# Patient Record
Sex: FEMALE | Race: OTHER | Employment: FULL TIME | URBAN - METROPOLITAN AREA
[De-identification: names, ages, dates, MRNs, and addresses within clinical notes are randomized per-mention and may not be internally consistent; named-entity substitution may affect disease eponyms.]

---

## 2017-01-04 ENCOUNTER — OFFICE VISIT (OUTPATIENT)
Dept: PHYSICAL THERAPY | Age: 52
End: 2017-01-04
Attending: INTERNAL MEDICINE

## 2017-01-04 PROCEDURE — 97110 THERAPEUTIC EXERCISES: CPT

## 2017-01-04 PROCEDURE — 97140 MANUAL THERAPY 1/> REGIONS: CPT

## 2017-01-04 NOTE — PROGRESS NOTES
Goals     • Therapy Goals       Long Term Goals (Time Frame 12 visits)   1. Pt to report reduction of low back pain with prolonged sitting >30 minutes to no more than 1-2/10 pain for improved pain management with daily skill   2.  Pt to demonstrate impro symptoms of the R anterior thigh with any lumbar testing including neural SLR and Slump tests (only local hamstring tightness).  R>L SIJ special tests demonstrate involvement including ASLR, Compression, Distraction and posterior thigh thrust.  Palpation o bands 2x10     Manual x units x 1  STM, trigger point release  For LS paraspinals R>L x2 units  1. STM  2 trigger point   3. CF  4. Anaid's  (1-5 L>R) X 1 unit   1. STM  2. Trigger points  3. Thieles Lside:1-3,5  R :5 x2 units  1. STM  2.  Trigger points  On Palpation Left Right Comments   Superficial Transverse perineal minimal restriction, trigger point, pain and tenderness moderate restriction, severe restriction, trigger point, pain and tenderness    Bulbocavernosus moderate restriction, trigger point, ole but relates that she may be lifting more than is allowed. She reports still having leaking when she coughs hard , sneezes and laugh with friends. She has have tried to do STM on PFM herself with digital mobilization but was not sure she was doing it right. 2.5/10 scale in VAS. She states that she has been doing HEP and she was having pain. She reports that she has been doing them as instructed and when she saw MD she was also instructed not to overdo HEP.  SHe relates that her pelvic pain is better and that s exercises at this time    Therapist: Moni Yan, [facility] 1:57 PM     12/27/16    Pt states that she has not been leaking at all even with laughing, no episodes of leaking this week. Pt has purchased the thera wand at and this time, no questions.  Pt s     External Rotation  4/5  4-/5      Internal Rotation  4/5  4-/5      Knee:          Flexion  5/5  5/5      Extension  5/5  4+/5      Ankle:          Dorsiflexion  5/5  5/5      Abdominals:  3/5                     Therapist: Koki Gillespie PT, CRIS 1

## 2017-01-05 ENCOUNTER — OFFICE VISIT (OUTPATIENT)
Dept: PHYSICAL THERAPY | Age: 52
End: 2017-01-05
Attending: PHYSICAL MEDICINE & REHABILITATION
Payer: OTHER MISCELLANEOUS

## 2017-01-05 PROCEDURE — 97110 THERAPEUTIC EXERCISES: CPT

## 2017-01-05 PROCEDURE — 97140 MANUAL THERAPY 1/> REGIONS: CPT

## 2017-01-05 NOTE — PROGRESS NOTES
Goals     • Therapy Goals       Long Term Goals (Time Frame 12 visits)   1. Pt to report reduction of low back pain with prolonged sitting >30 minutes to no more than 1-2/10 pain for improved pain management with daily skill   2.  Pt to demonstrate impro symptoms of the R anterior thigh with any lumbar testing including neural SLR and Slump tests (only local hamstring tightness).  R>L SIJ special tests demonstrate involvement including ASLR, Compression, Distraction and posterior thigh thrust.  Palpation o mins  14. Standing hip ex x15 RTB: flex/ext/abd     Manual x units x 1  STM, trigger point release  For LS paraspinals R>L x1 unit  1. STM  2 trigger point   LS paraspinals R>L X 1 unit   1. STM  2. Trigger points  3. Thieles Lside:1-3,5  R :5 x2 units  1.  Bria Grayding perineal    Internal Palpation Left Right Comments   Superficial Transverse perineal minimal restriction, trigger point, pain and tenderness moderate restriction, severe restriction, trigger point, pain and tenderness    Bulbocavernosus moderate restrictio when she lifts at work but relates that she may be lifting more than is allowed. She reports still having leaking when she coughs hard , sneezes and laugh with friends.  She has have tried to do STM on PFM herself with digital mobilization but was not sure with low back pain , 2.5/10 scale in VAS. She states that she has been doing HEP and she was having pain. She reports that she has been doing them as instructed and when she saw MD she was also instructed not to overdo HEP.  SHe relates that her pelvic pain week . No other exercises at this time    Therapist: Guera Edmonds, PT,[facility] 1:57 PM     12/27/16    Pt states that she has not been leaking at all even with laughing, no episodes of leaking this week.  Pt has purchased the thera wand at and this time, no Adduction  4+/5  4/5      External Rotation  4/5  4-/5      Internal Rotation  4/5  4-/5      Knee:          Flexion  5/5  5/5      Extension  5/5  4+/5      Ankle:          Dorsiflexion  5/5  5/5      Abdominals:  3/5                     Therapist: Willie Cabrera

## 2017-01-05 NOTE — PROGRESS NOTES
Patient Name: Marci Machuca, : 1965, MRN: [id number]   Date:  2017  Referring Physician:  Dr Sagar Fine    Diagnosis: Lumbar sprain S33. 5XXA,    Progress Summary    Pt has attended 10 out of 12 visits approved  in Physical Therapy. no more than 1x/month for improved pelvic floor strength   4. Pt to improve strength in all muscles tested below 5 by 1/2 MMT grade for improved squatting to  objects off floor.    5. Pt will exhibit 4/5 strength and 7 secs hold x 7 reps in PFM for c certification required: Yes  I certify the need for these services furnished under this plan of treatment and while under my care.     X___________________________________________________ Date____________________    Certification From: 3/2/2680  To:4/5/2017

## 2017-01-10 ENCOUNTER — OFFICE VISIT (OUTPATIENT)
Dept: PHYSICAL THERAPY | Age: 52
End: 2017-01-10
Attending: INTERNAL MEDICINE
Payer: OTHER MISCELLANEOUS

## 2017-01-10 PROCEDURE — 97140 MANUAL THERAPY 1/> REGIONS: CPT

## 2017-01-10 PROCEDURE — 97110 THERAPEUTIC EXERCISES: CPT

## 2017-01-10 NOTE — PROGRESS NOTES
Goals     • Therapy Goals       Long Term Goals (Time Frame 12 visits)   1. Pt to report reduction of low back pain with prolonged sitting >30 minutes to no more than 1-2/10 pain for improved pain management with daily skill   2.  Pt to demonstrate impro symptoms of the R anterior thigh with any lumbar testing including neural SLR and Slump tests (only local hamstring tightness).  R>L SIJ special tests demonstrate involvement including ASLR, Compression, Distraction and posterior thigh thrust.  Palpation o Shutttle R/L 2 bands 2x10  13. NU step level 6 x 7 mins  14. Standing hip ex x15 RTB: flex/ext/abd  15. minisquats  On RB 2x10  16. DKTC  17. seated trunk flexion     Manual x units x 1  STM, trigger point release  For LS paraspinals R>L x1 unit  1. STM  2 WNL  Perineal body: WNL  Pelvic clock: Pain on R levator ani, B bulbocavenosus , B superficial transverse perineal    Internal Palpation Left Right Comments   Superficial Transverse perineal minimal restriction, trigger point, pain and tenderness moderate the same regarding her pain. Pain level is at 3/10 on low back but states that she still has a lot of pain when she lifts at work but relates that she may be lifting more than is allowed.  She reports still having leaking when she coughs hard , sneezes and details. Therapist: Ashlee Syed 3:53 PM    12/19/16    Pt states that she is still having problems with low back pain , 2.5/10 scale in VAS. She states that she has been doing HEP and she was having pain.  She reports that she has been doing to assist with interpretation and pt did better with engaging. Pt was instructed to just do TrA exercises this whole week .  No other exercises at this time    Therapist: Stef Pittman, [facility] 1:57 PM     12/27/16    Pt states that she has not been leak Hip:  Left  Right  Comments    Flexion  4+/5  4/5      Extension  4/5  4-/5      Abduction  4/5  4-/5      Adduction  4+/5  4/5      External Rotation  4/5  4-/5      Internal Rotation  4/5  4-/5      Knee:          Flexion  5/5  5/5      Extension  5/

## 2017-01-12 ENCOUNTER — OFFICE VISIT (OUTPATIENT)
Dept: PHYSICAL THERAPY | Age: 52
End: 2017-01-12
Attending: PHYSICAL MEDICINE & REHABILITATION
Payer: OTHER MISCELLANEOUS

## 2017-01-12 PROCEDURE — 97110 THERAPEUTIC EXERCISES: CPT

## 2017-01-12 PROCEDURE — 97140 MANUAL THERAPY 1/> REGIONS: CPT

## 2017-01-12 NOTE — PROGRESS NOTES
Goals     • Therapy Goals       Long Term Goals (Time Frame 12 visits)   1. Pt to report reduction of low back pain with prolonged sitting >30 minutes to no more than 1-2/10 pain for improved pain management with daily skill   2.  Pt to demonstrate impro symptoms of the R anterior thigh with any lumbar testing including neural SLR and Slump tests (only local hamstring tightness).  R>L SIJ special tests demonstrate involvement including ASLR, Compression, Distraction and posterior thigh thrust.  Palpation o x2  11. Shuttle BLE 5bands 2x10  12. Shutttle R/L 2 bands 2x10  13. NU step level 6 x 7 mins  14. Standing hip ex x15 RTB: flex/ext/abd  15. minisquats  On RB 2x10  16. DKTC  17. seated trunk flexion    18.bosu lunges 2x10  19.  NWB lumbar rot   Manual x un pubis: WNL  Labia majora: WNL  Labia minora:  WNL  Urethral meatus: WNL  Introitus: WNL  Perineal body: WNL  Pelvic clock: Pain on R levator ani, B bulbocavenosus , B superficial transverse perineal    Internal Palpation Left Right Comments   Superficial Tr 3:46 PM    12/12/2016  Pt came to therapy this day and states that she feela about the same regarding her pain.  Pain level is at 3/10 on low back but states that she still has a lot of pain when she lifts at work but relates that she may be lifting more th continue with POC and work on further [address] [place] [address]. See progress notes for details. Therapist: Abbey Lynch 3:53 PM    12/19/16    Pt states that she is still having problems with low back pain , 2.5/10 scale in VAS.  She states that she has b to just exhale without engaging TrA. PT had somebody who speaks Divehi to assist with interpretation and pt did better with engaging. Pt was instructed to just do TrA exercises this whole week .  No other exercises at this time    Therapist: Paris Reza, paraspinal mm R>L  Pt needed verbal cues for PFM level 2 so kept the same level     Hip:  Left  Right  Comments    Flexion  4+/5  4/5      Extension  4/5  4-/5      Abduction  4/5  4-/5      Adduction  4+/5  4/5      External Rotation  4/5  4-/5      Inter exercises and initiated dynamic lunges this day. Reviewed HEP.     Therapist: Diomedes Luis PT, CRIS

## 2017-01-16 ENCOUNTER — OFFICE VISIT (OUTPATIENT)
Dept: PHYSICAL THERAPY | Age: 52
End: 2017-01-16
Attending: PHYSICAL MEDICINE & REHABILITATION
Payer: OTHER MISCELLANEOUS

## 2017-01-16 PROCEDURE — 97110 THERAPEUTIC EXERCISES: CPT

## 2017-01-16 NOTE — PROGRESS NOTES
Goals     • Therapy Goals       Long Term Goals (Time Frame 12 visits)   1. Pt to report reduction of low back pain with prolonged sitting >30 minutes to no more than 1-2/10 pain for improved pain management with daily skill   2.  Pt to demonstrate impro symptoms of the R anterior thigh with any lumbar testing including neural SLR and Slump tests (only local hamstring tightness).  R>L SIJ special tests demonstrate involvement including ASLR, Compression, Distraction and posterior thigh thrust.  Palpation o abd  10. Floor to waist lift6# x2  11. Shuttle BLE 5bands 2x10  12. Shutttle R/L 2 bands 2x10  13. NU step level 6 x 7 mins  14. Standing hip ex x15 RTB: flex/ext/abd  15. minisquats  On RB 2x10  16.  DKTC  17. seated trunk flexion    18.bosu lunges 2x10  1 day:  PELVIC EXAMINATION  Verbal consent given internal examination: YES    External Observation  Mons pubis: WNL  Labia majora: WNL  Labia minora:  WNL  Urethral meatus: WNL  Introitus: WNL  Perineal body: WNL  Pelvic clock: Pain on R levator ani, B bulboc displacement felt with palpation, no visible displacement    Therapist: Kya William, PT, CRIS  12/8/2016 3:46 PM    12/12/2016  Pt came to therapy this day and states that she feela about the same regarding her pain.  Pain level is at 3/10 on low back decreased trigger point feel, further decreased wit treatment. Advanced  HEP ,kept PFM ex the same and . WIll continue with POC and work on further strethening. See progress notes for details.     Therapist: Alma Wilde 3:53 PM    12/19/16    P in supine. Pt had a difficult time and had to be cued in standing but still not able to engage as well , seems to just exhale without engaging TrA. PT had somebody who speaks Botswanan to assist with interpretation and pt did better with engaging.  Pt was ins R.L  Poor techniques with floor to wasit technique with 6# trial  Still with moderate hypertonicty of paraspinal mm R>L  Pt needed verbal cues for PFM level 2 so kept the same level     Hip:  Left  Right  Comments    Flexion  4+/5  4/5      Extension  4/5  time. STM continued as pt continues to have pain relief. Advanced to green band at home for hip exercises and initiated dynamic lunges this day. Reviewed HEP.     Therapist: Abril Elliott, PT, [facility]    1/16/17    Pt states that she feels better this day a

## 2017-01-18 ENCOUNTER — APPOINTMENT (OUTPATIENT)
Dept: PHYSICAL THERAPY | Age: 52
End: 2017-01-18
Attending: INTERNAL MEDICINE
Payer: OTHER MISCELLANEOUS

## 2017-01-19 ENCOUNTER — OFFICE VISIT (OUTPATIENT)
Dept: PHYSICAL THERAPY | Age: 52
End: 2017-01-19
Attending: PHYSICAL MEDICINE & REHABILITATION
Payer: OTHER MISCELLANEOUS

## 2017-01-19 PROCEDURE — 97014 ELECTRIC STIMULATION THERAPY: CPT

## 2017-01-19 PROCEDURE — 97110 THERAPEUTIC EXERCISES: CPT

## 2017-01-19 NOTE — PROGRESS NOTES
Dx: Lumbar sprain S33. 5XXA, Pelvic sprain S33. Kya Krause                           Next MD visit: 1/31/17  Fall Risk: standard         Precautions: n/a           DATE OF SERVICE: 01/19/17  Medications: Yes/no: no  Subjective: Pt states that at times she could pa

## 2017-01-24 ENCOUNTER — OFFICE VISIT (OUTPATIENT)
Dept: PHYSICAL THERAPY | Age: 52
End: 2017-01-24
Attending: INTERNAL MEDICINE
Payer: OTHER MISCELLANEOUS

## 2017-01-24 PROCEDURE — 97110 THERAPEUTIC EXERCISES: CPT

## 2017-01-24 PROCEDURE — 97140 MANUAL THERAPY 1/> REGIONS: CPT

## 2017-01-24 NOTE — PROGRESS NOTES
Dx: Lumbar sprain S33. 5XXA, Pelvic sprain S33. Margy Margarito                           Next MD visit: 1/31/17  Fall Risk: standard         Precautions: n/a           DATE OF SERVICE: 01/19/17  Medications: Yes/no: no  Subjective: Pt states that he feels the more ole frequency of HEP to once a day and work on supine exercises only and see this will decrease pain. STM performed again and pt felt better after session from 2/1 to .5/10, no IFC as it did not help with residual pain.  Will reassess next session  Plan: contin

## 2017-01-26 ENCOUNTER — OFFICE VISIT (OUTPATIENT)
Dept: PHYSICAL THERAPY | Age: 52
End: 2017-01-26
Attending: PHYSICAL MEDICINE & REHABILITATION
Payer: OTHER MISCELLANEOUS

## 2017-01-26 PROCEDURE — 97140 MANUAL THERAPY 1/> REGIONS: CPT

## 2017-01-26 PROCEDURE — 97530 THERAPEUTIC ACTIVITIES: CPT

## 2017-01-26 NOTE — PROGRESS NOTES
Dx: Lumbar sprain S33. 5XXA, Pelvic sprain S33. Evy Prude                           Next MD visit: 1/31/17  Fall Risk: standard         Precautions: n/a           DATE OF SERVICE: 01/26/17  Medications: Yes/no: no  Subjective: Pt states that she feels better with Abdominals:   3+/5                                   Education done/HEP given: discuss  Doing HEP and to leave off standing ex, attempted to do 2# PRE's but with evident difficulty and verbalized difficulty so kept at 1#.  She was seen for IASTM  This day

## 2017-01-30 ENCOUNTER — APPOINTMENT (OUTPATIENT)
Dept: PHYSICAL THERAPY | Age: 52
End: 2017-01-30
Attending: INTERNAL MEDICINE
Payer: OTHER MISCELLANEOUS

## 2017-01-30 PROCEDURE — 97110 THERAPEUTIC EXERCISES: CPT

## 2017-01-30 NOTE — PROGRESS NOTES
Patient Name: Wanda Rodarte,    : 1965,     MRN: [id number]   Date:  2017  Referring Physician:  Dr Yara Clement    Diagnosis: Lumbar sprain S33. 5XXA,    Progress Summary    Pt has attended 16 visits  in Physical Therapy.      Progres and 7 secs hold x 7 reps in PFM for continence maintenance with lifting and coughing: met, no leakage             Objective:     Lumbar motions:WFL and painfree  Minimal hypertonicty , no pain upon palpation except tenderness   Floor to waist technique wit Date____________________    Certification From: 6/6/4583  To:4/5/2017

## 2017-01-30 NOTE — PROGRESS NOTES
Dx: Lumbar sprain S33. 5XXA, Pelvic sprain S33. Heath Sanchez                           Next MD visit: 1/31/17  Fall Risk: standard         Precautions: n/a           DATE OF SERVICE: 01/30/17  Medications: Yes/no: no  Subjective: Pt states that she feels better with      Adduction   4+/5   4+/5        External Rotation   4+/5   4+/5        Internal Rotation   5/5   5/5        Knee:              Flexion   5/5   5/5        Extension   5/5   5/5        Ankle:              Dorsiflexion   5/5   5/5        Abdominals:   3+/

## 2017-02-02 ENCOUNTER — APPOINTMENT (OUTPATIENT)
Dept: PHYSICAL THERAPY | Age: 52
End: 2017-02-02
Attending: PHYSICAL MEDICINE & REHABILITATION
Payer: OTHER MISCELLANEOUS

## 2017-02-07 ENCOUNTER — OFFICE VISIT (OUTPATIENT)
Dept: PHYSICAL THERAPY | Age: 52
End: 2017-02-07
Attending: PHYSICAL MEDICINE & REHABILITATION
Payer: OTHER MISCELLANEOUS

## 2017-02-07 PROCEDURE — 97110 THERAPEUTIC EXERCISES: CPT

## 2017-02-07 NOTE — PROGRESS NOTES
Dx: Lumbar sprain S33. 5XXA, Pelvic sprain S33. Sorto Yadiel                           Next MD visit: 2/27/17  Fall Risk: standard         Precautions: n/a           DATE OF SERVICE: 01/30/17  Medications: Yes/no:higher dosage at 60 mg  Subjective: Pt states that sh paraspinals and piriformis per tolerance  Step up 6\" 2x10     Assessment:   Hip:   Left   Right   Comments     Flexion   5/5   5/5        Extension   4+/5   4+/5        Abduction   5/5   5/5        Adduction   4+/5   4+/5        External Rotation   4+/5

## 2017-02-09 ENCOUNTER — APPOINTMENT (OUTPATIENT)
Dept: PHYSICAL THERAPY | Age: 52
End: 2017-02-09
Attending: PHYSICAL MEDICINE & REHABILITATION
Payer: OTHER MISCELLANEOUS

## 2017-02-14 ENCOUNTER — OFFICE VISIT (OUTPATIENT)
Dept: PHYSICAL THERAPY | Age: 52
End: 2017-02-14
Attending: PHYSICAL MEDICINE & REHABILITATION
Payer: OTHER MISCELLANEOUS

## 2017-02-14 PROCEDURE — 97110 THERAPEUTIC EXERCISES: CPT

## 2017-02-14 NOTE — PROGRESS NOTES
Dx: Lumbar sprain S33. 5XXA, Pelvic sprain S33. Delvis Xiao                           Next MD visit: 2/23/17  Fall Risk: standard         Precautions: n/a           DATE OF SERVICE: 2/14/17  Medications: Yes/no:higher dosage at 60 mg  Subjective: Pt states that she and piriformis IASTM on B paraspinals and piriformis per tolerance  Step up 6\" 2x10     Assessment:   Hip:   Left   Right   Comments     Flexion   5/5   5/5        Extension   4+/5   4+/5        Abduction   5/5   5/5        Adduction   4+/5   4+/5

## 2017-02-21 ENCOUNTER — OFFICE VISIT (OUTPATIENT)
Dept: PHYSICAL THERAPY | Age: 52
End: 2017-02-21
Attending: PHYSICAL MEDICINE & REHABILITATION
Payer: OTHER MISCELLANEOUS

## 2017-02-21 PROCEDURE — 97110 THERAPEUTIC EXERCISES: CPT

## 2017-02-21 NOTE — PROGRESS NOTES
Patient Name: Mckenzie Nice,    : 1965,     MRN: [id number]   Date:  2017  Referring Physician:  Dr Jaden Starr    Diagnosis: Lumbar sprain S33. 5XXA,    Discharge  Summary    Pt has attended 19 visits  in Physical Therapy.      Progress Left    Right    Comments      Flexion    5/5    5/5          Extension    4+/5    4+/5          Abduction    5/5    5/5          Adduction    4+/5    4+/5          External Rotation    4+/5    4+/5          Internal Rotation    5/5    5/5          Knee:

## 2017-02-21 NOTE — PROGRESS NOTES
Dx: Lumbar sprain S33. 5XXA, Pelvic sprain S33. Margy Margarito                           Next MD visit: 2/23/17  Fall Risk: standard         Precautions: n/a           DATE OF SERVICE: 2/21/17  Medications: Yes/no:NO  Subjective: Pt states that she feels better by 80% hip extension 1x10 x1# Prone hip extension 1x10 x1# Prone hip extension 1x10 x2#     IASTM on B paraspinals and piriformis per tolerance  Step up 6\" 2x10     Assessment:   Hip:   Left   Right   Comments     Flexion   5/5   5/5        Extension   4+/5   4+

## 2017-05-30 ENCOUNTER — LAB ENCOUNTER (OUTPATIENT)
Dept: LAB | Facility: HOSPITAL | Age: 52
End: 2017-05-30
Attending: INTERNAL MEDICINE
Payer: COMMERCIAL

## 2017-05-30 ENCOUNTER — OFFICE VISIT (OUTPATIENT)
Dept: INTERNAL MEDICINE CLINIC | Facility: CLINIC | Age: 52
End: 2017-05-30

## 2017-05-30 VITALS
HEIGHT: 62 IN | BODY MASS INDEX: 27.23 KG/M2 | TEMPERATURE: 99 F | SYSTOLIC BLOOD PRESSURE: 114 MMHG | WEIGHT: 148 LBS | HEART RATE: 67 BPM | DIASTOLIC BLOOD PRESSURE: 73 MMHG

## 2017-05-30 DIAGNOSIS — H53.8 BLURRED VISION, BILATERAL: ICD-10-CM

## 2017-05-30 DIAGNOSIS — M32.9 SLE (SYSTEMIC LUPUS ERYTHEMATOSUS) (HCC): ICD-10-CM

## 2017-05-30 DIAGNOSIS — L98.9 FACIAL SKIN LESION: ICD-10-CM

## 2017-05-30 DIAGNOSIS — Z00.00 ANNUAL PHYSICAL EXAM: Primary | ICD-10-CM

## 2017-05-30 DIAGNOSIS — M06.9 RHEUMATOID ARTHRITIS, INVOLVING UNSPECIFIED SITE, UNSPECIFIED RHEUMATOID FACTOR PRESENCE: ICD-10-CM

## 2017-05-30 PROCEDURE — 99396 PREV VISIT EST AGE 40-64: CPT | Performed by: INTERNAL MEDICINE

## 2017-05-30 RX ORDER — DULOXETIN HYDROCHLORIDE 60 MG/1
60 CAPSULE, DELAYED RELEASE ORAL DAILY
COMMUNITY
End: 2018-03-12 | Stop reason: DRUGHIGH

## 2017-05-30 NOTE — PROGRESS NOTES
HPI:    Patient ID: Alvaro Valenzuela is a 49year old female. HPI     Annual Preventative Exam  Pt arrives today for annual preventative physical examination. The pt c/o blurry vision. She has 0/10 pain. Denies blood in stool, melena, CP or SOB.  Pt reques Take 1 tablet (40 mg total) by mouth every morning before breakfast. Disp: 30 tablet Rfl: 6   Diclofenac Sodium 1 % Transdermal Gel Apply 4 g topically 4 (four) times daily.  Disp: 1 Tube Rfl: 3   ibuprofen 600 MG Oral Tab Take 1 tablet (600 mg total) by mo note and vitals reviewed. 05/30/17  0938   BP: 114/73   Pulse: 67   Temp: 98.5 °F (36.9 °C)   TempSrc: Oral   Height: 5' 2\" (1.575 m)   Weight: 148 lb (67.132 kg)         Body mass index is 27.06 kg/(m^2).            ASSESSMENT/PLAN:   (Z00.00) Annua

## 2017-05-31 ENCOUNTER — TELEPHONE (OUTPATIENT)
Dept: INTERNAL MEDICINE CLINIC | Facility: CLINIC | Age: 52
End: 2017-05-31

## 2017-05-31 ENCOUNTER — APPOINTMENT (OUTPATIENT)
Dept: LAB | Facility: HOSPITAL | Age: 52
End: 2017-05-31
Attending: INTERNAL MEDICINE
Payer: COMMERCIAL

## 2017-05-31 DIAGNOSIS — Z00.00 ANNUAL PHYSICAL EXAM: ICD-10-CM

## 2017-05-31 PROCEDURE — 80061 LIPID PANEL: CPT

## 2017-05-31 PROCEDURE — 86225 DNA ANTIBODY NATIVE: CPT

## 2017-05-31 PROCEDURE — 81001 URINALYSIS AUTO W/SCOPE: CPT

## 2017-05-31 PROCEDURE — 80053 COMPREHEN METABOLIC PANEL: CPT

## 2017-05-31 PROCEDURE — 86140 C-REACTIVE PROTEIN: CPT

## 2017-05-31 PROCEDURE — 84443 ASSAY THYROID STIM HORMONE: CPT

## 2017-05-31 PROCEDURE — 85025 COMPLETE CBC W/AUTO DIFF WBC: CPT

## 2017-05-31 PROCEDURE — 85652 RBC SED RATE AUTOMATED: CPT

## 2017-05-31 PROCEDURE — 36415 COLL VENOUS BLD VENIPUNCTURE: CPT

## 2017-05-31 PROCEDURE — 82306 VITAMIN D 25 HYDROXY: CPT

## 2017-06-07 ENCOUNTER — OFFICE VISIT (OUTPATIENT)
Dept: OBGYN CLINIC | Facility: CLINIC | Age: 52
End: 2017-06-07

## 2017-06-07 ENCOUNTER — OFFICE VISIT (OUTPATIENT)
Dept: RHEUMATOLOGY | Facility: CLINIC | Age: 52
End: 2017-06-07

## 2017-06-07 VITALS
WEIGHT: 146 LBS | SYSTOLIC BLOOD PRESSURE: 111 MMHG | BODY MASS INDEX: 27 KG/M2 | HEART RATE: 62 BPM | DIASTOLIC BLOOD PRESSURE: 76 MMHG

## 2017-06-07 VITALS
BODY MASS INDEX: 27.05 KG/M2 | HEIGHT: 62 IN | WEIGHT: 147 LBS | SYSTOLIC BLOOD PRESSURE: 109 MMHG | DIASTOLIC BLOOD PRESSURE: 73 MMHG | TEMPERATURE: 98 F | HEART RATE: 69 BPM

## 2017-06-07 DIAGNOSIS — D72.819 LEUKOPENIA, UNSPECIFIED TYPE: ICD-10-CM

## 2017-06-07 DIAGNOSIS — Z01.419 ENCOUNTER FOR GYNECOLOGICAL EXAMINATION WITHOUT ABNORMAL FINDING: ICD-10-CM

## 2017-06-07 DIAGNOSIS — M32.9 SYSTEMIC LUPUS ERYTHEMATOSUS, UNSPECIFIED SLE TYPE, UNSPECIFIED ORGAN INVOLVEMENT STATUS (HCC): Primary | ICD-10-CM

## 2017-06-07 DIAGNOSIS — G89.29 CHRONIC PAIN OF BOTH KNEES: ICD-10-CM

## 2017-06-07 DIAGNOSIS — Z01.419 WOMEN'S ANNUAL ROUTINE GYNECOLOGICAL EXAMINATION: Primary | ICD-10-CM

## 2017-06-07 DIAGNOSIS — Z12.31 SCREENING MAMMOGRAM, ENCOUNTER FOR: ICD-10-CM

## 2017-06-07 DIAGNOSIS — M25.562 CHRONIC PAIN OF BOTH KNEES: ICD-10-CM

## 2017-06-07 DIAGNOSIS — Z51.81 THERAPEUTIC DRUG MONITORING: ICD-10-CM

## 2017-06-07 DIAGNOSIS — M25.561 CHRONIC PAIN OF BOTH KNEES: ICD-10-CM

## 2017-06-07 PROCEDURE — 99214 OFFICE O/P EST MOD 30 MIN: CPT | Performed by: INTERNAL MEDICINE

## 2017-06-07 PROCEDURE — 99212 OFFICE O/P EST SF 10 MIN: CPT | Performed by: INTERNAL MEDICINE

## 2017-06-07 PROCEDURE — 99396 PREV VISIT EST AGE 40-64: CPT | Performed by: OBSTETRICS & GYNECOLOGY

## 2017-06-07 RX ORDER — MELATONIN
1000 2 TIMES DAILY
COMMUNITY

## 2017-06-07 RX ORDER — PREDNISONE 1 MG/1
5 TABLET ORAL DAILY
Qty: 30 TABLET | Refills: 0 | Status: SHIPPED | OUTPATIENT
Start: 2017-06-07 | End: 2018-02-27 | Stop reason: ALTCHOICE

## 2017-06-07 RX ORDER — HYDROXYCHLOROQUINE SULFATE 200 MG/1
200 TABLET, FILM COATED ORAL 2 TIMES DAILY
Qty: 180 TABLET | Refills: 3 | Status: SHIPPED | OUTPATIENT
Start: 2017-06-07 | End: 2018-02-27

## 2017-06-07 RX ORDER — PANTOPRAZOLE SODIUM 40 MG/1
40 TABLET, DELAYED RELEASE ORAL
Qty: 30 TABLET | Refills: 3 | Status: SHIPPED | OUTPATIENT
Start: 2017-06-07 | End: 2018-02-27

## 2017-06-07 NOTE — PROGRESS NOTES
Soledad Tobias is a 49year old female. HPI:   Patient presents with:  SLE  Knee Pain: swelling      I had the pleasure of seeing Mrs. Soledad Tobias on 8/22/2016. I had last seen her on Oct. 5, 2015. I had last seen her on March 25, 2015.  I had last se is better. No longer taking meloxicam or flexeril for her back. Her wbc is 3.0 -no infections, no fevers. She still getting midl headaches. Her eyes are tired. She lost 3 pounds since last year. She is not on ibpurofen.        HISTORY:  Past Medical Clear oropharynx, no oral ulcers, EOM intact, clear sclear, PERRLA, pleasant, no acute distress, no CAD, no neck tendnerness, good ROM,   No rashes  CVS: RRR, no murmurs  RS: CTAB, no crackles, no rhonchi, no chest wall tenderness  ABD: Soft Non tender, no Eosinophils Absolute      0.0-0.7 K/UL 0.1   Basophils Absolute      0.0-0.2 K/UL 0.0   URINE-COLOR      Yellow Yellow   CLARITY URINE      Clear Clear   SPECIFIC GRAVITY      1.002-1.035 1.018   PH, URINE      5.0-8.0 6.0   PROTEIN (URINE DIPSTICK) if pain increases, she doesn't want anything extra at this time. Labs look good. - headache is 2 days a week - bulbital given - hx of migraines - it helps her -   = echo 6/4/10`6 -   1. Left ventricle:  The cavity size was normal. Systolic function was no

## 2017-06-07 NOTE — PROGRESS NOTES
HPI:    Patient ID: Soledad Tobias is a 49year old female. HPI  Patient here for routine exam.  No C/Os. Needs mammogram order. S/P HALIMA-BSO five years ago. Review of Systems   Constitutional: Negative. Respiratory: Negative.     Cardiovascular discharge. No adenopathy. Abdominal: Soft. Bowel sounds are normal. She exhibits no mass. There is no tenderness. Genitourinary: Vagina normal. No vaginal discharge found. Musculoskeletal: Normal range of motion.    Lymphadenopathy:     She has no ce

## 2017-06-07 NOTE — PATIENT INSTRUCTIONS
1. Prednisone  5mg a day x 2 weeks. - keep extra to see if you need it   2. Cont. hydroxychlrqouine 200mg twice a day   3. Tylenol arthirits 1-2 tablets a day -   4. Return to [facility] in 4-6 months. 5. Check cbc in 1 month.    6. PT for lilian velázquezis

## 2017-06-12 ENCOUNTER — OFFICE VISIT (OUTPATIENT)
Dept: PHYSICAL THERAPY | Facility: HOSPITAL | Age: 52
End: 2017-06-12
Attending: INTERNAL MEDICINE
Payer: COMMERCIAL

## 2017-06-12 ENCOUNTER — HOSPITAL ENCOUNTER (OUTPATIENT)
Dept: MAMMOGRAPHY | Age: 52
Discharge: HOME | End: 2017-06-12
Attending: OBSTETRICS & GYNECOLOGY
Payer: COMMERCIAL

## 2017-06-12 DIAGNOSIS — Z12.31 SCREENING MAMMOGRAM, ENCOUNTER FOR: ICD-10-CM

## 2017-06-12 DIAGNOSIS — M25.562 CHRONIC PAIN OF BOTH KNEES: Primary | ICD-10-CM

## 2017-06-12 DIAGNOSIS — G89.29 CHRONIC PAIN OF BOTH KNEES: Primary | ICD-10-CM

## 2017-06-12 DIAGNOSIS — M25.561 CHRONIC PAIN OF BOTH KNEES: Primary | ICD-10-CM

## 2017-06-12 PROCEDURE — 77067 SCR MAMMO BI INCL CAD: CPT | Performed by: OBSTETRICS & GYNECOLOGY

## 2017-06-12 PROCEDURE — 97110 THERAPEUTIC EXERCISES: CPT

## 2017-06-12 PROCEDURE — 97162 PT EVAL MOD COMPLEX 30 MIN: CPT

## 2017-06-13 ENCOUNTER — OFFICE VISIT (OUTPATIENT)
Dept: OTOLARYNGOLOGY | Facility: CLINIC | Age: 52
End: 2017-06-13

## 2017-06-13 VITALS
WEIGHT: 158 LBS | TEMPERATURE: 99 F | SYSTOLIC BLOOD PRESSURE: 110 MMHG | HEIGHT: 62 IN | BODY MASS INDEX: 29.08 KG/M2 | DIASTOLIC BLOOD PRESSURE: 66 MMHG

## 2017-06-13 DIAGNOSIS — L98.9 EXTERNAL NASAL LESION: Primary | ICD-10-CM

## 2017-06-13 PROCEDURE — 99212 OFFICE O/P EST SF 10 MIN: CPT | Performed by: OTOLARYNGOLOGY

## 2017-06-13 PROCEDURE — 99243 OFF/OP CNSLTJ NEW/EST LOW 30: CPT | Performed by: OTOLARYNGOLOGY

## 2017-06-13 PROCEDURE — 11441 EXC FACE-MM B9+MARG 0.6-1 CM: CPT | Performed by: OTOLARYNGOLOGY

## 2017-06-13 PROCEDURE — 88305 TISSUE EXAM BY PATHOLOGIST: CPT | Performed by: OTOLARYNGOLOGY

## 2017-06-16 ENCOUNTER — OFFICE VISIT (OUTPATIENT)
Dept: PHYSICAL THERAPY | Facility: HOSPITAL | Age: 52
End: 2017-06-16
Attending: INTERNAL MEDICINE
Payer: COMMERCIAL

## 2017-06-16 DIAGNOSIS — M25.561 CHRONIC PAIN OF BOTH KNEES: Primary | ICD-10-CM

## 2017-06-16 DIAGNOSIS — G89.29 CHRONIC PAIN OF BOTH KNEES: Primary | ICD-10-CM

## 2017-06-16 DIAGNOSIS — M25.562 CHRONIC PAIN OF BOTH KNEES: Primary | ICD-10-CM

## 2017-06-16 PROCEDURE — 97110 THERAPEUTIC EXERCISES: CPT

## 2017-06-16 NOTE — PROGRESS NOTES
Diagnosis: Chronic pain of both knees (M25.561,M25.562,G89.29)  Authorized # of Visits:  2        Insurance:BCBS PPO   Next MD visit: Not scheduled  Fall Risk: standard         Precautions: n/a           Medication Changes since last visit?: No  Subjective

## 2017-06-21 ENCOUNTER — OFFICE VISIT (OUTPATIENT)
Dept: PHYSICAL THERAPY | Facility: HOSPITAL | Age: 52
End: 2017-06-21
Attending: INTERNAL MEDICINE
Payer: COMMERCIAL

## 2017-06-21 DIAGNOSIS — M25.561 CHRONIC PAIN OF BOTH KNEES: Primary | ICD-10-CM

## 2017-06-21 DIAGNOSIS — M25.562 CHRONIC PAIN OF BOTH KNEES: Primary | ICD-10-CM

## 2017-06-21 DIAGNOSIS — G89.29 CHRONIC PAIN OF BOTH KNEES: Primary | ICD-10-CM

## 2017-06-21 PROCEDURE — 97110 THERAPEUTIC EXERCISES: CPT

## 2017-06-21 NOTE — PROGRESS NOTES
Diagnosis: Chronic pain of both knees (M25.561,M25.562,G89.29)  Authorized # of Visits:  3        Insurance:BCBS PPO   Next MD visit: Not scheduled  Fall Risk: standard         Precautions: n/a           Medication Changes since last visit?: No  Subjective

## 2017-06-28 ENCOUNTER — LAB ENCOUNTER (OUTPATIENT)
Dept: LAB | Facility: HOSPITAL | Age: 52
End: 2017-06-28
Attending: INTERNAL MEDICINE
Payer: COMMERCIAL

## 2017-06-28 DIAGNOSIS — M32.9 SLE (SYSTEMIC LUPUS ERYTHEMATOSUS) (HCC): ICD-10-CM

## 2017-06-28 PROCEDURE — 82565 ASSAY OF CREATININE: CPT

## 2017-06-28 PROCEDURE — 36415 COLL VENOUS BLD VENIPUNCTURE: CPT

## 2017-06-28 PROCEDURE — 86140 C-REACTIVE PROTEIN: CPT

## 2017-06-28 PROCEDURE — 85652 RBC SED RATE AUTOMATED: CPT

## 2017-06-28 PROCEDURE — 84450 TRANSFERASE (AST) (SGOT): CPT

## 2017-06-28 PROCEDURE — 85025 COMPLETE CBC W/AUTO DIFF WBC: CPT

## 2017-06-28 PROCEDURE — 86225 DNA ANTIBODY NATIVE: CPT

## 2017-06-28 PROCEDURE — 84460 ALANINE AMINO (ALT) (SGPT): CPT

## 2017-06-28 PROCEDURE — 81001 URINALYSIS AUTO W/SCOPE: CPT

## 2017-07-05 ENCOUNTER — OFFICE VISIT (OUTPATIENT)
Dept: PHYSICAL THERAPY | Facility: HOSPITAL | Age: 52
End: 2017-07-05
Attending: INTERNAL MEDICINE
Payer: COMMERCIAL

## 2017-07-05 DIAGNOSIS — M25.561 CHRONIC PAIN OF BOTH KNEES: ICD-10-CM

## 2017-07-05 DIAGNOSIS — G89.29 CHRONIC PAIN OF BOTH KNEES: ICD-10-CM

## 2017-07-05 DIAGNOSIS — M25.562 CHRONIC PAIN OF BOTH KNEES: ICD-10-CM

## 2017-07-05 PROCEDURE — 97110 THERAPEUTIC EXERCISES: CPT

## 2017-07-05 NOTE — PROGRESS NOTES
Diagnosis: Chronic pain of both knees (M25.561,M25.562,G89.29)  Authorized # of Visits:  4        Insurance:BCBS PPO   Next MD visit: Not scheduled  Fall Risk: standard         Precautions: n/a           Medication Changes since last visit?: No  Subjective

## 2017-07-11 ENCOUNTER — OFFICE VISIT (OUTPATIENT)
Dept: PHYSICAL THERAPY | Age: 52
End: 2017-07-11
Attending: INTERNAL MEDICINE
Payer: COMMERCIAL

## 2017-07-11 PROCEDURE — 97110 THERAPEUTIC EXERCISES: CPT

## 2017-07-11 NOTE — PROGRESS NOTES
Diagnosis: Chronic pain of both knees (M25.561,M25.562,G89.29)  Authorized # of Visits:  5        Insurance:BCBS PPO   Next MD visit: Not scheduled  Fall Risk: standard         Precautions: n/a           Medication Changes since last visit?: No  Subjective

## 2017-07-12 ENCOUNTER — APPOINTMENT (OUTPATIENT)
Dept: PHYSICAL THERAPY | Facility: HOSPITAL | Age: 52
End: 2017-07-12
Attending: INTERNAL MEDICINE
Payer: COMMERCIAL

## 2017-07-19 ENCOUNTER — OFFICE VISIT (OUTPATIENT)
Dept: PHYSICAL THERAPY | Facility: HOSPITAL | Age: 52
End: 2017-07-19
Attending: INTERNAL MEDICINE
Payer: COMMERCIAL

## 2017-07-19 DIAGNOSIS — G89.29 CHRONIC PAIN OF BOTH KNEES: ICD-10-CM

## 2017-07-19 DIAGNOSIS — M25.561 CHRONIC PAIN OF BOTH KNEES: ICD-10-CM

## 2017-07-19 DIAGNOSIS — M25.562 CHRONIC PAIN OF BOTH KNEES: ICD-10-CM

## 2017-07-19 PROCEDURE — 97110 THERAPEUTIC EXERCISES: CPT

## 2017-07-19 NOTE — PROGRESS NOTES
Diagnosis: Chronic pain of both knees (M25.561,M25.562,G89.29)  Authorized # of Visits:  6        Insurance:BCBS PPO   Next MD visit: Not scheduled  Fall Risk: standard         Precautions: n/a           Medication Changes since last visit?: No  Subjective

## 2017-07-25 ENCOUNTER — OFFICE VISIT (OUTPATIENT)
Dept: PHYSICAL THERAPY | Age: 52
End: 2017-07-25
Attending: INTERNAL MEDICINE
Payer: COMMERCIAL

## 2017-07-25 DIAGNOSIS — G89.29 CHRONIC PAIN OF BOTH KNEES: ICD-10-CM

## 2017-07-25 DIAGNOSIS — M25.561 CHRONIC PAIN OF BOTH KNEES: ICD-10-CM

## 2017-07-25 DIAGNOSIS — M25.562 CHRONIC PAIN OF BOTH KNEES: ICD-10-CM

## 2017-07-25 PROCEDURE — 97110 THERAPEUTIC EXERCISES: CPT

## 2017-07-25 NOTE — PROGRESS NOTES
Diagnosis: Chronic pain of both knees (M25.561,M25.562,G89.29)  Authorized # of Visits:  7        Insurance:BCBS PPO   Next MD visit: as needed  Fall Risk: standard         Precautions: n/a           Medication Changes since last visit?: No  Subjective: Pt

## 2017-07-26 ENCOUNTER — APPOINTMENT (OUTPATIENT)
Dept: PHYSICAL THERAPY | Facility: HOSPITAL | Age: 52
End: 2017-07-26
Attending: INTERNAL MEDICINE
Payer: COMMERCIAL

## 2017-08-02 ENCOUNTER — OFFICE VISIT (OUTPATIENT)
Dept: PHYSICAL THERAPY | Facility: HOSPITAL | Age: 52
End: 2017-08-02
Attending: INTERNAL MEDICINE
Payer: COMMERCIAL

## 2017-08-02 DIAGNOSIS — M25.562 CHRONIC PAIN OF BOTH KNEES: ICD-10-CM

## 2017-08-02 DIAGNOSIS — M25.561 CHRONIC PAIN OF BOTH KNEES: ICD-10-CM

## 2017-08-02 DIAGNOSIS — G89.29 CHRONIC PAIN OF BOTH KNEES: ICD-10-CM

## 2017-08-02 PROCEDURE — 97110 THERAPEUTIC EXERCISES: CPT

## 2017-08-02 NOTE — PROGRESS NOTES
Diagnosis: Chronic pain of both knees (M25.561,M25.562,G89.29)  Authorized # of Visits:  8        Insurance:[facility] PPO   Next MD visit: Sept 2017  Fall Risk: standard         Precautions: n/a           Medication Changes since last visit?: No     Subjective: proper mechanics for the stairs as well as squatting. Progression of functional therapeutic exercises. The patient is to continue with her progressive HEP and f/u in September 2017.         Plan: progress strength, stability, balance, and functional mobilit

## 2017-10-11 ENCOUNTER — OFFICE VISIT (OUTPATIENT)
Dept: OPHTHALMOLOGY | Facility: CLINIC | Age: 52
End: 2017-10-11

## 2017-10-11 DIAGNOSIS — H04.123 CHRONIC DRYNESS OF BOTH EYES: ICD-10-CM

## 2017-10-11 DIAGNOSIS — H25.13 AGE-RELATED NUCLEAR CATARACT OF BOTH EYES: ICD-10-CM

## 2017-10-11 DIAGNOSIS — Z79.899 LONG-TERM USE OF PLAQUENIL: Primary | ICD-10-CM

## 2017-10-11 PROCEDURE — 99212 OFFICE O/P EST SF 10 MIN: CPT | Performed by: OPHTHALMOLOGY

## 2017-10-11 PROCEDURE — 99243 OFF/OP CNSLTJ NEW/EST LOW 30: CPT | Performed by: OPHTHALMOLOGY

## 2017-10-11 NOTE — PROGRESS NOTES
Asad Almonte is a 49year old female. HPI:     HPI     Here for a Plaquenil EE. Pt was Dx with Lupus in 2008; pt is taking 200mg twice a day. Pt denies any blurred vision at distance or near and is happy with her glasses.  Pt has been getting occasiona Pantoprazole Sodium 40 MG Oral Tab EC Take 1 tablet (40 mg total) by mouth every morning before breakfast. Disp: 30 tablet Rfl: 3   Hydroxychloroquine Sulfate 200 MG Oral Tab Take 1 tablet (200 mg total) by mouth 2 (two) times daily.  Disp: 180 tablet Rfl Cornea Clear Clear    Anterior Chamber Deep and quiet Deep and quiet    Iris Normal Normal    Lens Trace Nuclear sclerosis Trace Nuclear sclerosis    Vitreous Clear Clear          Fundus Exam       Right Left    Disc Good rim, Temporal crescent Good rim, T

## 2017-10-11 NOTE — ASSESSMENT & PLAN NOTE
No evidence of plaquenil toxicity in either eye. Will follow in 1 year for a plaquenil eye exam based on current guidelines.    Patient is approved to continue on plaquenil as directed by their PCP or rheumatologist.

## 2017-10-11 NOTE — ASSESSMENT & PLAN NOTE
Diagnosis discussed with patient. Use artificial tears (any over the counter brand is okay) up to 4 times per day as needed for dry eye symptoms. Use over the counter Alaway or Zaditor twice daily as needed for itching/ allergy symptoms. Info given.

## 2017-10-11 NOTE — PATIENT INSTRUCTIONS
Long-term use of Plaquenil   No evidence of plaquenil toxicity in either eye. Will follow in 1 year for a plaquenil eye exam based on current guidelines.    Patient is approved to continue on plaquenil as directed by their PCP or rheumatologist.        Age

## 2018-02-27 ENCOUNTER — TELEPHONE (OUTPATIENT)
Dept: RHEUMATOLOGY | Facility: CLINIC | Age: 53
End: 2018-02-27

## 2018-02-27 ENCOUNTER — OFFICE VISIT (OUTPATIENT)
Dept: FAMILY MEDICINE CLINIC | Facility: CLINIC | Age: 53
End: 2018-02-27

## 2018-02-27 VITALS
WEIGHT: 149 LBS | SYSTOLIC BLOOD PRESSURE: 97 MMHG | HEIGHT: 62 IN | DIASTOLIC BLOOD PRESSURE: 64 MMHG | BODY MASS INDEX: 27.42 KG/M2 | TEMPERATURE: 98 F | HEART RATE: 75 BPM

## 2018-02-27 DIAGNOSIS — Z00.00 PHYSICAL EXAM: ICD-10-CM

## 2018-02-27 DIAGNOSIS — Z51.81 THERAPEUTIC DRUG MONITORING: ICD-10-CM

## 2018-02-27 DIAGNOSIS — K92.1 BLOOD IN STOOL: Primary | ICD-10-CM

## 2018-02-27 DIAGNOSIS — M32.9 SYSTEMIC LUPUS ERYTHEMATOSUS, UNSPECIFIED SLE TYPE, UNSPECIFIED ORGAN INVOLVEMENT STATUS (HCC): Primary | ICD-10-CM

## 2018-02-27 PROCEDURE — 99386 PREV VISIT NEW AGE 40-64: CPT | Performed by: FAMILY MEDICINE

## 2018-02-27 RX ORDER — PANTOPRAZOLE SODIUM 40 MG/1
40 TABLET, DELAYED RELEASE ORAL
Qty: 30 TABLET | Refills: 3 | Status: SHIPPED | OUTPATIENT
Start: 2018-02-27 | End: 2018-02-27

## 2018-02-27 RX ORDER — HYDROXYCHLOROQUINE SULFATE 200 MG/1
200 TABLET, FILM COATED ORAL 2 TIMES DAILY
Qty: 180 TABLET | Refills: 0 | Status: SHIPPED | OUTPATIENT
Start: 2018-02-27 | End: 2018-03-12

## 2018-02-27 RX ORDER — HYDROXYCHLOROQUINE SULFATE 200 MG/1
200 TABLET, FILM COATED ORAL 2 TIMES DAILY
Qty: 180 TABLET | Refills: 1 | Status: SHIPPED | OUTPATIENT
Start: 2018-02-27 | End: 2018-02-27

## 2018-02-27 RX ORDER — PANTOPRAZOLE SODIUM 40 MG/1
40 TABLET, DELAYED RELEASE ORAL
Qty: 90 TABLET | Refills: 1 | Status: SHIPPED | OUTPATIENT
Start: 2018-02-27 | End: 2019-01-19

## 2018-02-27 NOTE — TELEPHONE ENCOUNTER
Croatian speaker-    Pt states she wants rx sent to [facility]   [phone]

## 2018-02-27 NOTE — TELEPHONE ENCOUNTER
Pharmacy requesting refill for     Pantoprazole Sodium 40 MG Oral Tab EC Take 1 tablet (40 mg total) by mouth every morning before breakfast. Disp: 30 tablet Rfl: 3   Hydroxychloroquine Sulfate 200 MG Oral Tab Take 1 tablet (200 mg total) by mouth 2 (two)

## 2018-02-27 NOTE — TELEPHONE ENCOUNTER
Afghan speaking - pt stts she would like a refill on Rx pantoprazole and hydroxychlorquine.  Please advise       Current Outpatient Prescriptions:  antoprazole Sodium 40 MG Oral Tab EC Take 1 tablet (40 mg total) by mouth every morning before breakfast. Joshua Cary

## 2018-02-27 NOTE — TELEPHONE ENCOUNTER
LOV: 6/7/17 last eye exam 10/11/17  Future Appointments  Date Time Provider Devin Roblero   2/27/2018 4:45 PM Viktor Moran MD [facility]   3/12/2018 3:30 PM Tammy Gusman MD [facility]     Labs: 6/28/17 unremarkable

## 2018-03-05 ENCOUNTER — LAB ENCOUNTER (OUTPATIENT)
Dept: LAB | Facility: HOSPITAL | Age: 53
End: 2018-03-05
Attending: INTERNAL MEDICINE
Payer: COMMERCIAL

## 2018-03-05 DIAGNOSIS — D72.819 LEUKOPENIA, UNSPECIFIED TYPE: ICD-10-CM

## 2018-03-05 DIAGNOSIS — Z51.81 THERAPEUTIC DRUG MONITORING: ICD-10-CM

## 2018-03-05 DIAGNOSIS — M32.9 SYSTEMIC LUPUS ERYTHEMATOSUS, UNSPECIFIED SLE TYPE, UNSPECIFIED ORGAN INVOLVEMENT STATUS (HCC): ICD-10-CM

## 2018-03-05 LAB
ALBUMIN SERPL BCP-MCNC: 4.2 G/DL (ref 3.5–4.8)
ALBUMIN/GLOB SERPL: 1.5 {RATIO} (ref 1–2)
ALP SERPL-CCNC: 76 U/L (ref 32–100)
ALT SERPL-CCNC: 19 U/L (ref 14–54)
ANION GAP SERPL CALC-SCNC: 6 MMOL/L (ref 0–18)
AST SERPL-CCNC: 26 U/L (ref 15–41)
BACTERIA UR QL AUTO: NEGATIVE /HPF
BASOPHILS # BLD: 0 K/UL (ref 0–0.2)
BASOPHILS NFR BLD: 1 %
BILIRUB SERPL-MCNC: 0.7 MG/DL (ref 0.3–1.2)
BILIRUB UR QL: NEGATIVE
BUN SERPL-MCNC: 10 MG/DL (ref 8–20)
BUN/CREAT SERPL: 12.3 (ref 10–20)
CALCIUM SERPL-MCNC: 9.1 MG/DL (ref 8.5–10.5)
CHLORIDE SERPL-SCNC: 104 MMOL/L (ref 95–110)
CLARITY UR: CLEAR
CO2 SERPL-SCNC: 29 MMOL/L (ref 22–32)
COLOR UR: YELLOW
CREAT SERPL-MCNC: 0.81 MG/DL (ref 0.5–1.5)
CRP SERPL-MCNC: <0.5 MG/DL (ref 0–0.9)
EOSINOPHIL # BLD: 0.1 K/UL (ref 0–0.7)
EOSINOPHIL NFR BLD: 4 %
ERYTHROCYTE [DISTWIDTH] IN BLOOD BY AUTOMATED COUNT: 14.1 % (ref 11–15)
ERYTHROCYTE [SEDIMENTATION RATE] IN BLOOD: 9 MM/HR (ref 0–30)
GLOBULIN PLAS-MCNC: 2.8 G/DL (ref 2.5–3.7)
GLUCOSE SERPL-MCNC: 98 MG/DL (ref 70–99)
GLUCOSE UR-MCNC: NEGATIVE MG/DL
HCT VFR BLD AUTO: 39.6 % (ref 35–48)
HGB BLD-MCNC: 13 G/DL (ref 12–16)
KETONES UR-MCNC: NEGATIVE MG/DL
LYMPHOCYTES # BLD: 1.5 K/UL (ref 1–4)
LYMPHOCYTES NFR BLD: 38 %
MCH RBC QN AUTO: 29.8 PG (ref 27–32)
MCHC RBC AUTO-ENTMCNC: 32.9 G/DL (ref 32–37)
MCV RBC AUTO: 90.6 FL (ref 80–100)
MONOCYTES # BLD: 0.3 K/UL (ref 0–1)
MONOCYTES NFR BLD: 8 %
NEUTROPHILS # BLD AUTO: 2 K/UL (ref 1.8–7.7)
NEUTROPHILS NFR BLD: 49 %
NITRITE UR QL STRIP.AUTO: NEGATIVE
OSMOLALITY UR CALC.SUM OF ELEC: 287 MOSM/KG (ref 275–295)
PATIENT FASTING: NO
PH UR: 5 [PH] (ref 5–8)
PLATELET # BLD AUTO: 191 K/UL (ref 140–400)
PMV BLD AUTO: 8.9 FL (ref 7.4–10.3)
POTASSIUM SERPL-SCNC: 3.7 MMOL/L (ref 3.3–5.1)
PROT SERPL-MCNC: 7 G/DL (ref 5.9–8.4)
PROT UR-MCNC: 100 MG/DL
RBC # BLD AUTO: 4.37 M/UL (ref 3.7–5.4)
RBC #/AREA URNS AUTO: 4 /HPF
SODIUM SERPL-SCNC: 139 MMOL/L (ref 136–144)
SP GR UR STRIP: 1.02 (ref 1–1.03)
UROBILINOGEN UR STRIP-ACNC: <2
VIT C UR-MCNC: NEGATIVE MG/DL
WBC # BLD AUTO: 4 K/UL (ref 4–11)
WBC #/AREA URNS AUTO: 2 /HPF

## 2018-03-05 PROCEDURE — 85652 RBC SED RATE AUTOMATED: CPT

## 2018-03-05 PROCEDURE — 85025 COMPLETE CBC W/AUTO DIFF WBC: CPT

## 2018-03-05 PROCEDURE — 80053 COMPREHEN METABOLIC PANEL: CPT

## 2018-03-05 PROCEDURE — 86225 DNA ANTIBODY NATIVE: CPT

## 2018-03-05 PROCEDURE — 81001 URINALYSIS AUTO W/SCOPE: CPT

## 2018-03-05 PROCEDURE — 86140 C-REACTIVE PROTEIN: CPT

## 2018-03-05 PROCEDURE — 36415 COLL VENOUS BLD VENIPUNCTURE: CPT

## 2018-03-05 NOTE — PROGRESS NOTES
HPI:    Patient ID: Dallas Dover is a 47year old female. Patient trying to establish primary care. Overall doing well. Has SLE controlled, seeing rheumatologist for that. Also takes duloxetine for depression .    Had few times painless blood in the distal pulses. Pulmonary/Chest: Effort normal and breath sounds normal.   Abdominal: Soft. Bowel sounds are normal. She exhibits no distension. There is no rebound and no guarding.    Musculoskeletal:   Some arthritic changes hands              ASSESSMEN

## 2018-03-06 LAB — DSDNA AB TITR SER: <10 {TITER}

## 2018-03-12 ENCOUNTER — OFFICE VISIT (OUTPATIENT)
Dept: RHEUMATOLOGY | Facility: CLINIC | Age: 53
End: 2018-03-12

## 2018-03-12 VITALS
DIASTOLIC BLOOD PRESSURE: 71 MMHG | HEART RATE: 68 BPM | SYSTOLIC BLOOD PRESSURE: 111 MMHG | BODY MASS INDEX: 27.79 KG/M2 | WEIGHT: 151 LBS | HEIGHT: 62 IN

## 2018-03-12 DIAGNOSIS — Z51.81 THERAPEUTIC DRUG MONITORING: ICD-10-CM

## 2018-03-12 DIAGNOSIS — R80.9 PROTEINURIA, UNSPECIFIED TYPE: ICD-10-CM

## 2018-03-12 DIAGNOSIS — M32.14 SYSTEMIC LUPUS ERYTHEMATOSUS WITH GLOMERULAR DISEASE, UNSPECIFIED SLE TYPE (HCC): Primary | ICD-10-CM

## 2018-03-12 PROCEDURE — 99212 OFFICE O/P EST SF 10 MIN: CPT | Performed by: INTERNAL MEDICINE

## 2018-03-12 PROCEDURE — 99214 OFFICE O/P EST MOD 30 MIN: CPT | Performed by: INTERNAL MEDICINE

## 2018-03-12 RX ORDER — HYDROXYCHLOROQUINE SULFATE 200 MG/1
200 TABLET, FILM COATED ORAL 2 TIMES DAILY
Qty: 180 TABLET | Refills: 2 | Status: SHIPPED | OUTPATIENT
Start: 2018-03-12 | End: 2019-04-17

## 2018-03-12 RX ORDER — DULOXETIN HYDROCHLORIDE 30 MG/1
30 CAPSULE, DELAYED RELEASE ORAL DAILY
Qty: 30 CAPSULE | Refills: 0 | Status: SHIPPED | OUTPATIENT
Start: 2018-03-12 | End: 2018-10-05 | Stop reason: ALTCHOICE

## 2018-03-12 NOTE — PROGRESS NOTES
Luli Carranza is a 47year old female. HPI:   Patient presents with:  SLE  Finger Pain  Knee Pain      I had the pleasure of seeing Mrs. Luli Carranza on 3/12/2018. I ahd last seen her on 6/2017. She was last seen on 8/22/2016.  I had last seen her on back. It's not helping her knee. No faituge, no rash. Her back is better. No longer taking meloxicam or flexeril for her back. Her wbc is 3.0 -no infections, no fevers. She still getting midl headaches. Her eyes are tired.    She lost 3 pounds since Rfl: 3   ibuprofen 600 MG Oral Tab Take 1 tablet (600 mg total) by mouth every 8 (eight) hours as needed for Pain. Disp: 90 tablet Rfl: 2   Calcium Carbonate-Vitamin D (OYSTER SHELL CALCIUM 500 + D) 500-200 MG-UNIT Oral Tab Take 1 tablet by mouth daily.  Ta g/dl 32.9   RDW      11.0 - 15.0 % 14.1   Platelet Count      445 - 400 K/   MEAN PLATELET VOLUME      7.4 - 10.3 fL 8.9   Neutrophils %      % 49   Lymphocytes %      % 38   Monocytes %      % 8   Eosinophils %      % 4   Basophils %      % 1   Neut hydroxychlrooquine 200mg bid  -   -  Labs evvery 6 months -   - increasing porteinuria - d/w her to F/u with DR. Frank Hodge for lupus nephritis - check mircroalbumin  - IBUPROFEN 600mg FOR PAIN PRN or tyelnol arthritis 1-2 tablts fro apin - - she will call if

## 2018-03-12 NOTE — PATIENT INSTRUCTIONS
1.. Check urine tests   2. Cont. hydroxychlrqouine 200mg twice a day   3. Tylenol arthirits 1-2 tablets a day -   4. Return to clinci in 4-6 months. 5.  Follow up dr. Jennifer Cristina -   6.  fnishe duloxetine 60mg a day , then Decrease duloxetine to 30mg a day x

## 2018-03-31 ENCOUNTER — APPOINTMENT (OUTPATIENT)
Dept: LAB | Facility: HOSPITAL | Age: 53
End: 2018-03-31
Attending: INTERNAL MEDICINE
Payer: COMMERCIAL

## 2018-03-31 DIAGNOSIS — M32.14 SYSTEMIC LUPUS ERYTHEMATOSUS WITH GLOMERULAR DISEASE, UNSPECIFIED SLE TYPE (HCC): ICD-10-CM

## 2018-03-31 DIAGNOSIS — R80.9 PROTEINURIA, UNSPECIFIED TYPE: ICD-10-CM

## 2018-03-31 PROCEDURE — 82043 UR ALBUMIN QUANTITATIVE: CPT

## 2018-03-31 PROCEDURE — 82570 ASSAY OF URINE CREATININE: CPT

## 2018-04-06 ENCOUNTER — OFFICE VISIT (OUTPATIENT)
Dept: NEPHROLOGY | Facility: CLINIC | Age: 53
End: 2018-04-06

## 2018-04-06 VITALS
HEART RATE: 70 BPM | BODY MASS INDEX: 27.45 KG/M2 | HEIGHT: 62.5 IN | DIASTOLIC BLOOD PRESSURE: 60 MMHG | SYSTOLIC BLOOD PRESSURE: 101 MMHG | WEIGHT: 153 LBS

## 2018-04-06 DIAGNOSIS — Z87.448 H/O LUPUS NEPHRITIS: Primary | ICD-10-CM

## 2018-04-06 PROBLEM — Z87.39: Status: ACTIVE | Noted: 2018-04-06

## 2018-04-06 PROCEDURE — 99212 OFFICE O/P EST SF 10 MIN: CPT | Performed by: INTERNAL MEDICINE

## 2018-04-06 PROCEDURE — 99214 OFFICE O/P EST MOD 30 MIN: CPT | Performed by: INTERNAL MEDICINE

## 2018-04-06 NOTE — PROGRESS NOTES
4/06/18        Patient: Rory Reyes   YOB: 1965   Date of Visit: 4/6/2018       Dear  Dr. Syd Quijano MD,      Thank you for referring Rory Reyes to my practice. Please find my assessment and plan below.       As you know she is a 52-yea Otherwise I recommended just repeating a renal panel, urinalysis and urine for microalbumin in 3 months. Thank you again for allowing me to participate in the care of your patient. If you have any questions please feel free to call.            Sincerely

## 2018-07-13 ENCOUNTER — APPOINTMENT (OUTPATIENT)
Dept: LAB | Facility: HOSPITAL | Age: 53
End: 2018-07-13
Attending: INTERNAL MEDICINE
Payer: COMMERCIAL

## 2018-07-13 DIAGNOSIS — Z87.448 H/O LUPUS NEPHRITIS: ICD-10-CM

## 2018-07-13 LAB
ALBUMIN SERPL BCP-MCNC: 3.8 G/DL (ref 3.5–4.8)
ANION GAP SERPL CALC-SCNC: 5 MMOL/L (ref 0–18)
BILIRUB UR QL: NEGATIVE
BUN SERPL-MCNC: 9 MG/DL (ref 8–20)
BUN/CREAT SERPL: 14.5 (ref 10–20)
CALCIUM SERPL-MCNC: 8.7 MG/DL (ref 8.5–10.5)
CHLORIDE SERPL-SCNC: 106 MMOL/L (ref 95–110)
CLARITY UR: CLEAR
CO2 SERPL-SCNC: 28 MMOL/L (ref 22–32)
COLOR UR: YELLOW
CREAT SERPL-MCNC: 0.62 MG/DL (ref 0.5–1.5)
CREAT UR-MCNC: 50.9 MG/DL
GLUCOSE SERPL-MCNC: 97 MG/DL (ref 70–99)
GLUCOSE UR-MCNC: NEGATIVE MG/DL
HGB UR QL STRIP.AUTO: NEGATIVE
KETONES UR-MCNC: NEGATIVE MG/DL
LEUKOCYTE ESTERASE UR QL STRIP.AUTO: NEGATIVE
MICROALBUMIN UR-MCNC: 0.9 MG/DL (ref 0–1.8)
MICROALBUMIN/CREAT UR: 17.7 MG/G{CREAT} (ref 0–20)
NITRITE UR QL STRIP.AUTO: NEGATIVE
OSMOLALITY UR CALC.SUM OF ELEC: 287 MOSM/KG (ref 275–295)
PH UR: 7 [PH] (ref 5–8)
PHOSPHATE SERPL-MCNC: 4.3 MG/DL (ref 2.4–4.7)
POTASSIUM SERPL-SCNC: 3.6 MMOL/L (ref 3.3–5.1)
PROT UR-MCNC: NEGATIVE MG/DL
SODIUM SERPL-SCNC: 139 MMOL/L (ref 136–144)
SP GR UR STRIP: 1.01 (ref 1–1.03)
UROBILINOGEN UR STRIP-ACNC: <2
VIT C UR-MCNC: NEGATIVE MG/DL

## 2018-07-13 PROCEDURE — 81003 URINALYSIS AUTO W/O SCOPE: CPT

## 2018-07-13 PROCEDURE — 82043 UR ALBUMIN QUANTITATIVE: CPT

## 2018-07-13 PROCEDURE — 80069 RENAL FUNCTION PANEL: CPT

## 2018-07-13 PROCEDURE — 36415 COLL VENOUS BLD VENIPUNCTURE: CPT

## 2018-07-13 PROCEDURE — 82570 ASSAY OF URINE CREATININE: CPT

## 2018-07-14 ENCOUNTER — TELEPHONE (OUTPATIENT)
Dept: NEPHROLOGY | Facility: CLINIC | Age: 53
End: 2018-07-14

## 2018-07-14 DIAGNOSIS — Z87.448 HISTORY OF LUPUS NEPHRITIS: Primary | ICD-10-CM

## 2018-07-14 NOTE — TELEPHONE ENCOUNTER
Kidney function was normal and there is no protein in the urine. If doing well CPM.  Repeat same labs in 6 months.

## 2018-07-16 NOTE — TELEPHONE ENCOUNTER
Pt stopped by office. Notified her that her kidney function was normal and no protein was found in her urine. She is doing well so she will CPM and repeat labs in 6 months.

## 2018-07-18 NOTE — PROGRESS NOTES
[facility] Follow-up Visit    Vannesa IBD.    Prior endoscopies:  Denies    Social Hx:  - No smoking  + Occassionally etoh  - Denies illicit drug use   - LMP: Hysterectomy   - Lives with spouse  - NSAIDs/ASA use: Ibuprofen 600 mg PRN      History, Medications, Allergies, ROS:      Past Medical Rfl:    Vitamin D3 1000 units Oral Tab Take 1,000 Units by mouth 2 (two) times daily. Disp:  Rfl:    Diclofenac Sodium 1 % Transdermal Gel Apply 4 g topically 4 (four) times daily.  Disp: 1 Tube Rfl: 3   ibuprofen 600 MG Oral Tab Take 1 tablet (600 mg total Psych: Pt has a normal mood and affect, behavior is normal    Nursing note and vitals reviewed      Labs/Imaging:     Patient's labs and imaging were reviewed and discussed with patient today. See HPI and A&P for further details.      .  ASSESSMENT/PLAN: types were placed in this encounter.       Meds This Visit:    Signed Prescriptions Disp Refills    PEG 3350-KCl-NaBcb-NaCl-NaSulf (COLYTE WITH FLAVOR PACKS) 240 g Oral Recon Soln 1 Bottle 0      Sig: As directed per GI consult notes           Imaging & Ref

## 2018-07-24 ENCOUNTER — OFFICE VISIT (OUTPATIENT)
Dept: GASTROENTEROLOGY | Facility: CLINIC | Age: 53
End: 2018-07-24
Payer: COMMERCIAL

## 2018-07-24 ENCOUNTER — TELEPHONE (OUTPATIENT)
Dept: GASTROENTEROLOGY | Facility: CLINIC | Age: 53
End: 2018-07-24

## 2018-07-24 VITALS
WEIGHT: 155 LBS | SYSTOLIC BLOOD PRESSURE: 131 MMHG | BODY MASS INDEX: 27.46 KG/M2 | HEIGHT: 63 IN | DIASTOLIC BLOOD PRESSURE: 76 MMHG | HEART RATE: 71 BPM

## 2018-07-24 DIAGNOSIS — K59.00 CONSTIPATION, UNSPECIFIED CONSTIPATION TYPE: ICD-10-CM

## 2018-07-24 DIAGNOSIS — Z12.11 COLON CANCER SCREENING: Primary | ICD-10-CM

## 2018-07-24 DIAGNOSIS — Z12.11 SCREENING FOR COLON CANCER: Primary | ICD-10-CM

## 2018-07-24 DIAGNOSIS — K62.5 RECTAL BLEEDING: ICD-10-CM

## 2018-07-24 PROCEDURE — 99214 OFFICE O/P EST MOD 30 MIN: CPT | Performed by: NURSE PRACTITIONER

## 2018-07-24 PROCEDURE — 99212 OFFICE O/P EST SF 10 MIN: CPT | Performed by: NURSE PRACTITIONER

## 2018-07-24 NOTE — TELEPHONE ENCOUNTER
Scheduled for:  Colonoscopy 47936  Provider Name: Dr Amy Cartagena  Date:  Werner Kang 9/18/18  Location:  [address] [place] [address]  Sedation:  MAC  Time:  7:30 am  Prep: new split dose colyte  Meds/Allergies Reconciled?:  NKDA  Diagnosis with codes:  Colon cancer screening Z12.11  Was augustine

## 2018-07-24 NOTE — PATIENT INSTRUCTIONS
1. Schedule colonoscopy with Dr. Sen Worthington w/ MAC    2.  bowel prep from pharmacy - split dose Colyte    3. Continue all medications for procedure     4. Read all bowel prep instructions carefully    5.  AVOID seeds, nuts, popcorn, raw fruits and vegetab

## 2018-08-01 ENCOUNTER — OFFICE VISIT (OUTPATIENT)
Dept: OBGYN CLINIC | Facility: CLINIC | Age: 53
End: 2018-08-01
Payer: COMMERCIAL

## 2018-08-01 VITALS
WEIGHT: 154 LBS | HEIGHT: 63 IN | SYSTOLIC BLOOD PRESSURE: 108 MMHG | BODY MASS INDEX: 27.29 KG/M2 | HEART RATE: 74 BPM | DIASTOLIC BLOOD PRESSURE: 69 MMHG

## 2018-08-01 DIAGNOSIS — Z12.31 SCREENING MAMMOGRAM, ENCOUNTER FOR: ICD-10-CM

## 2018-08-01 DIAGNOSIS — Z01.419 WELL WOMAN EXAM WITH ROUTINE GYNECOLOGICAL EXAM: Primary | ICD-10-CM

## 2018-08-01 DIAGNOSIS — Z12.4 SCREENING FOR MALIGNANT NEOPLASM OF CERVIX: ICD-10-CM

## 2018-08-01 PROCEDURE — 99396 PREV VISIT EST AGE 40-64: CPT | Performed by: OBSTETRICS & GYNECOLOGY

## 2018-08-01 NOTE — PROGRESS NOTES
HPI:    Patient ID: Shae Gomez is a 47year old female. HPI  Well woman visit  No complaints. History of HALIMA for benign reasons. Not sexually active. No vaginal or pelvic complaints. Has colonoscopy scheduled. Has screening mammogram scheduled. use of Plaquenil 2013,2014    per ng w/ no toxicity   • Lupus 2008    per ng sistemic lupus erythematous ; prednison & plaquenil; lupus nephritis nephrotic range proteinuria   • Meibomian gland dysfunction 2013   • [facility] negative in the right inguinal area and negative in the left inguinal area. Lymphadenopathy:        Right: No inguinal adenopathy present. Left: No inguinal adenopathy present. Breasts:    Symmetric bilaterally. Areolas without lesions.   Skin

## 2018-08-24 ENCOUNTER — TELEPHONE (OUTPATIENT)
Dept: GASTROENTEROLOGY | Facility: CLINIC | Age: 53
End: 2018-08-24

## 2018-08-24 DIAGNOSIS — Z12.11 COLON CANCER SCREENING: Primary | ICD-10-CM

## 2018-08-24 NOTE — TELEPHONE ENCOUNTER
Patient contacted and  states she called her insurance company to check on coverage and they told her she would have to pay $2,500 out of pocket if she has it done at [facility]. Patient asked me to call her insurance to confirm.   I called  BC

## 2018-08-25 ENCOUNTER — HOSPITAL ENCOUNTER (OUTPATIENT)
Dept: MAMMOGRAPHY | Facility: HOSPITAL | Age: 53
Discharge: HOME | End: 2018-08-25
Attending: OBSTETRICS & GYNECOLOGY
Payer: COMMERCIAL

## 2018-08-25 DIAGNOSIS — Z12.31 SCREENING MAMMOGRAM, ENCOUNTER FOR: ICD-10-CM

## 2018-08-25 PROCEDURE — 77067 SCR MAMMO BI INCL CAD: CPT | Performed by: OBSTETRICS & GYNECOLOGY

## 2018-08-27 ENCOUNTER — TELEPHONE (OUTPATIENT)
Dept: OBGYN CLINIC | Facility: CLINIC | Age: 53
End: 2018-08-27

## 2018-08-27 NOTE — TELEPHONE ENCOUNTER
----- Message from Ricardo Dee MD sent at 8/27/2018  3:46 PM CDT -----  Please notify of normal mammogram by either MyChart or mail.

## 2018-08-30 NOTE — TELEPHONE ENCOUNTER
Rescheduled for:  Colonoscopy [id number]  Provider Name: Dr. Gatito Ley  Date:    From-9/18/18  To-11/16/18  Location:   From[facility]  To[facility]  Sedation:  MAC  Time:  0730 (pt is aware to arrive at 0630)   Prep:  [facility], mailed new instructions on 8/31/18  Meds/Allergie

## 2018-08-31 ENCOUNTER — TELEPHONE (OUTPATIENT)
Dept: FAMILY MEDICINE CLINIC | Facility: CLINIC | Age: 53
End: 2018-08-31

## 2018-08-31 ENCOUNTER — HOSPITAL ENCOUNTER (OUTPATIENT)
Age: 53
Discharge: HOME | End: 2018-08-31
Attending: EMERGENCY MEDICINE
Payer: COMMERCIAL

## 2018-08-31 ENCOUNTER — TELEPHONE (OUTPATIENT)
Dept: OTHER | Age: 53
End: 2018-08-31

## 2018-08-31 ENCOUNTER — NURSE TRIAGE (OUTPATIENT)
Dept: OTHER | Age: 53
End: 2018-08-31

## 2018-08-31 VITALS
DIASTOLIC BLOOD PRESSURE: 79 MMHG | OXYGEN SATURATION: 100 % | SYSTOLIC BLOOD PRESSURE: 114 MMHG | RESPIRATION RATE: 18 BRPM | TEMPERATURE: 98 F | HEIGHT: 63 IN | WEIGHT: 156 LBS | HEART RATE: 71 BPM | BODY MASS INDEX: 27.64 KG/M2

## 2018-08-31 DIAGNOSIS — N30.01 ACUTE CYSTITIS WITH HEMATURIA: Primary | ICD-10-CM

## 2018-08-31 DIAGNOSIS — Z00.00 ROUTINE GENERAL MEDICAL EXAMINATION AT A HEALTH CARE FACILITY: Primary | ICD-10-CM

## 2018-08-31 LAB
BILIRUB UR QL STRIP: NEGATIVE
CLARITY UR: CLEAR
COLOR UR: YELLOW
GLUCOSE UR STRIP-MCNC: NEGATIVE MG/DL
KETONES UR STRIP-MCNC: NEGATIVE MG/DL
NITRITE UR QL STRIP: NEGATIVE
PH UR STRIP: 7 [PH]
PROT UR STRIP-MCNC: 30 MG/DL
SP GR UR STRIP: 1.02
UROBILINOGEN UR STRIP-ACNC: <2 MG/DL

## 2018-08-31 PROCEDURE — 87086 URINE CULTURE/COLONY COUNT: CPT | Performed by: EMERGENCY MEDICINE

## 2018-08-31 PROCEDURE — 99204 OFFICE O/P NEW MOD 45 MIN: CPT

## 2018-08-31 PROCEDURE — 81003 URINALYSIS AUTO W/O SCOPE: CPT

## 2018-08-31 PROCEDURE — 99214 OFFICE O/P EST MOD 30 MIN: CPT

## 2018-08-31 RX ORDER — PHENAZOPYRIDINE HYDROCHLORIDE 200 MG/1
200 TABLET, FILM COATED ORAL 3 TIMES DAILY
Qty: 6 TABLET | Refills: 0 | Status: SHIPPED | OUTPATIENT
Start: 2018-08-31 | End: 2018-09-02

## 2018-08-31 RX ORDER — CEFADROXIL 500 MG/1
500 CAPSULE ORAL 2 TIMES DAILY
Qty: 14 CAPSULE | Refills: 0 | Status: SHIPPED | OUTPATIENT
Start: 2018-08-31 | End: 2018-09-07

## 2018-08-31 NOTE — TELEPHONE ENCOUNTER
Action Requested: Summary for Provider     []  Critical Lab, Recommendations Needed  [] Need Additional Advice  []   FYI    []   Need Orders  [] Need Medications Sent to Pharmacy  []  Other     SUMMARY: Pt requesting to see You today-no appt available - s/

## 2018-08-31 NOTE — ED PROVIDER NOTES
Patient Seen in: [address]    History   Patient presents with:  Urinary Symptoms (urologic)    Stated Complaint: uti symptoms     HPI    Patient complains of burning with urination for the last 2 days with lower abdomin [08/31/18 1651]  BP: 114/79  Pulse: 71  Resp: 18  Temp: 98 °F (36.7 °C)  Temp src: Oral  SpO2: 100 %  O2 Device: None (Room air)    Current:/79   Pulse 71   Temp 98 °F (36.7 °C) (Oral)   Resp 18   Ht 160 cm (5' 3\")   Wt 70.8 kg   SpO2 100%   BMI 27.

## 2018-08-31 NOTE — TELEPHONE ENCOUNTER
Patient called back, advised to go to [facility], she said she'd go to [facility] as it's closest to [facility] where she works. Gave her address and phone # for Ashlee Nguyen.

## 2018-08-31 NOTE — ED INITIAL ASSESSMENT (HPI)
Urinary symptoms for 3 days now. Patient complains of burning, urgency, and frequency. Patient denies any fevers, Patient has lower back pain and pelvic pain. Denies n/v/d. Denies OTC meds PTA.

## 2018-08-31 NOTE — TELEPHONE ENCOUNTER
Dr Batsheva Bush, please advise. Patient has a form, needs physical exam information on it, to be signed by her doctor and sent to her insurance for a discount. She will either drop it off at [facility] office or fax it to the office. Gave her [employer] #.

## 2018-08-31 NOTE — TELEPHONE ENCOUNTER
Patient dropped off form and put in drs folder at [place]. Please fax to # on form when complete. Please call patient when done.

## 2018-09-01 NOTE — TELEPHONE ENCOUNTER
Pt was treated/released from [address] [place] [address] earlier today and dc'd with meds and advised if not feeling better in 4 days to f/u with PCP. Closing encounter.

## 2018-09-05 NOTE — TELEPHONE ENCOUNTER
Dr Cristopher Urbano pt was last seen in Feb. Can form be filled out using this or needs new visit? Pt will need labs, pended.

## 2018-09-05 NOTE — TELEPHONE ENCOUNTER
Patient is calling regarding the status of the form. Would like to know if she needs to make an appointment or can the form be completed without being seen.

## 2018-09-18 ENCOUNTER — TELEPHONE (OUTPATIENT)
Dept: RHEUMATOLOGY | Facility: CLINIC | Age: 53
End: 2018-09-18

## 2018-09-18 DIAGNOSIS — M32.9 SYSTEMIC LUPUS ERYTHEMATOSUS, UNSPECIFIED SLE TYPE, UNSPECIFIED ORGAN INVOLVEMENT STATUS (HCC): Primary | ICD-10-CM

## 2018-09-18 DIAGNOSIS — Z51.81 THERAPEUTIC DRUG MONITORING: ICD-10-CM

## 2018-09-19 NOTE — TELEPHONE ENCOUNTER
LM informing pt that labs have been ordered by Dr. Belkys Madrigal. Pt encouraged to complete labs prior to upcoming appt and call office back if any questions/concerns arise.

## 2018-10-02 ENCOUNTER — LAB ENCOUNTER (OUTPATIENT)
Dept: LAB | Facility: HOSPITAL | Age: 53
End: 2018-10-02
Attending: FAMILY MEDICINE
Payer: COMMERCIAL

## 2018-10-02 DIAGNOSIS — Z00.00 ROUTINE GENERAL MEDICAL EXAMINATION AT A HEALTH CARE FACILITY: ICD-10-CM

## 2018-10-02 PROCEDURE — 36415 COLL VENOUS BLD VENIPUNCTURE: CPT

## 2018-10-02 PROCEDURE — 80053 COMPREHEN METABOLIC PANEL: CPT

## 2018-10-02 PROCEDURE — 85025 COMPLETE CBC W/AUTO DIFF WBC: CPT

## 2018-10-02 PROCEDURE — 84443 ASSAY THYROID STIM HORMONE: CPT

## 2018-10-02 PROCEDURE — 80061 LIPID PANEL: CPT

## 2018-10-05 ENCOUNTER — OFFICE VISIT (OUTPATIENT)
Dept: FAMILY MEDICINE CLINIC | Facility: CLINIC | Age: 53
End: 2018-10-05
Payer: COMMERCIAL

## 2018-10-05 VITALS
TEMPERATURE: 97 F | HEART RATE: 60 BPM | BODY MASS INDEX: 28 KG/M2 | HEIGHT: 63 IN | DIASTOLIC BLOOD PRESSURE: 81 MMHG | SYSTOLIC BLOOD PRESSURE: 124 MMHG

## 2018-10-05 DIAGNOSIS — Z00.00 ANNUAL PHYSICAL EXAM: Primary | ICD-10-CM

## 2018-10-05 PROCEDURE — 90471 IMMUNIZATION ADMIN: CPT | Performed by: FAMILY MEDICINE

## 2018-10-05 PROCEDURE — 99396 PREV VISIT EST AGE 40-64: CPT | Performed by: FAMILY MEDICINE

## 2018-10-05 PROCEDURE — 90686 IIV4 VACC NO PRSV 0.5 ML IM: CPT | Performed by: FAMILY MEDICINE

## 2018-10-05 NOTE — PROGRESS NOTES
HPI:    Patient ID: Michelle Cowden is a 47year old female. HPI    Review of Systems   Constitutional: Negative for activity change, appetite change, chills, diaphoresis, fatigue and fever. HENT: Negative. Respiratory: Negative.   Negative for choki ASSESSMENT/PLAN:   Annual physical   Doing well. Needs to increase exercise    No orders of the defined types were placed in this encounter.       Meds This Visit:  Requested Prescriptions      No prescriptions requested or ordered in this enco

## 2018-10-10 ENCOUNTER — TELEPHONE (OUTPATIENT)
Dept: FAMILY MEDICINE CLINIC | Facility: CLINIC | Age: 53
End: 2018-10-10

## 2018-10-25 ENCOUNTER — APPOINTMENT (OUTPATIENT)
Dept: LAB | Facility: HOSPITAL | Age: 53
End: 2018-10-25
Attending: INTERNAL MEDICINE
Payer: COMMERCIAL

## 2018-10-25 DIAGNOSIS — M32.9 SYSTEMIC LUPUS ERYTHEMATOSUS, UNSPECIFIED SLE TYPE, UNSPECIFIED ORGAN INVOLVEMENT STATUS (HCC): ICD-10-CM

## 2018-10-25 DIAGNOSIS — Z51.81 THERAPEUTIC DRUG MONITORING: ICD-10-CM

## 2018-10-25 PROCEDURE — 86140 C-REACTIVE PROTEIN: CPT

## 2018-10-25 PROCEDURE — 81001 URINALYSIS AUTO W/SCOPE: CPT

## 2018-10-25 PROCEDURE — 86225 DNA ANTIBODY NATIVE: CPT

## 2018-10-25 PROCEDURE — 85652 RBC SED RATE AUTOMATED: CPT

## 2018-10-25 PROCEDURE — 80053 COMPREHEN METABOLIC PANEL: CPT

## 2018-10-25 PROCEDURE — 36415 COLL VENOUS BLD VENIPUNCTURE: CPT

## 2018-10-25 PROCEDURE — 85027 COMPLETE CBC AUTOMATED: CPT

## 2018-10-26 ENCOUNTER — OFFICE VISIT (OUTPATIENT)
Dept: FAMILY MEDICINE CLINIC | Facility: CLINIC | Age: 53
End: 2018-10-26
Payer: COMMERCIAL

## 2018-10-26 VITALS
HEIGHT: 63 IN | TEMPERATURE: 97 F | DIASTOLIC BLOOD PRESSURE: 80 MMHG | SYSTOLIC BLOOD PRESSURE: 120 MMHG | BODY MASS INDEX: 27.22 KG/M2 | HEART RATE: 76 BPM | WEIGHT: 153.63 LBS

## 2018-10-26 DIAGNOSIS — H01.009 BLEPHARITIS, UNSPECIFIED LATERALITY, UNSPECIFIED TYPE: Primary | ICD-10-CM

## 2018-10-26 PROCEDURE — 99212 OFFICE O/P EST SF 10 MIN: CPT | Performed by: FAMILY MEDICINE

## 2018-10-26 PROCEDURE — 99213 OFFICE O/P EST LOW 20 MIN: CPT | Performed by: FAMILY MEDICINE

## 2018-10-26 RX ORDER — OLOPATADINE HYDROCHLORIDE 1 MG/ML
1 SOLUTION/ DROPS OPHTHALMIC 2 TIMES DAILY
Qty: 1 BOTTLE | Refills: 0 | Status: SHIPPED | OUTPATIENT
Start: 2018-10-26 | End: 2019-10-30 | Stop reason: ALTCHOICE

## 2018-10-26 RX ORDER — ERYTHROMYCIN 5 MG/G
1 OINTMENT OPHTHALMIC NIGHTLY
Qty: 1 PACKET | Refills: 0 | Status: SHIPPED | OUTPATIENT
Start: 2018-10-26 | End: 2019-10-30 | Stop reason: ALTCHOICE

## 2018-10-26 NOTE — PROGRESS NOTES
HPI:    Patient ID: Tricia Costa is a 47year old female. Patient with redness upper eye lid and swelling and itching. Slight runny nose also. Review of Systems   Constitutional: Negative.   Negative for activity change, appetite change, chills unspecified type  (primary encounter diagnosis)  Plan: appears viral  patanol eye drops for itching if any worsening or more thick discharge may start don eye oint      No orders of the defined types were placed in this encounter.       Meds This Visit:  Re

## 2018-11-01 ENCOUNTER — OFFICE VISIT (OUTPATIENT)
Dept: OPHTHALMOLOGY | Facility: CLINIC | Age: 53
End: 2018-11-01
Payer: COMMERCIAL

## 2018-11-01 DIAGNOSIS — H00.11 CHALAZION OF RIGHT UPPER EYELID: ICD-10-CM

## 2018-11-01 DIAGNOSIS — Z79.899 LONG-TERM USE OF PLAQUENIL: Primary | ICD-10-CM

## 2018-11-01 DIAGNOSIS — H25.13 AGE-RELATED NUCLEAR CATARACT OF BOTH EYES: ICD-10-CM

## 2018-11-01 DIAGNOSIS — H04.123 CHRONIC DRYNESS OF BOTH EYES: ICD-10-CM

## 2018-11-01 PROCEDURE — 92014 COMPRE OPH EXAM EST PT 1/>: CPT | Performed by: OPHTHALMOLOGY

## 2018-11-01 NOTE — PROGRESS NOTES
Asad Almonte is a 47year old female. HPI:     HPI     Pt is here for a Plaquenil exam, per Dr David Soriano. Pt was diagnosed with Lupus in 2008 and is taking Plaquenil 200 mg BID. Pt has appointment with Dr David Soriano on 11/02/18.   Pt states she saw Cousin Male    • No Known Problems Paternal Cousin Female    • No Known Problems Paternal Cousin Male    • No Known Problems Other    • Glaucoma Neg    • Macular degeneration Neg    • Breast Cancer Neg    • Ovarian Cancer Neg    • DCIS Neg    • LCIS Neg Additional Tests     Amsler       Right Left     Normal Normal          Color       Right Left    Ishihara 8/8 8/8            Slit Lamp and Fundus Exam     Slit Lamp Exam       Right Left    Lids/Lashes Dermatochalasis, Meibomian gland dysfuncti per hour to try and have chalazion to open and drain. Told patient that since this has been there for some time, it most likely will not resolve on its own, so patient could consider excision in the future.   Discussed risks, benefits, treatments and alter

## 2018-11-01 NOTE — ASSESSMENT & PLAN NOTE
Diagnosis discussed with patient. Chalazion info given. Told patient to use warm compresses 3-4 times per hour to try and have chalazion to open and drain.   Told patient that since this has been there for some time, it most likely will not resolve on it

## 2018-11-01 NOTE — PATIENT INSTRUCTIONS
Long-term use of Plaquenil   No evidence of plaquenil toxicity in either eye. Will follow in 1 year for a plaquenil eye exam based on current guidelines.    Patient is approved to continue on plaquenil as directed by their PCP or rheumatologist.        Chr

## 2018-11-02 ENCOUNTER — OFFICE VISIT (OUTPATIENT)
Dept: RHEUMATOLOGY | Facility: CLINIC | Age: 53
End: 2018-11-02
Payer: COMMERCIAL

## 2018-11-02 VITALS
HEIGHT: 61.5 IN | WEIGHT: 152 LBS | BODY MASS INDEX: 28.33 KG/M2 | DIASTOLIC BLOOD PRESSURE: 85 MMHG | HEART RATE: 73 BPM | SYSTOLIC BLOOD PRESSURE: 129 MMHG

## 2018-11-02 DIAGNOSIS — M32.9 SYSTEMIC LUPUS ERYTHEMATOSUS, UNSPECIFIED SLE TYPE, UNSPECIFIED ORGAN INVOLVEMENT STATUS (HCC): Primary | ICD-10-CM

## 2018-11-02 DIAGNOSIS — Z51.81 THERAPEUTIC DRUG MONITORING: ICD-10-CM

## 2018-11-02 PROCEDURE — 99212 OFFICE O/P EST SF 10 MIN: CPT | Performed by: INTERNAL MEDICINE

## 2018-11-02 PROCEDURE — 99214 OFFICE O/P EST MOD 30 MIN: CPT | Performed by: INTERNAL MEDICINE

## 2018-11-02 NOTE — PROGRESS NOTES
Luli Carranza is a 47year old female. HPI:   Patient presents with:  SLE  Back Pain      I had the pleasure of seeing Mrs. Luli Carranza on 11/12/2018. I had last seen her on 3/12/2018. I ahd last seen her on 6/2017. She was last seen on 8/22/2016.  I daily. Disp: 180 tablet Rfl: 2   Pantoprazole Sodium 40 MG Oral Tab EC Take 1 tablet (40 mg total) by mouth every morning before breakfast. Disp: 90 tablet Rfl: 1   Omega-3 Fatty Acids (FISH OIL OR) Take by mouth daily.  Disp:  Rfl:    Vitamin D3 1000 units CALCULATED OSMOLALITY      275 - 295 mOsm/kg 291   GFR, Non-      >=60 >60   GFR, -American      >=60 >60   Patient Fasting?        No   URINE-COLOR      Yellow Yellow   CLARITY URINE      Clear Clear   SPECIFIC GRAVITY      1.002 - 200mg bid  -   - off azathiorpin 50mg a day now -  -  Labs evvery 6 -9 months -   - mild porteinuria -has seen dr. Suri Lang in the past   - IBUPROFEN 600mg FOR PAIN PRN or tyelnol arthritis 1-2 tablts fro apin - - she will call if pain increases, she doesn'

## 2018-11-02 NOTE — PATIENT INSTRUCTIONS
1..Cont. hydroxychlrqouine 200mg twice a day   2.. Tylenol arthirits 1-2 tablets a day -   3. Return to [facility] in 6-9 months. 4. voltaren gel 1 % as needed  5. Check labs in 6-9 months.

## 2018-11-12 ENCOUNTER — TELEPHONE (OUTPATIENT)
Dept: GASTROENTEROLOGY | Facility: CLINIC | Age: 53
End: 2018-11-12

## 2018-11-12 NOTE — TELEPHONE ENCOUNTER
Pt requesting to omega salguero rn regarding upcoming cln procedure po 11/16. Pt would like to know how many days before the procedure does she need to stop taking her current medications, pls call with  at:[phone],thanks.

## 2018-11-12 NOTE — TELEPHONE ENCOUNTER
Pt contacted and reviewed prep/med instructions w/ her in detail, she verbalized understanding of all and did not have further questions or concerns at this time.

## 2018-11-15 ENCOUNTER — TELEPHONE (OUTPATIENT)
Dept: GASTROENTEROLOGY | Facility: CLINIC | Age: 53
End: 2018-11-15

## 2018-11-15 NOTE — TELEPHONE ENCOUNTER
Pt. wants to know what she is able to drink or eat after starting prep solution? Pt. States that her CLN is sched for tomorrow Fri. 11/15/18. Pt.  Also has questions about prep that she picked up, as she thinks that it is not the same name that was prescrib

## 2018-11-16 ENCOUNTER — TELEPHONE (OUTPATIENT)
Dept: GASTROENTEROLOGY | Facility: CLINIC | Age: 53
End: 2018-11-16

## 2018-11-16 ENCOUNTER — ANESTHESIA EVENT (OUTPATIENT)
Dept: ENDOSCOPY | Facility: HOSPITAL | Age: 53
End: 2018-11-16
Payer: COMMERCIAL

## 2018-11-16 ENCOUNTER — HOSPITAL ENCOUNTER (OUTPATIENT)
Facility: HOSPITAL | Age: 53
Setting detail: HOSPITAL OUTPATIENT SURGERY
Discharge: HOME | End: 2018-11-16
Attending: INTERNAL MEDICINE | Admitting: INTERNAL MEDICINE
Payer: COMMERCIAL

## 2018-11-16 ENCOUNTER — ANESTHESIA (OUTPATIENT)
Dept: ENDOSCOPY | Facility: HOSPITAL | Age: 53
End: 2018-11-16
Payer: COMMERCIAL

## 2018-11-16 DIAGNOSIS — Z12.11 COLON CANCER SCREENING: ICD-10-CM

## 2018-11-16 PROCEDURE — 45378 DIAGNOSTIC COLONOSCOPY: CPT | Performed by: INTERNAL MEDICINE

## 2018-11-16 PROCEDURE — 0DJD8ZZ INSPECTION OF LOWER INTESTINAL TRACT, VIA NATURAL OR ARTIFICIAL OPENING ENDOSCOPIC: ICD-10-PCS | Performed by: INTERNAL MEDICINE

## 2018-11-16 RX ORDER — SODIUM CHLORIDE, SODIUM LACTATE, POTASSIUM CHLORIDE, CALCIUM CHLORIDE 600; 310; 30; 20 MG/100ML; MG/100ML; MG/100ML; MG/100ML
INJECTION, SOLUTION INTRAVENOUS CONTINUOUS
Status: DISCONTINUED | OUTPATIENT
Start: 2018-11-16 | End: 2018-11-16

## 2018-11-16 RX ORDER — HYDROCODONE BITARTRATE AND ACETAMINOPHEN 5; 325 MG/1; MG/1
1 TABLET ORAL AS NEEDED
Status: DISCONTINUED | OUTPATIENT
Start: 2018-11-16 | End: 2018-11-16

## 2018-11-16 RX ORDER — ONDANSETRON 2 MG/ML
4 INJECTION INTRAMUSCULAR; INTRAVENOUS ONCE AS NEEDED
Status: DISCONTINUED | OUTPATIENT
Start: 2018-11-16 | End: 2018-11-16

## 2018-11-16 RX ORDER — NALOXONE HYDROCHLORIDE 0.4 MG/ML
80 INJECTION, SOLUTION INTRAMUSCULAR; INTRAVENOUS; SUBCUTANEOUS AS NEEDED
Status: DISCONTINUED | OUTPATIENT
Start: 2018-11-16 | End: 2018-11-16

## 2018-11-16 RX ORDER — MIDAZOLAM HYDROCHLORIDE 1 MG/ML
INJECTION INTRAMUSCULAR; INTRAVENOUS AS NEEDED
Status: DISCONTINUED | OUTPATIENT
Start: 2018-11-16 | End: 2018-11-16 | Stop reason: SURG

## 2018-11-16 RX ORDER — MORPHINE SULFATE 4 MG/ML
4 INJECTION, SOLUTION INTRAMUSCULAR; INTRAVENOUS EVERY 10 MIN PRN
Status: DISCONTINUED | OUTPATIENT
Start: 2018-11-16 | End: 2018-11-16

## 2018-11-16 RX ORDER — HYDROCODONE BITARTRATE AND ACETAMINOPHEN 5; 325 MG/1; MG/1
2 TABLET ORAL AS NEEDED
Status: DISCONTINUED | OUTPATIENT
Start: 2018-11-16 | End: 2018-11-16

## 2018-11-16 RX ORDER — HALOPERIDOL 5 MG/ML
0.25 INJECTION INTRAMUSCULAR ONCE AS NEEDED
Status: DISCONTINUED | OUTPATIENT
Start: 2018-11-16 | End: 2018-11-16

## 2018-11-16 RX ORDER — MORPHINE SULFATE 10 MG/ML
6 INJECTION, SOLUTION INTRAMUSCULAR; INTRAVENOUS EVERY 10 MIN PRN
Status: DISCONTINUED | OUTPATIENT
Start: 2018-11-16 | End: 2018-11-16

## 2018-11-16 RX ORDER — MORPHINE SULFATE 4 MG/ML
2 INJECTION, SOLUTION INTRAMUSCULAR; INTRAVENOUS EVERY 10 MIN PRN
Status: DISCONTINUED | OUTPATIENT
Start: 2018-11-16 | End: 2018-11-16

## 2018-11-16 RX ADMIN — SODIUM CHLORIDE, SODIUM LACTATE, POTASSIUM CHLORIDE, CALCIUM CHLORIDE: 600; 310; 30; 20 INJECTION, SOLUTION INTRAVENOUS at 07:43:00

## 2018-11-16 RX ADMIN — SODIUM CHLORIDE, SODIUM LACTATE, POTASSIUM CHLORIDE, CALCIUM CHLORIDE: 600; 310; 30; 20 INJECTION, SOLUTION INTRAVENOUS at 08:06:00

## 2018-11-16 RX ADMIN — MIDAZOLAM HYDROCHLORIDE 2 MG: 1 INJECTION INTRAMUSCULAR; INTRAVENOUS at 07:44:00

## 2018-11-16 NOTE — ANESTHESIA PREPROCEDURE EVALUATION
Anesthesia PreOp Note    HPI:     Joey Chahal is a 47year old female who presents for preoperative consultation requested by: Alfonzo Jolly MD    Date of Surgery: 11/16/2018    Procedure(s):  COLONOSCOPY  Indication: Colon cancer screening    Relev Solution Place 1 drop into the right eye 2 (two) times daily. Disp: 1 Bottle Rfl: 0 11/9/2018   Hydroxychloroquine Sulfate 200 MG Oral Tab Take 1 tablet (200 mg total) by mouth 2 (two) times daily.  Disp: 180 tablet Rfl: 2 11/14/2018   Pantoprazole Sodium 4       Spouse name: Not on file      Number of children: Not on file      Years of education: Not on file      Highest education level: Not on file    Social Needs      Financial resource strain: Not on file      Food insecurity - worry: Not on file 10/25/2018     10/25/2018    CO2 29 10/25/2018    BUN 10 10/25/2018    CREATSERUM 0.54 10/25/2018    GLU 95 10/25/2018    CA 9.3 10/25/2018          Vital Signs: Body mass index is 27.98 kg/m².    height is 1.575 m (5' 2\") and weight is 69.4 kg (153

## 2018-11-16 NOTE — H&P
History & Physical Examination    Patient Name: Radha Murangel  MRN: [id number]  [facility]: [id number]  YOB: 1965    Diagnosis: colon screening, rectal bleeding        Medications Prior to Admission:  erythromycin 5 MG/GM Ophthalmic Ointment Apply Prochlorperazine Edisylate (COMPAZINE) injection 5 mg 5 mg Intravenous Once PRN   haloperidol lactate (HALDOL) 5 MG/ML injection 0.25 mg 0.25 mg Intravenous Once PRN   Naloxone HCl (NARCAN) 0.4 MG/ML injection 80 mcg 80 mcg Intravenous PRN     [facility] Paternal Cousin Male    • No Known Problems Other    • Glaucoma Neg    • Macular degeneration Neg    • Breast Cancer Neg    • Ovarian Cancer Neg    • DCIS Neg    • LCIS Neg    • BRCA gene + Neg    • Ashkenazi Buddhist Descent Neg      Social History    [place]

## 2018-11-16 NOTE — ANESTHESIA PREPROCEDURE EVALUATION
Anesthesia PreOp Note    HPI:     Alvaro Valenzuela is a 47year old female who presents for preoperative consultation requested by: Leola Lopes MD    Date of Surgery: 11/16/2018    Procedure(s):  COLONOSCOPY  Indication: Colon cancer screening    Relev Solution Place 1 drop into the right eye 2 (two) times daily. Disp: 1 Bottle Rfl: 0 11/9/2018   Hydroxychloroquine Sulfate 200 MG Oral Tab Take 1 tablet (200 mg total) by mouth 2 (two) times daily.  Disp: 180 tablet Rfl: 2 11/14/2018   Pantoprazole Sodium 4 injection 5 mg 5 mg Intravenous Once PRN Reagan Shepard MD     haloperidol lactate (HALDOL) 5 MG/ML injection 0.25 mg 0.25 mg Intravenous Once PRN Reagan Shepard MD     Naloxone HCl (NARCAN) 0.4 MG/ML injection 80 mcg 80 mcg Intravenous PRN Karina inability: Not on file      Transportation needs - medical: Not on file      Transportation needs - non-medical: Not on file    Occupational History      Not on file    Tobacco Use      Smoking status: Never Smoker      Smokeless tobacco: Never Used    Sub 66. Her respiration is 14 and oxygen saturation is 98%.     11/16/18  0658 11/16/18  0712   BP: 116/70    BP Location: Left arm    Pulse: 66    Resp: 14    SpO2: 98%    Weight:  153 lb   Height:  5' 2\"        Anesthesia ROS/Med Hx and Physical Exam     Sofia Zaman

## 2018-11-16 NOTE — OPERATIVE REPORT
[facility] Endoscopy Report      Preoperative Diagnosis:  - colon screening   - rectal bleeding      Postoperative Diagnosis:  - diverticulosis  - internal hemorrhoids      Procedure:    Colonoscopy     Surgeon:  CHAD Caraballo

## 2018-11-16 NOTE — ANESTHESIA POSTPROCEDURE EVALUATION
Patient: Moshe Khalil    Procedure Summary     Date:  11/16/18 Room / Location:  [address] [place] [address] ENDOSCOPY 01 / [address] [place] [address] ENDOSCOPY    Anesthesia Start:  8763 Anesthesia Stop:  9026    Procedure:  COLONOSCOPY (N/A ) Diagnosis:       Colon cancer screening      (hemorrhoid

## 2018-11-16 NOTE — TELEPHONE ENCOUNTER
10  Year colonoscopy recall placed in system per Dr. Ihsan Serrato done on  11/16/18 . Next due on 11/16/28 .  Snapshot updated

## 2018-11-17 VITALS
WEIGHT: 153 LBS | SYSTOLIC BLOOD PRESSURE: 130 MMHG | HEIGHT: 62 IN | HEART RATE: 51 BPM | DIASTOLIC BLOOD PRESSURE: 83 MMHG | BODY MASS INDEX: 28.16 KG/M2 | RESPIRATION RATE: 13 BRPM | OXYGEN SATURATION: 100 %

## 2019-01-19 RX ORDER — PANTOPRAZOLE SODIUM 40 MG/1
40 TABLET, DELAYED RELEASE ORAL
Qty: 90 TABLET | Refills: 1 | Status: SHIPPED | OUTPATIENT
Start: 2019-01-19 | End: 2020-01-19

## 2019-01-20 NOTE — TELEPHONE ENCOUNTER
Refill Protocol Appointment Criteria  · Appointment scheduled in the past 12 months or in the next 3 months  Recent Outpatient Visits            2 months ago Systemic lupus erythematosus, unspecified SLE type, unspecified organ involvement status [facility]

## 2019-02-15 ENCOUNTER — TELEPHONE (OUTPATIENT)
Dept: NEPHROLOGY | Facility: CLINIC | Age: 54
End: 2019-02-15

## 2019-02-25 ENCOUNTER — APPOINTMENT (OUTPATIENT)
Dept: LAB | Facility: HOSPITAL | Age: 54
End: 2019-02-25
Attending: INTERNAL MEDICINE
Payer: COMMERCIAL

## 2019-02-25 DIAGNOSIS — Z87.448 HISTORY OF LUPUS NEPHRITIS: ICD-10-CM

## 2019-02-25 LAB
ALBUMIN SERPL-MCNC: 3.8 G/DL (ref 3.4–5)
ANION GAP SERPL CALC-SCNC: 5 MMOL/L (ref 0–18)
BACTERIA UR QL AUTO: NEGATIVE /HPF
BILIRUB UR QL: NEGATIVE
BUN BLD-MCNC: 11 MG/DL (ref 7–18)
BUN/CREAT SERPL: 16.9 (ref 10–20)
CALCIUM BLD-MCNC: 8.8 MG/DL (ref 8.5–10.1)
CHLORIDE SERPL-SCNC: 111 MMOL/L (ref 98–107)
CLARITY UR: CLEAR
CO2 SERPL-SCNC: 29 MMOL/L (ref 21–32)
COLOR UR: YELLOW
CREAT BLD-MCNC: 0.65 MG/DL (ref 0.55–1.02)
CREAT UR-SCNC: 101 MG/DL
GLUCOSE BLD-MCNC: 102 MG/DL (ref 70–99)
GLUCOSE UR-MCNC: NEGATIVE MG/DL
KETONES UR-MCNC: NEGATIVE MG/DL
LEUKOCYTE ESTERASE UR QL STRIP.AUTO: NEGATIVE
MICROALBUMIN UR-MCNC: 6.4 MG/DL
MICROALBUMIN/CREAT 24H UR-RTO: 63.4 UG/MG (ref ?–30)
NITRITE UR QL STRIP.AUTO: NEGATIVE
OSMOLALITY SERPL CALC.SUM OF ELEC: 300 MOSM/KG (ref 275–295)
PH UR: 5 [PH] (ref 5–8)
PHOSPHATE SERPL-MCNC: 3.8 MG/DL (ref 2.5–4.9)
POTASSIUM SERPL-SCNC: 4 MMOL/L (ref 3.5–5.1)
PROT UR-MCNC: NEGATIVE MG/DL
RBC #/AREA URNS AUTO: 1 /HPF
SODIUM SERPL-SCNC: 145 MMOL/L (ref 136–145)
SP GR UR STRIP: 1.01 (ref 1–1.03)
UROBILINOGEN UR STRIP-ACNC: <2
VIT C UR-MCNC: NEGATIVE MG/DL
WBC #/AREA URNS AUTO: <1 /HPF

## 2019-02-25 PROCEDURE — 80069 RENAL FUNCTION PANEL: CPT

## 2019-02-25 PROCEDURE — 81001 URINALYSIS AUTO W/SCOPE: CPT

## 2019-02-25 PROCEDURE — 36415 COLL VENOUS BLD VENIPUNCTURE: CPT

## 2019-02-25 PROCEDURE — 82043 UR ALBUMIN QUANTITATIVE: CPT

## 2019-02-25 PROCEDURE — 82570 ASSAY OF URINE CREATININE: CPT

## 2019-02-28 ENCOUNTER — TELEPHONE (OUTPATIENT)
Dept: NEPHROLOGY | Facility: CLINIC | Age: 54
End: 2019-02-28

## 2019-02-28 NOTE — TELEPHONE ENCOUNTER
Contacted pt with . Notified her that kidneys still look good & to please see MKK in March for 1 year follow up. Scheduled appt for 3/22/19.

## 2019-03-22 ENCOUNTER — OFFICE VISIT (OUTPATIENT)
Dept: NEPHROLOGY | Facility: CLINIC | Age: 54
End: 2019-03-22
Payer: COMMERCIAL

## 2019-03-22 VITALS
BODY MASS INDEX: 27.04 KG/M2 | HEART RATE: 64 BPM | SYSTOLIC BLOOD PRESSURE: 97 MMHG | WEIGHT: 152.63 LBS | HEIGHT: 63 IN | DIASTOLIC BLOOD PRESSURE: 61 MMHG

## 2019-03-22 DIAGNOSIS — Z87.448 HISTORY OF LUPUS NEPHRITIS: Primary | ICD-10-CM

## 2019-03-22 PROCEDURE — 99214 OFFICE O/P EST MOD 30 MIN: CPT | Performed by: INTERNAL MEDICINE

## 2019-03-22 PROCEDURE — 99212 OFFICE O/P EST SF 10 MIN: CPT | Performed by: INTERNAL MEDICINE

## 2019-03-23 NOTE — PROGRESS NOTES
03/22/19        Patient: Jono Castaneda   YOB: 1965   Date of Visit: 3/22/2019       Dear  Dr. Adela Ahmadi MD,      Thank you for referring Jono Castaneda to my practice. Please find my assessment and plan below.       As you know she is a 53-y PEGGY  [facility]  [address] [place] [id number]    Document electronically generated by:  Edmond Lozano

## 2019-04-16 NOTE — TELEPHONE ENCOUNTER
Pt needs a refill on medication       Current Outpatient Medications:                    Hydroxychloroquine Sulfate 200 MG Oral Tab Take 1 tablet (200 mg total) by mouth 2 (two) times daily.  Disp: 180 tablet Rfl: 2

## 2019-04-17 RX ORDER — HYDROXYCHLOROQUINE SULFATE 200 MG/1
200 TABLET, FILM COATED ORAL 2 TIMES DAILY
Qty: 180 TABLET | Refills: 3 | Status: SHIPPED | OUTPATIENT
Start: 2019-04-17 | End: 2019-11-15

## 2019-04-17 NOTE — TELEPHONE ENCOUNTER
LOV: 11/2/2018 eye exam 11/1/18  Future Appointments   Date Time Provider Devin Roblero   5/15/2019  3:10 PM Kian Arreola MD 2014 [facility]     Labs: 10/25/18 WNL

## 2019-05-02 ENCOUNTER — APPOINTMENT (OUTPATIENT)
Dept: LAB | Facility: HOSPITAL | Age: 54
End: 2019-05-02
Attending: INTERNAL MEDICINE
Payer: COMMERCIAL

## 2019-05-02 DIAGNOSIS — Z51.81 THERAPEUTIC DRUG MONITORING: ICD-10-CM

## 2019-05-02 DIAGNOSIS — M32.9 SYSTEMIC LUPUS ERYTHEMATOSUS, UNSPECIFIED SLE TYPE, UNSPECIFIED ORGAN INVOLVEMENT STATUS (HCC): ICD-10-CM

## 2019-05-02 PROCEDURE — 36415 COLL VENOUS BLD VENIPUNCTURE: CPT

## 2019-05-02 PROCEDURE — 85027 COMPLETE CBC AUTOMATED: CPT

## 2019-05-02 PROCEDURE — 80053 COMPREHEN METABOLIC PANEL: CPT

## 2019-05-02 PROCEDURE — 85652 RBC SED RATE AUTOMATED: CPT

## 2019-05-02 PROCEDURE — 81003 URINALYSIS AUTO W/O SCOPE: CPT

## 2019-05-02 PROCEDURE — 86140 C-REACTIVE PROTEIN: CPT

## 2019-06-17 ENCOUNTER — OFFICE VISIT (OUTPATIENT)
Dept: RHEUMATOLOGY | Facility: CLINIC | Age: 54
End: 2019-06-17
Payer: COMMERCIAL

## 2019-06-17 VITALS
HEART RATE: 64 BPM | BODY MASS INDEX: 26.75 KG/M2 | SYSTOLIC BLOOD PRESSURE: 107 MMHG | HEIGHT: 63 IN | WEIGHT: 151 LBS | DIASTOLIC BLOOD PRESSURE: 71 MMHG

## 2019-06-17 DIAGNOSIS — M32.9 SYSTEMIC LUPUS ERYTHEMATOSUS, UNSPECIFIED SLE TYPE, UNSPECIFIED ORGAN INVOLVEMENT STATUS (HCC): Primary | ICD-10-CM

## 2019-06-17 DIAGNOSIS — Z51.81 THERAPEUTIC DRUG MONITORING: ICD-10-CM

## 2019-06-17 PROCEDURE — 99214 OFFICE O/P EST MOD 30 MIN: CPT | Performed by: INTERNAL MEDICINE

## 2019-06-17 PROCEDURE — 99212 OFFICE O/P EST SF 10 MIN: CPT | Performed by: INTERNAL MEDICINE

## 2019-06-17 NOTE — PATIENT INSTRUCTIONS
1..Cont. hydroxychlrqouine 200mg twice a day   2.. Tylenol arthirits 1-2 tablets a day -   3. Return to clinci in 6-9 months. 4. voltaren gel 1 % as needed  5. Check labs in 6-9 months. 6. Add advil or ibuprofen 200mg a day as needed for pains.    7. Tr

## 2019-06-17 NOTE — PROGRESS NOTES
Shae Gomez is a 47year old female. HPI:   Patient presents with: Follow - Up: SLE  Wrist Pain: bilateral  Ankle Pain: bilateral      I had the pleasure of seeing Mrs. Shae Gomez on 6/17/2019. I had last seen her on 11/12/2018.  . As you recall, Medications:  Hydroxychloroquine Sulfate 200 MG Oral Tab Take 1 tablet (200 mg total) by mouth 2 (two) times daily. Disp: 180 tablet Rfl: 3   PANTOPRAZOLE SODIUM 40 MG Oral Tab EC TAKE 1 TABLET (40 MG TOTAL) BY MOUTH EVERY MORNING BEFORE BREAKFAST.  Disp: 9 mOsm/kg 290   GFR, Non-      >=60 104   GFR, -American      >=60 120   ALT (SGPT)      13 - 56 U/L 21   AST (SGOT)      15 - 37 U/L 19   ALKALINE PHOSPHATASE      41 - 108 U/L 93   Total Bilirubin      0.1 - 2.0 mg/dL 0.7   TOTAL PRO 1. SLE (systemic lupus erythematosus) ([facility]  1.  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 hydroxychlrooquine 200mg bid  -   Asked her to ad

## 2019-10-08 ENCOUNTER — LAB ENCOUNTER (OUTPATIENT)
Dept: LAB | Facility: HOSPITAL | Age: 54
End: 2019-10-08
Attending: INTERNAL MEDICINE
Payer: COMMERCIAL

## 2019-10-08 DIAGNOSIS — Z87.448 HISTORY OF LUPUS NEPHRITIS: ICD-10-CM

## 2019-10-08 PROCEDURE — 82043 UR ALBUMIN QUANTITATIVE: CPT

## 2019-10-08 PROCEDURE — 85652 RBC SED RATE AUTOMATED: CPT

## 2019-10-08 PROCEDURE — 82570 ASSAY OF URINE CREATININE: CPT

## 2019-10-08 PROCEDURE — 36415 COLL VENOUS BLD VENIPUNCTURE: CPT

## 2019-10-08 PROCEDURE — 81001 URINALYSIS AUTO W/SCOPE: CPT

## 2019-10-08 PROCEDURE — 80069 RENAL FUNCTION PANEL: CPT

## 2019-10-08 PROCEDURE — 86140 C-REACTIVE PROTEIN: CPT

## 2019-10-08 PROCEDURE — 85025 COMPLETE CBC W/AUTO DIFF WBC: CPT

## 2019-10-09 ENCOUNTER — TELEPHONE (OUTPATIENT)
Dept: NEPHROLOGY | Facility: CLINIC | Age: 54
End: 2019-10-09

## 2019-10-09 DIAGNOSIS — Z87.448 PERSONAL HISTORY OF URINARY DISORDER: Primary | ICD-10-CM

## 2019-10-09 NOTE — TELEPHONE ENCOUNTER
Kidney function remains normal.  Other labs were good. No evidence for active lupus kidney disease. Repeat same labs in 6 months and then see for follow-up.

## 2019-10-09 NOTE — TELEPHONE ENCOUNTER
Call conducted with Sasha Hammer   ID [id number]. Patient contacted (Name and  of pt verified). All results and recommendations reviewed. Patient verbalizes understanding, denies further questions and agrees with plan of care. Booked appt in 2020.  [employer]

## 2019-10-30 ENCOUNTER — OFFICE VISIT (OUTPATIENT)
Dept: FAMILY MEDICINE CLINIC | Facility: CLINIC | Age: 54
End: 2019-10-30
Payer: COMMERCIAL

## 2019-10-30 ENCOUNTER — APPOINTMENT (OUTPATIENT)
Dept: LAB | Age: 54
End: 2019-10-30
Attending: FAMILY MEDICINE
Payer: COMMERCIAL

## 2019-10-30 VITALS
TEMPERATURE: 98 F | WEIGHT: 148 LBS | BODY MASS INDEX: 26.22 KG/M2 | SYSTOLIC BLOOD PRESSURE: 95 MMHG | HEART RATE: 60 BPM | DIASTOLIC BLOOD PRESSURE: 57 MMHG | HEIGHT: 63 IN

## 2019-10-30 DIAGNOSIS — Z00.00 ANNUAL PHYSICAL EXAM: ICD-10-CM

## 2019-10-30 DIAGNOSIS — Z00.00 ANNUAL PHYSICAL EXAM: Primary | ICD-10-CM

## 2019-10-30 PROCEDURE — 80061 LIPID PANEL: CPT

## 2019-10-30 PROCEDURE — 99396 PREV VISIT EST AGE 40-64: CPT | Performed by: FAMILY MEDICINE

## 2019-10-30 PROCEDURE — 36415 COLL VENOUS BLD VENIPUNCTURE: CPT

## 2019-10-30 PROCEDURE — 83036 HEMOGLOBIN GLYCOSYLATED A1C: CPT

## 2019-10-30 RX ORDER — AZITHROMYCIN 250 MG/1
TABLET, FILM COATED ORAL
Qty: 6 TABLET | Refills: 0 | Status: SHIPPED | OUTPATIENT
Start: 2019-10-30 | End: 2019-11-15

## 2019-10-30 RX ORDER — BENZONATATE 200 MG/1
200 CAPSULE ORAL 3 TIMES DAILY PRN
Qty: 30 CAPSULE | Refills: 0 | Status: SHIPPED | OUTPATIENT
Start: 2019-10-30 | End: 2019-11-15

## 2019-11-05 ENCOUNTER — OFFICE VISIT (OUTPATIENT)
Dept: OPHTHALMOLOGY | Facility: CLINIC | Age: 54
End: 2019-11-05
Payer: COMMERCIAL

## 2019-11-05 DIAGNOSIS — H04.123 CHRONIC DRYNESS OF BOTH EYES: ICD-10-CM

## 2019-11-05 DIAGNOSIS — Z79.899 LONG-TERM USE OF PLAQUENIL: Primary | ICD-10-CM

## 2019-11-05 DIAGNOSIS — H25.13 AGE-RELATED NUCLEAR CATARACT OF BOTH EYES: ICD-10-CM

## 2019-11-05 PROCEDURE — 92015 DETERMINE REFRACTIVE STATE: CPT | Performed by: OPHTHALMOLOGY

## 2019-11-05 PROCEDURE — 92014 COMPRE OPH EXAM EST PT 1/>: CPT | Performed by: OPHTHALMOLOGY

## 2019-11-05 NOTE — ASSESSMENT & PLAN NOTE
Discussed early cataracts with patient. No treatment recommended at this time. New glasses; suggest update.

## 2019-11-05 NOTE — ASSESSMENT & PLAN NOTE
Diagnosis discussed with patient. Use artificial tears (any over the counter brand is okay) up to 4 times per day as needed for dry eye symptoms.

## 2019-11-05 NOTE — PROGRESS NOTES
Alvaro Valenzuela is a 47year old female. HPI:     HPI     Patient is here for a Plaquenil EE. She  was Dx with Lupus in 2008; pt is taking 200mg twice a day. She complains of blurry vision at distance and near. She complains of dry eye OU.   She is using Female    • No Known Problems Paternal Cousin Male    • No Known Problems Other    • Glaucoma Neg    • Macular degeneration Neg    • Breast Cancer Neg    • Ovarian Cancer Neg    • DCIS Neg    • LCIS Neg    • BRCA gene + Neg    • Ashkenazi Latter-day Descent Ne PM            Additional Tests     Amsler       Right Left     Normal Normal          Color       Right Left    Ishihara 5/5 5/5            Slit Lamp and Fundus Exam     Slit Lamp Exam       Right Left    Lids/Lashes Dermatochalasis, Meibomian gland dysfun symptoms. No orders of the defined types were placed in this encounter.       Meds This Visit:  Requested Prescriptions      No prescriptions requested or ordered in this encounter        Follow up instructions:  Return in about 1 year (around

## 2019-11-08 ENCOUNTER — TELEPHONE (OUTPATIENT)
Dept: FAMILY MEDICINE CLINIC | Facility: CLINIC | Age: 54
End: 2019-11-08

## 2019-11-08 NOTE — TELEPHONE ENCOUNTER
Per patient on 10/30 she was not fasting and states that could have affected her lab results. Requesting to re-do labs. Please advise. Thank you.

## 2019-11-11 NOTE — TELEPHONE ENCOUNTER
Dr. Adela Ahmadi, patient states lipids and Hgb A1C were completed non fasting. Patient is requesting new orders. Please advise.      Please reply to pool: EM TRIAGE SUPPORT

## 2019-11-11 NOTE — PROGRESS NOTES
HPI:    Patient ID: Ca Ravi is a 47year old female. Patient here for annual physical.   cmplaining about cough also productive longer then 7 days. Review of Systems   Constitutional: Positive for fatigue.  Negative for appetite change, chil respiratory distress. She has no wheezes. She has no rales. She exhibits no tenderness.    Prolonged exp phase              ASSESSMENT/PLAN:   Annual physical exam  (primary encounter diagnosis)  Upper respiratory inffection z pack benzotate  Orders Placed

## 2019-11-13 NOTE — TELEPHONE ENCOUNTER
Lipids are good so does not need new order  HbA1C is not affected by fasting so does not need new order.    Please call the patient

## 2019-11-13 NOTE — TELEPHONE ENCOUNTER
Patient verbalized understanding of Dr. Sim Hopping message. Patient was calling regarding testing ordered by Dr. Era Chirinos. Patient was under the impression that those test would have needed to be done fasting.  Informed patient of those specific testing bein

## 2019-11-15 ENCOUNTER — OFFICE VISIT (OUTPATIENT)
Dept: OBGYN CLINIC | Facility: CLINIC | Age: 54
End: 2019-11-15
Payer: COMMERCIAL

## 2019-11-15 VITALS
SYSTOLIC BLOOD PRESSURE: 116 MMHG | BODY MASS INDEX: 26.58 KG/M2 | HEIGHT: 63 IN | DIASTOLIC BLOOD PRESSURE: 70 MMHG | WEIGHT: 150 LBS

## 2019-11-15 DIAGNOSIS — Z01.419 WELL WOMAN EXAM WITH ROUTINE GYNECOLOGICAL EXAM: Primary | ICD-10-CM

## 2019-11-15 PROCEDURE — 99396 PREV VISIT EST AGE 40-64: CPT | Performed by: OBSTETRICS & GYNECOLOGY

## 2019-11-15 NOTE — PROGRESS NOTES
HPI:    Patient ID: Dub Stanislaw is a 47year old year old female. HPI  Well woman visit  History of hysterectomy. No specific complaints. Denies breast, pelvic, vaginal urinary or bowel complaints.   Has had loose stools and was evaluated by GI and Father         per ng prostatitis   • No Known Problems Mother    • Diabetes Sister    • Diabetes Brother    • No Known Problems Son    • No Known Problems Maternal Grandmother    • No Known Problems Maternal Grandfather    • No Known Problems Paternal Gra file        Active member of club or organization: Not on file        Attends meetings of clubs or organizations: Not on file        Relationship status: Not on file      Intimate partner violence:        Fear of current or ex partner: Not on file        E Abdominal: Soft. Normal appearance and bowel sounds are normal. She exhibits no mass. There is no hepatosplenomegaly. There is no tenderness. There is no rebound and no CVA tenderness. No hernia.  Hernia negative in the ventral area,  negative in the righ Standing Status: Future          Standing Expiration Date: 11/15/2020          Order Specific Question: HPV Genotyping Request (if HPV positive): Answer:  If PAP neg [3]      PANTOPRAZOLE SODIUM 40 MG Oral Tab EC, TAKE 1 TABLET (40 MG TOTAL) BY TERI

## 2019-12-03 ENCOUNTER — HOSPITAL ENCOUNTER (OUTPATIENT)
Dept: MAMMOGRAPHY | Facility: HOSPITAL | Age: 54
Discharge: HOME | End: 2019-12-03
Attending: FAMILY MEDICINE
Payer: COMMERCIAL

## 2019-12-03 DIAGNOSIS — Z00.00 ANNUAL PHYSICAL EXAM: ICD-10-CM

## 2019-12-03 PROCEDURE — 77063 BREAST TOMOSYNTHESIS BI: CPT | Performed by: FAMILY MEDICINE

## 2019-12-03 PROCEDURE — 77067 SCR MAMMO BI INCL CAD: CPT | Performed by: FAMILY MEDICINE

## 2020-01-07 ENCOUNTER — APPOINTMENT (OUTPATIENT)
Dept: LAB | Facility: HOSPITAL | Age: 55
End: 2020-01-07
Attending: INTERNAL MEDICINE
Payer: COMMERCIAL

## 2020-01-07 DIAGNOSIS — M32.9 SYSTEMIC LUPUS ERYTHEMATOSUS, UNSPECIFIED SLE TYPE, UNSPECIFIED ORGAN INVOLVEMENT STATUS (HCC): ICD-10-CM

## 2020-01-07 DIAGNOSIS — Z51.81 THERAPEUTIC DRUG MONITORING: ICD-10-CM

## 2020-01-07 LAB
ALT SERPL-CCNC: 18 U/L (ref 13–56)
AST SERPL-CCNC: 21 U/L (ref 15–37)
BACTERIA UR QL AUTO: NEGATIVE /HPF
BILIRUB UR QL: NEGATIVE
CLARITY UR: CLEAR
COLOR UR: YELLOW
CREAT BLD-MCNC: 0.65 MG/DL (ref 0.55–1.02)
CRP SERPL-MCNC: <0.29 MG/DL (ref ?–0.3)
DEPRECATED RDW RBC AUTO: 47.7 FL (ref 35.1–46.3)
ERYTHROCYTE [DISTWIDTH] IN BLOOD BY AUTOMATED COUNT: 13.7 % (ref 11–15)
ERYTHROCYTE [SEDIMENTATION RATE] IN BLOOD: 12 MM/HR (ref 0–30)
GLUCOSE UR-MCNC: NEGATIVE MG/DL
HCT VFR BLD AUTO: 40.7 % (ref 35–48)
HGB BLD-MCNC: 13 G/DL (ref 12–16)
KETONES UR-MCNC: NEGATIVE MG/DL
LEUKOCYTE ESTERASE UR QL STRIP.AUTO: NEGATIVE
MCH RBC QN AUTO: 30 PG (ref 26–34)
MCHC RBC AUTO-ENTMCNC: 31.9 G/DL (ref 31–37)
MCV RBC AUTO: 94 FL (ref 80–100)
NITRITE UR QL STRIP.AUTO: NEGATIVE
PH UR: 6 [PH] (ref 5–8)
PLATELET # BLD AUTO: 176 10(3)UL (ref 150–450)
PROT UR-MCNC: 30 MG/DL
RBC # BLD AUTO: 4.33 X10(6)UL (ref 3.8–5.3)
RBC #/AREA URNS AUTO: 2 /HPF
SP GR UR STRIP: 1.02 (ref 1–1.03)
UROBILINOGEN UR STRIP-ACNC: <2
WBC # BLD AUTO: 3.4 X10(3) UL (ref 4–11)
WBC #/AREA URNS AUTO: 1 /HPF

## 2020-01-07 PROCEDURE — 85027 COMPLETE CBC AUTOMATED: CPT

## 2020-01-07 PROCEDURE — 86225 DNA ANTIBODY NATIVE: CPT

## 2020-01-07 PROCEDURE — 81001 URINALYSIS AUTO W/SCOPE: CPT

## 2020-01-07 PROCEDURE — 84460 ALANINE AMINO (ALT) (SGPT): CPT

## 2020-01-07 PROCEDURE — 36415 COLL VENOUS BLD VENIPUNCTURE: CPT

## 2020-01-07 PROCEDURE — 84450 TRANSFERASE (AST) (SGOT): CPT

## 2020-01-07 PROCEDURE — 82565 ASSAY OF CREATININE: CPT

## 2020-01-07 PROCEDURE — 86140 C-REACTIVE PROTEIN: CPT

## 2020-01-07 PROCEDURE — 85652 RBC SED RATE AUTOMATED: CPT

## 2020-01-10 LAB — DSDNA AB TITR SER: <10 {TITER}

## 2020-01-19 RX ORDER — PANTOPRAZOLE SODIUM 40 MG/1
40 TABLET, DELAYED RELEASE ORAL
Qty: 90 TABLET | Refills: 1 | Status: SHIPPED | OUTPATIENT
Start: 2020-01-19 | End: 2021-02-02

## 2020-01-19 NOTE — TELEPHONE ENCOUNTER
Refill passed per [facility], [facility] protocol.   Refill Protocol Appointment Criteria  · Appointment scheduled in the past 12 months or in the next 3 months  Recent Outpatient Visits            2 months ago Well woman exam with routine gynecological exam    El

## 2020-03-16 ENCOUNTER — OFFICE VISIT (OUTPATIENT)
Dept: RHEUMATOLOGY | Facility: CLINIC | Age: 55
End: 2020-03-16
Payer: COMMERCIAL

## 2020-03-16 ENCOUNTER — LAB ENCOUNTER (OUTPATIENT)
Dept: LAB | Facility: HOSPITAL | Age: 55
End: 2020-03-16
Attending: INTERNAL MEDICINE
Payer: COMMERCIAL

## 2020-03-16 VITALS
SYSTOLIC BLOOD PRESSURE: 95 MMHG | BODY MASS INDEX: 26.05 KG/M2 | HEIGHT: 63 IN | DIASTOLIC BLOOD PRESSURE: 59 MMHG | HEART RATE: 72 BPM | RESPIRATION RATE: 16 BRPM | WEIGHT: 147 LBS

## 2020-03-16 DIAGNOSIS — D72.819 LEUKOPENIA, UNSPECIFIED TYPE: ICD-10-CM

## 2020-03-16 DIAGNOSIS — Z51.81 THERAPEUTIC DRUG MONITORING: ICD-10-CM

## 2020-03-16 DIAGNOSIS — M32.9 SYSTEMIC LUPUS ERYTHEMATOSUS, UNSPECIFIED SLE TYPE, UNSPECIFIED ORGAN INVOLVEMENT STATUS (HCC): ICD-10-CM

## 2020-03-16 DIAGNOSIS — R07.89 CHEST DISCOMFORT: ICD-10-CM

## 2020-03-16 DIAGNOSIS — M32.9 SYSTEMIC LUPUS ERYTHEMATOSUS, UNSPECIFIED SLE TYPE, UNSPECIFIED ORGAN INVOLVEMENT STATUS (HCC): Primary | ICD-10-CM

## 2020-03-16 LAB
BASOPHILS # BLD AUTO: 0.02 X10(3) UL (ref 0–0.2)
BASOPHILS NFR BLD AUTO: 0.4 %
CRP SERPL-MCNC: <0.29 MG/DL (ref ?–0.3)
DEPRECATED RDW RBC AUTO: 44.8 FL (ref 35.1–46.3)
EOSINOPHIL # BLD AUTO: 0.07 X10(3) UL (ref 0–0.7)
EOSINOPHIL NFR BLD AUTO: 1.5 %
ERYTHROCYTE [DISTWIDTH] IN BLOOD BY AUTOMATED COUNT: 13.2 % (ref 11–15)
ERYTHROCYTE [SEDIMENTATION RATE] IN BLOOD: 12 MM/HR (ref 0–30)
HCT VFR BLD AUTO: 38.3 % (ref 35–48)
HGB BLD-MCNC: 12.3 G/DL (ref 12–16)
IMM GRANULOCYTES # BLD AUTO: 0 X10(3) UL (ref 0–1)
IMM GRANULOCYTES NFR BLD: 0 %
LYMPHOCYTES # BLD AUTO: 1.5 X10(3) UL (ref 1–4)
LYMPHOCYTES NFR BLD AUTO: 32 %
MCH RBC QN AUTO: 29.4 PG (ref 26–34)
MCHC RBC AUTO-ENTMCNC: 32.1 G/DL (ref 31–37)
MCV RBC AUTO: 91.6 FL (ref 80–100)
MONOCYTES # BLD AUTO: 0.34 X10(3) UL (ref 0.1–1)
MONOCYTES NFR BLD AUTO: 7.2 %
NEUTROPHILS # BLD AUTO: 2.76 X10 (3) UL (ref 1.5–7.7)
NEUTROPHILS # BLD AUTO: 2.76 X10(3) UL (ref 1.5–7.7)
NEUTROPHILS NFR BLD AUTO: 58.9 %
PLATELET # BLD AUTO: 185 10(3)UL (ref 150–450)
RBC # BLD AUTO: 4.18 X10(6)UL (ref 3.8–5.3)
WBC # BLD AUTO: 4.7 X10(3) UL (ref 4–11)

## 2020-03-16 PROCEDURE — 85025 COMPLETE CBC W/AUTO DIFF WBC: CPT

## 2020-03-16 PROCEDURE — 36415 COLL VENOUS BLD VENIPUNCTURE: CPT

## 2020-03-16 PROCEDURE — 85652 RBC SED RATE AUTOMATED: CPT

## 2020-03-16 PROCEDURE — 99214 OFFICE O/P EST MOD 30 MIN: CPT | Performed by: INTERNAL MEDICINE

## 2020-03-16 PROCEDURE — 86140 C-REACTIVE PROTEIN: CPT

## 2020-03-16 RX ORDER — HYDROXYCHLOROQUINE SULFATE 200 MG/1
200 TABLET, FILM COATED ORAL 2 TIMES DAILY
COMMUNITY
Start: 2020-01-24 | End: 2020-03-16

## 2020-03-16 RX ORDER — HYDROXYCHLOROQUINE SULFATE 200 MG/1
200 TABLET, FILM COATED ORAL 2 TIMES DAILY
Qty: 180 TABLET | Refills: 1 | Status: SHIPPED | OUTPATIENT
Start: 2020-03-16 | End: 2020-11-05

## 2020-03-16 NOTE — PROGRESS NOTES
Soledad Tobias is a 49year old female. HPI:   Patient presents with:  SLE  Lab Results  Back Pain: lower, waist      I had the pleasure of seeing Mrs. Soledad Tobias on 3/16/2020. As you recall, she is a pleasant 49 year old who has a hx of SLE.   She' 1/29/2013    per NG   • Long-term use of Plaquenil 2013,2014    per ng w/ no toxicity   • Lupus ([facility] [address] 2008    per ng sistemic lupus erythematous ; prednison & plaquenil; lupus nephritis nephrotic range proteinuria   • Meibomian gland dysfunction 2013   • S Negative   Ketones, UA      Negative mg/dL Negative   Blood Urine      Negative Small (A)   PH Urine      5.0 - 8.0 6.0   Protein Urine      Negative mg/dL 30 (A)   Urobilinogen Urine      <2.0 <2.0   Nitrite Urine      Negative Negative   Leukocyte rBenden Lira dayo -has seen dr. Dee Carey in the past   - IBUPROFEN 600mg FOR PAIN PRN or tyelnol arthritis 1-2 tablts fro apin - - she will call if pain increases, she doesn't want anything extra at this time. Labs look good.    - headache is 2 days a week - mayo

## 2020-03-16 NOTE — PATIENT INSTRUCTIONS
1..Cont. hydroxychlrqouine 200mg twice a day   2.. Tylenol arthirits 1-2 tablets a day -   3. Return to cl[facility]i in 6-9 months. 4. voltaren gel 1 % as needed  5. Check labs today  6. Add advil or ibuprofen 200mg a day as needed for pains.    7. Try turmeric

## 2020-03-17 ENCOUNTER — TELEPHONE (OUTPATIENT)
Dept: RHEUMATOLOGY | Facility: CLINIC | Age: 55
End: 2020-03-17

## 2020-03-17 NOTE — TELEPHONE ENCOUNTER
Letter of results was mail to patient today from Dr. Azul Peterson. Patient mychart account is not active.

## 2020-03-27 ENCOUNTER — TELEPHONE (OUTPATIENT)
Dept: RHEUMATOLOGY | Facility: CLINIC | Age: 55
End: 2020-03-27

## 2020-03-27 NOTE — TELEPHONE ENCOUNTER
Narinder Le from Echo Dept here at [facility] would like to know if they can postpone patient's echo appointment. Please advise. Thank you.

## 2020-03-27 NOTE — TELEPHONE ENCOUNTER
Dr Fletcher Arango - talked to [facility] from Echo states pt has appt for April 13 can it be rescheduled?

## 2020-03-28 NOTE — TELEPHONE ENCOUNTER
Spoke with Ethan Ratliff in Echo Dept today and informed per Dr. Seun Ramirez is okay for patient to postpone Echo test of April 13. Jackeline verbalized understanding and will notify Taylorfreida Rowley.

## 2020-04-13 ENCOUNTER — LAB ENCOUNTER (OUTPATIENT)
Dept: LAB | Facility: HOSPITAL | Age: 55
End: 2020-04-13
Attending: INTERNAL MEDICINE
Payer: COMMERCIAL

## 2020-04-13 DIAGNOSIS — Z87.448 PERSONAL HISTORY OF URINARY DISORDER: ICD-10-CM

## 2020-04-13 PROCEDURE — 85025 COMPLETE CBC W/AUTO DIFF WBC: CPT

## 2020-04-13 PROCEDURE — 82043 UR ALBUMIN QUANTITATIVE: CPT

## 2020-04-13 PROCEDURE — 36415 COLL VENOUS BLD VENIPUNCTURE: CPT

## 2020-04-13 PROCEDURE — 82570 ASSAY OF URINE CREATININE: CPT

## 2020-04-13 PROCEDURE — 80069 RENAL FUNCTION PANEL: CPT

## 2020-04-13 PROCEDURE — 85652 RBC SED RATE AUTOMATED: CPT

## 2020-04-13 PROCEDURE — 86140 C-REACTIVE PROTEIN: CPT

## 2020-04-13 PROCEDURE — 81001 URINALYSIS AUTO W/SCOPE: CPT

## 2020-04-14 ENCOUNTER — TELEPHONE (OUTPATIENT)
Dept: NEPHROLOGY | Facility: CLINIC | Age: 55
End: 2020-04-14

## 2020-04-14 NOTE — TELEPHONE ENCOUNTER
Patient completed labs on 4/13/20, patient calling for results. Patient declined TeleHealth visit and rescheduled for 5/15/20, patient asking if new labs need to be completed for rescheduled appointment. Please call at:[phone],thanks.

## 2020-04-15 NOTE — TELEPHONE ENCOUNTER
Tell her labs all look good. No evidence for lupus affecting her kidneys. If feeling well does not need to do follow-up labs before May visit.

## 2020-04-15 NOTE — TELEPHONE ENCOUNTER
Pt returned call. Notified her of Dr. Deborah Beaver message below. She stated understanding and will keep appt for 5/15/20.

## 2020-05-12 ENCOUNTER — TELEPHONE (OUTPATIENT)
Dept: NEPHROLOGY | Facility: CLINIC | Age: 55
End: 2020-05-12

## 2020-05-15 ENCOUNTER — OFFICE VISIT (OUTPATIENT)
Dept: NEPHROLOGY | Facility: CLINIC | Age: 55
End: 2020-05-15
Payer: COMMERCIAL

## 2020-05-15 VITALS
WEIGHT: 149.81 LBS | SYSTOLIC BLOOD PRESSURE: 106 MMHG | DIASTOLIC BLOOD PRESSURE: 64 MMHG | HEART RATE: 62 BPM | BODY MASS INDEX: 26.54 KG/M2 | HEIGHT: 63 IN

## 2020-05-15 DIAGNOSIS — Z87.448 HISTORY OF LUPUS NEPHRITIS: Primary | ICD-10-CM

## 2020-05-15 PROCEDURE — 99214 OFFICE O/P EST MOD 30 MIN: CPT | Performed by: INTERNAL MEDICINE

## 2020-05-16 NOTE — PROGRESS NOTES
05/15/20        Patient: Kathrin Chapman   YOB: 1965   Date of Visit: 5/15/2020       Dear  Dr. Dion Blanco MD,      Thank you for referring Kathrin Chapman to my practice. Please find my assessment and plan below.       As you know she is a 54-y

## 2020-06-02 ENCOUNTER — HOSPITAL ENCOUNTER (OUTPATIENT)
Dept: CV DIAGNOSTICS | Facility: HOSPITAL | Age: 55
Discharge: HOME | End: 2020-06-02
Attending: INTERNAL MEDICINE
Payer: COMMERCIAL

## 2020-06-02 DIAGNOSIS — R07.89 CHEST DISCOMFORT: ICD-10-CM

## 2020-06-02 DIAGNOSIS — M32.9 SYSTEMIC LUPUS ERYTHEMATOSUS, UNSPECIFIED SLE TYPE, UNSPECIFIED ORGAN INVOLVEMENT STATUS (HCC): ICD-10-CM

## 2020-06-02 PROCEDURE — 93306 TTE W/DOPPLER COMPLETE: CPT | Performed by: INTERNAL MEDICINE

## 2020-06-05 ENCOUNTER — TELEPHONE (OUTPATIENT)
Dept: RHEUMATOLOGY | Facility: CLINIC | Age: 55
End: 2020-06-05

## 2020-06-05 NOTE — TELEPHONE ENCOUNTER
Contacted pt,  verified, pt was informed echo results are normal. She verbalized understanding with no additional questions. Report was mailed to address on file. Pt has f/u appt scheduled for .

## 2020-08-07 ENCOUNTER — LAB ENCOUNTER (OUTPATIENT)
Dept: LAB | Facility: HOSPITAL | Age: 55
End: 2020-08-07
Attending: INTERNAL MEDICINE
Payer: COMMERCIAL

## 2020-08-07 DIAGNOSIS — Z87.448 HISTORY OF LUPUS NEPHRITIS: ICD-10-CM

## 2020-08-07 DIAGNOSIS — M32.19 SYSTEMIC LUPUS ERYTHEMATOSUS WITH OTHER ORGAN INVOLVEMENT, UNSPECIFIED SLE TYPE (HCC): Primary | ICD-10-CM

## 2020-08-07 LAB
ALBUMIN SERPL-MCNC: 3.6 G/DL (ref 3.4–5)
ANION GAP SERPL CALC-SCNC: 4 MMOL/L (ref 0–18)
BACTERIA UR QL AUTO: NEGATIVE /HPF
BASOPHILS # BLD AUTO: 0.02 X10(3) UL (ref 0–0.2)
BASOPHILS NFR BLD AUTO: 0.6 %
BILIRUB UR QL: NEGATIVE
BUN BLD-MCNC: 9 MG/DL (ref 7–18)
BUN/CREAT SERPL: 15.3 (ref 10–20)
CALCIUM BLD-MCNC: 9.2 MG/DL (ref 8.5–10.1)
CHLORIDE SERPL-SCNC: 108 MMOL/L (ref 98–112)
CLARITY UR: CLEAR
CO2 SERPL-SCNC: 30 MMOL/L (ref 21–32)
COLOR UR: YELLOW
CREAT BLD-MCNC: 0.59 MG/DL (ref 0.55–1.02)
CREAT UR-SCNC: 153 MG/DL
CRP SERPL-MCNC: <0.29 MG/DL (ref ?–0.3)
DEPRECATED RDW RBC AUTO: 45.4 FL (ref 35.1–46.3)
EOSINOPHIL # BLD AUTO: 0.08 X10(3) UL (ref 0–0.7)
EOSINOPHIL NFR BLD AUTO: 2.6 %
ERYTHROCYTE [DISTWIDTH] IN BLOOD BY AUTOMATED COUNT: 13.3 % (ref 11–15)
ERYTHROCYTE [SEDIMENTATION RATE] IN BLOOD: 8 MM/HR (ref 0–30)
GLUCOSE BLD-MCNC: 80 MG/DL (ref 70–99)
GLUCOSE UR-MCNC: NEGATIVE MG/DL
HCT VFR BLD AUTO: 41.1 % (ref 35–48)
HGB BLD-MCNC: 13.3 G/DL (ref 12–16)
IMM GRANULOCYTES # BLD AUTO: 0.01 X10(3) UL (ref 0–1)
IMM GRANULOCYTES NFR BLD: 0.3 %
KETONES UR-MCNC: NEGATIVE MG/DL
LEUKOCYTE ESTERASE UR QL STRIP.AUTO: NEGATIVE
LYMPHOCYTES # BLD AUTO: 1.36 X10(3) UL (ref 1–4)
LYMPHOCYTES NFR BLD AUTO: 43.7 %
MCH RBC QN AUTO: 30 PG (ref 26–34)
MCHC RBC AUTO-ENTMCNC: 32.4 G/DL (ref 31–37)
MCV RBC AUTO: 92.6 FL (ref 80–100)
MICROALBUMIN UR-MCNC: 4.92 MG/DL
MICROALBUMIN/CREAT 24H UR-RTO: 32.2 UG/MG (ref ?–30)
MONOCYTES # BLD AUTO: 0.24 X10(3) UL (ref 0.1–1)
MONOCYTES NFR BLD AUTO: 7.7 %
NEUTROPHILS # BLD AUTO: 1.4 X10 (3) UL (ref 1.5–7.7)
NEUTROPHILS # BLD AUTO: 1.4 X10(3) UL (ref 1.5–7.7)
NEUTROPHILS NFR BLD AUTO: 45.1 %
NITRITE UR QL STRIP.AUTO: NEGATIVE
OSMOLALITY SERPL CALC.SUM OF ELEC: 292 MOSM/KG (ref 275–295)
PH UR: 7 [PH] (ref 5–8)
PHOSPHATE SERPL-MCNC: 3.9 MG/DL (ref 2.5–4.9)
PLATELET # BLD AUTO: 164 10(3)UL (ref 150–450)
POTASSIUM SERPL-SCNC: 3.9 MMOL/L (ref 3.5–5.1)
PROT UR-MCNC: NEGATIVE MG/DL
RBC # BLD AUTO: 4.44 X10(6)UL (ref 3.8–5.3)
RBC #/AREA URNS AUTO: 2 /HPF
SODIUM SERPL-SCNC: 142 MMOL/L (ref 136–145)
SP GR UR STRIP: 1.02 (ref 1–1.03)
UROBILINOGEN UR STRIP-ACNC: <2
WBC # BLD AUTO: 3.1 X10(3) UL (ref 4–11)
WBC #/AREA URNS AUTO: 1 /HPF

## 2020-08-07 PROCEDURE — 36415 COLL VENOUS BLD VENIPUNCTURE: CPT

## 2020-08-07 PROCEDURE — 81001 URINALYSIS AUTO W/SCOPE: CPT

## 2020-08-07 PROCEDURE — 85652 RBC SED RATE AUTOMATED: CPT

## 2020-08-07 PROCEDURE — 86140 C-REACTIVE PROTEIN: CPT

## 2020-08-07 PROCEDURE — 82570 ASSAY OF URINE CREATININE: CPT

## 2020-08-07 PROCEDURE — 82043 UR ALBUMIN QUANTITATIVE: CPT

## 2020-08-07 PROCEDURE — 80069 RENAL FUNCTION PANEL: CPT

## 2020-08-07 PROCEDURE — 85025 COMPLETE CBC W/AUTO DIFF WBC: CPT

## 2020-08-08 ENCOUNTER — TELEPHONE (OUTPATIENT)
Dept: NEPHROLOGY | Facility: CLINIC | Age: 55
End: 2020-08-08

## 2020-08-08 NOTE — TELEPHONE ENCOUNTER
Labs remain very good. Kidney function remains normal.  No evidence for active lupus. As long she is feeling well just repeat labs in 6 months.

## 2020-11-04 NOTE — TELEPHONE ENCOUNTER
Last filled: 3/16/2020 #180 tab with 1 refill  LOV: 3/16/2020   Eye exam:  11/5/19  Future Appointments   Date Time Provider Devin Roblero   11/17/2020 11:15 AM [facility]   12/14/2020  3:30 PM Castro Pereira MD [facility]

## 2020-11-05 RX ORDER — HYDROXYCHLOROQUINE SULFATE 200 MG/1
TABLET, FILM COATED ORAL
Qty: 180 TABLET | Refills: 1 | Status: SHIPPED | OUTPATIENT
Start: 2020-11-05 | End: 2021-04-22

## 2020-11-06 ENCOUNTER — TELEPHONE (OUTPATIENT)
Dept: FAMILY MEDICINE CLINIC | Facility: CLINIC | Age: 55
End: 2020-11-06

## 2020-11-06 NOTE — TELEPHONE ENCOUNTER
Pt faxed over [address] forms for [employer]/ Non-FMLA Medical Leave of Absence. Payment has not been made.

## 2020-11-09 NOTE — TELEPHONE ENCOUNTER
Dr. Diya Millan,    Pt is requesting intermittent FMLA due to lupus. Pt is requesting 1-4 flare ups per month, each episode lasting 1-24 hrs and 1-2 appts per month for the duration on 6 months. Do you support?     Thank you,  Luzmaria Gonzáles

## 2020-11-10 ENCOUNTER — LAB ENCOUNTER (OUTPATIENT)
Dept: LAB | Age: 55
End: 2020-11-10
Attending: PREVENTIVE MEDICINE

## 2020-11-10 ENCOUNTER — TELEPHONE (OUTPATIENT)
Dept: INTERNAL MEDICINE CLINIC | Facility: HOSPITAL | Age: 55
End: 2020-11-10

## 2020-11-10 DIAGNOSIS — Z20.822 SUSPECTED COVID-19 VIRUS INFECTION: Primary | ICD-10-CM

## 2020-11-10 DIAGNOSIS — Z20.822 SUSPECTED COVID-19 VIRUS INFECTION: ICD-10-CM

## 2020-11-12 ENCOUNTER — NURSE TRIAGE (OUTPATIENT)
Dept: FAMILY MEDICINE CLINIC | Facility: CLINIC | Age: 55
End: 2020-11-12

## 2020-11-12 ENCOUNTER — APPOINTMENT (OUTPATIENT)
Dept: GENERAL RADIOLOGY | Facility: HOSPITAL | Age: 55
End: 2020-11-12
Attending: EMERGENCY MEDICINE
Payer: COMMERCIAL

## 2020-11-12 ENCOUNTER — HOSPITAL ENCOUNTER (EMERGENCY)
Facility: HOSPITAL | Age: 55
Discharge: HOME | End: 2020-11-12
Attending: EMERGENCY MEDICINE
Payer: COMMERCIAL

## 2020-11-12 VITALS
RESPIRATION RATE: 20 BRPM | DIASTOLIC BLOOD PRESSURE: 76 MMHG | OXYGEN SATURATION: 98 % | HEART RATE: 79 BPM | TEMPERATURE: 98 F | SYSTOLIC BLOOD PRESSURE: 134 MMHG

## 2020-11-12 DIAGNOSIS — J06.9 UPPER RESPIRATORY TRACT INFECTION, UNSPECIFIED TYPE: Primary | ICD-10-CM

## 2020-11-12 PROCEDURE — 87430 STREP A AG IA: CPT

## 2020-11-12 PROCEDURE — 36415 COLL VENOUS BLD VENIPUNCTURE: CPT

## 2020-11-12 PROCEDURE — 93005 ELECTROCARDIOGRAM TRACING: CPT

## 2020-11-12 PROCEDURE — 93010 ELECTROCARDIOGRAM REPORT: CPT | Performed by: EMERGENCY MEDICINE

## 2020-11-12 PROCEDURE — 71045 X-RAY EXAM CHEST 1 VIEW: CPT | Performed by: EMERGENCY MEDICINE

## 2020-11-12 PROCEDURE — 84484 ASSAY OF TROPONIN QUANT: CPT | Performed by: EMERGENCY MEDICINE

## 2020-11-12 PROCEDURE — 99284 EMERGENCY DEPT VISIT MOD MDM: CPT

## 2020-11-12 PROCEDURE — 80048 BASIC METABOLIC PNL TOTAL CA: CPT | Performed by: EMERGENCY MEDICINE

## 2020-11-12 PROCEDURE — 85025 COMPLETE CBC W/AUTO DIFF WBC: CPT | Performed by: EMERGENCY MEDICINE

## 2020-11-12 NOTE — ED INITIAL ASSESSMENT (HPI)
Patient aox3 toed via private  Vehicle patient co of sore throat x Wednesday and Friday, now resolved.  Patient reported since then has had fatigue and chest pain

## 2020-11-12 NOTE — TELEPHONE ENCOUNTER
Action Requested: Summary for Provider     []  Critical Lab, Recommendations Needed  [] Need Additional Advice  [x]   FYI    []   Need Orders  [] Need Medications Sent to Pharmacy  []  Other     SUMMARY: Per protocol disposition advised ER now and pt state

## 2020-11-13 ENCOUNTER — TELEPHONE (OUTPATIENT)
Dept: RHEUMATOLOGY | Facility: CLINIC | Age: 55
End: 2020-11-13

## 2020-11-13 NOTE — TELEPHONE ENCOUNTER
Patient is currently pending covid test results due to chest pain, headache, body aches and back pain. Patient has already discussed symptoms with her pcp. Patient would like to know if she should continue taking her lupus medication. Please advise.

## 2020-11-13 NOTE — TELEPHONE ENCOUNTER
Via , #[id number], patient advised to continue taking medication as prescribed. Pt verbalized understanding and agreeable with plan.

## 2020-11-13 NOTE — ED PROVIDER NOTES
Patient Seen in: [facility] AND [facility] Emergency Department      History   Patient presents with:  Sore Throat  Chest Pain Angina    Stated Complaint: sore throat    HPI    23-year-old female presents for evaluation of sore throat and chest pain.   Patient wi 1142 86   Resp 11/12/20 1142 19   Temp 11/12/20 1141 98.3 °F (36.8 °C)   Temp src 11/12/20 1141 Oral   SpO2 11/12/20 1142 98 %   O2 Device 11/12/20 1141 None (Room air)       Current:/76   Pulse 79   Temp 98.3 °F (36.8 °C) (Oral)   Resp 20   SpO2 98% Abnormality         Status                     ---------                               -----------         ------                     CBC W/ DIFFERENTIAL[[id number]]          Abnormal            Final result                 Please view resu 11/12/20  1230 11/12/20  1245 11/12/20  1300 11/12/20  1315   BP: 127/72 117/80 136/83 134/76   Pulse: 76 75 81 79   Resp: 20 26 20    Temp:       TempSrc:       SpO2: 96% 97% 98% 98%     *I personally reviewed and interpreted all ED vitals.           MDM Impression:  Upper respiratory tract infection, unspecified type  (primary encounter diagnosis)    Disposition:  Discharge  11/12/2020  1:14 pm    Follow-up:  Alem Graham MD  [address] [place] [address] [facility]  [phone]    Call  For follow up

## 2020-11-16 ENCOUNTER — TELEPHONE (OUTPATIENT)
Dept: RHEUMATOLOGY | Facility: CLINIC | Age: 55
End: 2020-11-16

## 2020-11-16 ENCOUNTER — TELEPHONE (OUTPATIENT)
Dept: FAMILY MEDICINE CLINIC | Facility: CLINIC | Age: 55
End: 2020-11-16

## 2020-11-16 ENCOUNTER — TELEMEDICINE (OUTPATIENT)
Dept: RHEUMATOLOGY | Facility: CLINIC | Age: 55
End: 2020-11-16
Payer: COMMERCIAL

## 2020-11-16 DIAGNOSIS — J12.9 VIRAL PNEUMONITIS: Primary | ICD-10-CM

## 2020-11-16 DIAGNOSIS — M32.9 SYSTEMIC LUPUS ERYTHEMATOSUS, UNSPECIFIED SLE TYPE, UNSPECIFIED ORGAN INVOLVEMENT STATUS (HCC): ICD-10-CM

## 2020-11-16 PROCEDURE — 99214 OFFICE O/P EST MOD 30 MIN: CPT | Performed by: INTERNAL MEDICINE

## 2020-11-16 RX ORDER — METHYLPREDNISOLONE 4 MG/1
TABLET ORAL
Qty: 1 PACKAGE | Refills: 0 | Status: SHIPPED | OUTPATIENT
Start: 2020-11-16 | End: 2020-11-24

## 2020-11-16 NOTE — TELEPHONE ENCOUNTER
Per patient she is having chest discomfort, and went to ER last week and would like to talk to Dr Wade Douglas as soon as possible.

## 2020-11-16 NOTE — TELEPHONE ENCOUNTER
Patient has had fever, headache, chills, chest pain, body aches, joint pain (especially in fingers) and back pain. Last night patient had bad pain behind ears. These began roughly two weeks ago.     Patient has had two negative covid tests, her primary refe

## 2020-11-16 NOTE — TELEPHONE ENCOUNTER
Dr. Sang Beltre,     Please sign off on form: FMLA   -Highlight the patient and hit \"Chart\" button.   -In Chart Review, w/in the Encounter tab - click 1 time on the Telephone call encounter for 11/6/20 Scroll down the telephone encounter.  -Click \"scan on\" b

## 2020-11-16 NOTE — TELEPHONE ENCOUNTER
Called and spoke with patient, name and  was verified. Patient agreed to schedule with Dr. Deion Salazar for today at 3:50pm D[employer].

## 2020-11-16 NOTE — TELEPHONE ENCOUNTER
Patient seen in ED 11/12  Rapid and PCR COVID tests were negative  Patient continues to have fever, chills, headache back pain  Also complaining of foamy urine  States these are all similar symptoms she had during her last lupus flare up  Informed patient

## 2020-11-16 NOTE — PATIENT INSTRUCTIONS
1..medrol torres for viral pneumonitis likely flaring lupus   2. Cont. hydroxychlrqouine 200mg twice a day   3.. Tylenol arthirits as needed. 4. Follow up with dr. Nubia Childress tomorrow.

## 2020-11-16 NOTE — PROGRESS NOTES
Derick Bowden is a 49year old female. This visit is conducted using Telemedicine with live, interactive video and audio. Patient has been referred to the [facility] website at [email or web address] to review the yearly Consent to Treat document.     Pa time spent for the visit. HPI:   No chief complaint on file. I had the pleasure of seeing Mrs. Royr Reyes on 3/16/2020. As you recall, she is a pleasant 49 year old who has a hx of SLE. She's on plaquenil 200mg bid.  She was on azathioprine She has bad muscle pains as well. She got covid testing last week and this was negative. Then she went to the ER on 11/12/2020 - covid was run for the 2nd time and it was negative. Rapid strep was negative. She doesn't know what is happening.    Her rash  CVS: chest symmetrical  RS:  no dypsnea  ABD: Soft appearing -       Component      Latest Ref Rng & Units 11/12/2020   WBC      4.0 - 11.0 x10(3) uL 3.0 (L)   RBC      3.80 - 5.30 x10(6)uL 4.50   Hemoglobin      12.0 - 16.0 g/dL 13.5   Hematocrit condensshun ilii. 11/4/2016 - pelvis xray - no acute disease     11/12/2020 -   Chest xray   CONCLUSION:   1. Multifocal pneumonitis versus atypical viral pneumonia most pronounced in the left apex. .    2. Wobwac-fb-fh to interval resolution.       6/20/202 6/2020 - normal     2. Eye exam done Normal,11/2019 -   3. Vaginal bleeding - s/p hysterectomy 2/2013 - hb is stable. 4. righ tknee pain - declines steroid inejction , - cont.  Home exercises learned in pt - tylenol arthriits for pain ,voltaren gel 1% -

## 2020-11-16 NOTE — TELEPHONE ENCOUNTER
Please see below and advise. Patient was sent to ER on 11/12/2020 and advised to follow up with PCP. Do you want to see patient for a video follow up? ASSESSMENT/PLAN:      1. [facility]  1.  SLE - hx of lupus nephtritis cla

## 2020-11-18 ENCOUNTER — LAB ENCOUNTER (OUTPATIENT)
Dept: LAB | Facility: HOSPITAL | Age: 55
End: 2020-11-18
Attending: FAMILY MEDICINE
Payer: COMMERCIAL

## 2020-11-18 ENCOUNTER — TELEMEDICINE (OUTPATIENT)
Dept: FAMILY MEDICINE CLINIC | Facility: CLINIC | Age: 55
End: 2020-11-18
Payer: COMMERCIAL

## 2020-11-18 ENCOUNTER — HOSPITAL ENCOUNTER (OUTPATIENT)
Dept: GENERAL RADIOLOGY | Facility: HOSPITAL | Age: 55
Discharge: HOME | End: 2020-11-18
Attending: FAMILY MEDICINE
Payer: COMMERCIAL

## 2020-11-18 DIAGNOSIS — R07.9 CHEST PAIN, UNSPECIFIED TYPE: Primary | ICD-10-CM

## 2020-11-18 DIAGNOSIS — M32.19 SYSTEMIC LUPUS ERYTHEMATOSUS WITH OTHER ORGAN INVOLVEMENT, UNSPECIFIED SLE TYPE (HCC): ICD-10-CM

## 2020-11-18 DIAGNOSIS — Z87.448 HISTORY OF LUPUS NEPHRITIS: ICD-10-CM

## 2020-11-18 DIAGNOSIS — R07.9 CHEST PAIN, UNSPECIFIED TYPE: ICD-10-CM

## 2020-11-18 PROCEDURE — 99213 OFFICE O/P EST LOW 20 MIN: CPT | Performed by: FAMILY MEDICINE

## 2020-11-18 PROCEDURE — 36415 COLL VENOUS BLD VENIPUNCTURE: CPT

## 2020-11-18 PROCEDURE — 80053 COMPREHEN METABOLIC PANEL: CPT

## 2020-11-18 PROCEDURE — 82043 UR ALBUMIN QUANTITATIVE: CPT

## 2020-11-18 PROCEDURE — 82570 ASSAY OF URINE CREATININE: CPT

## 2020-11-18 PROCEDURE — 81001 URINALYSIS AUTO W/SCOPE: CPT

## 2020-11-18 PROCEDURE — 71046 X-RAY EXAM CHEST 2 VIEWS: CPT | Performed by: FAMILY MEDICINE

## 2020-11-18 PROCEDURE — 86140 C-REACTIVE PROTEIN: CPT

## 2020-11-18 PROCEDURE — 85025 COMPLETE CBC W/AUTO DIFF WBC: CPT

## 2020-11-18 PROCEDURE — 85652 RBC SED RATE AUTOMATED: CPT

## 2020-11-18 RX ORDER — NAPROXEN 500 MG/1
500 TABLET ORAL 2 TIMES DAILY
Qty: 50 TABLET | Refills: 0 | Status: SHIPPED | OUTPATIENT
Start: 2020-11-18 | End: 2021-04-23

## 2020-11-18 RX ORDER — CYCLOBENZAPRINE HCL 10 MG
10 TABLET ORAL 3 TIMES DAILY PRN
Qty: 30 TABLET | Refills: 0 | Status: SHIPPED | OUTPATIENT
Start: 2020-11-18 | End: 2021-04-23 | Stop reason: ALTCHOICE

## 2020-11-18 RX ORDER — ZOLPIDEM TARTRATE 10 MG/1
TABLET ORAL
Qty: 30 TABLET | Refills: 0 | Status: SHIPPED | OUTPATIENT
Start: 2020-11-18 | End: 2021-04-23 | Stop reason: ALTCHOICE

## 2020-11-18 NOTE — PROGRESS NOTES
HPI:    Patient ID: Yesy Avila is a 49year old female.     Telehealth outside of [facility] [address] Verbal Consent   I conducted a telehealth visit with Yesy Avila today, 11/18/20, which was completed using two-way, real-time interactive audio and Systems  Constitutional: see above, feels fatigued. Respiratory: minimal cough no shortness of breath Cardiovascular: Negative. Negative for chest pain, palpitations and leg swelling. Gastrointestinal: Negative. Negative for abdominal pain.    Skin: N Oral Tab 30 tablet 0     Sig: Take 1 tablet (10 mg total) by mouth 3 (three) times daily as needed for Muscle spasms. • naproxen 500 MG Oral Tab 50 tablet 0     Sig: Take 1 tablet (500 mg total) by mouth 2 (two) times daily.    • Zolpidem Tartrate 10 MG O

## 2020-11-24 ENCOUNTER — OFFICE VISIT (OUTPATIENT)
Dept: FAMILY MEDICINE CLINIC | Facility: CLINIC | Age: 55
End: 2020-11-24
Payer: COMMERCIAL

## 2020-11-24 VITALS
HEIGHT: 63 IN | WEIGHT: 149 LBS | DIASTOLIC BLOOD PRESSURE: 76 MMHG | SYSTOLIC BLOOD PRESSURE: 112 MMHG | BODY MASS INDEX: 26.4 KG/M2 | HEART RATE: 80 BPM

## 2020-11-24 DIAGNOSIS — R80.9 PROTEINURIA, UNSPECIFIED TYPE: ICD-10-CM

## 2020-11-24 DIAGNOSIS — N05.9 NEPHRITIS: Primary | ICD-10-CM

## 2020-11-24 DIAGNOSIS — Z12.31 ENCOUNTER FOR SCREENING MAMMOGRAM FOR BREAST CANCER: ICD-10-CM

## 2020-11-24 PROCEDURE — 99213 OFFICE O/P EST LOW 20 MIN: CPT | Performed by: FAMILY MEDICINE

## 2020-11-24 PROCEDURE — 3074F SYST BP LT 130 MM HG: CPT | Performed by: FAMILY MEDICINE

## 2020-11-24 PROCEDURE — 3078F DIAST BP <80 MM HG: CPT | Performed by: FAMILY MEDICINE

## 2020-11-24 PROCEDURE — 99072 ADDL SUPL MATRL&STAF TM PHE: CPT | Performed by: FAMILY MEDICINE

## 2020-11-24 PROCEDURE — 3008F BODY MASS INDEX DOCD: CPT | Performed by: FAMILY MEDICINE

## 2020-11-24 RX ORDER — PANTOPRAZOLE SODIUM 40 MG/1
40 TABLET, DELAYED RELEASE ORAL
Qty: 90 TABLET | Refills: 1 | Status: CANCELLED | OUTPATIENT
Start: 2020-11-24

## 2020-11-24 NOTE — PROGRESS NOTES
HPI:    Patient ID: Alton Reaves is a 49year old female. Patient here for f/u . Doing better, less headache and neck pain is almost gone.    Also refill on some of her medications and f/u blood test.   Urine test shows macroalbumiria,   gfr is though has no wheezes. She has no rales.             ASSESSMENT/PLAN:   Nephritis  (primary encounter diagnosis)  Proteinuria, unspecified type  Needs to see Dr. Guillaume Peña  Also stop taking naprosyn  Will see dr Ghulam Cabral soon also   No orders of the defined types

## 2020-12-02 ENCOUNTER — TELEPHONE (OUTPATIENT)
Dept: FAMILY MEDICINE CLINIC | Facility: CLINIC | Age: 55
End: 2020-12-02

## 2020-12-10 ENCOUNTER — TELEPHONE (OUTPATIENT)
Dept: FAMILY MEDICINE CLINIC | Facility: CLINIC | Age: 55
End: 2020-12-10

## 2020-12-10 NOTE — TELEPHONE ENCOUNTER
Patient, states that she works here at [employer] and they are offering the covid vaccine. Patient, states that she has lupus and would like to know if the doctor recommends for to get the vaccine. Pt is requesting a return call in [address] [place] [address]

## 2020-12-11 ENCOUNTER — OFFICE VISIT (OUTPATIENT)
Dept: OBGYN CLINIC | Facility: CLINIC | Age: 55
End: 2020-12-11
Payer: COMMERCIAL

## 2020-12-11 ENCOUNTER — TELEPHONE (OUTPATIENT)
Dept: RHEUMATOLOGY | Facility: CLINIC | Age: 55
End: 2020-12-11

## 2020-12-11 VITALS — DIASTOLIC BLOOD PRESSURE: 62 MMHG | BODY MASS INDEX: 26 KG/M2 | WEIGHT: 149 LBS | SYSTOLIC BLOOD PRESSURE: 108 MMHG

## 2020-12-11 DIAGNOSIS — Z01.419 WELL WOMAN EXAM WITH ROUTINE GYNECOLOGICAL EXAM: Primary | ICD-10-CM

## 2020-12-11 PROCEDURE — 3078F DIAST BP <80 MM HG: CPT | Performed by: OBSTETRICS & GYNECOLOGY

## 2020-12-11 PROCEDURE — 3074F SYST BP LT 130 MM HG: CPT | Performed by: OBSTETRICS & GYNECOLOGY

## 2020-12-11 PROCEDURE — 99396 PREV VISIT EST AGE 40-64: CPT | Performed by: OBSTETRICS & GYNECOLOGY

## 2020-12-11 NOTE — PROGRESS NOTES
HPI:    Patient ID: Derick Bowden is a 49year old year old female. HPI  Well woman visit  History of lupus and total abdominal hysterectomy. No complaints. Denies breast or pelvic problems or concerns.   Denies any vaginal concerns  Review of Systems Genitourinary:   Pelvic exam was performed with patient supine and chaperone present. External genitalia- normal.  Bartholin's and [place]'s glands normal.  Urethral meatus- without lesions, mass, or discharge.   Urethra- normal without lesion, cyst, mas (OYSTER SHELL CALCIUM 500 + D) 500-200 MG-UNIT Oral Tab, Take 1 tablet by mouth daily. Take 2 tabs. Every day, Disp: , Rfl:     No facility-administered encounter medications on file as of 12/11/2020.

## 2020-12-11 NOTE — TELEPHONE ENCOUNTER
Patient, states that she works her at [employer] and they are offering the covid vaccine. Patient states that she has lupus and would like to know if the doctor recommends for her to get the vaccine. Pt would like a return call in [address] [place] [address]

## 2020-12-11 NOTE — TELEPHONE ENCOUNTER
It will be more appropriate if she talks to her rheumatologist. As far as I know she should receive a vaccination.  Please call her back

## 2020-12-11 NOTE — TELEPHONE ENCOUNTER
Spoke with pt and MD message below given. Pt verb understanding and will call rheum to receive further recommendations.

## 2020-12-14 ENCOUNTER — OFFICE VISIT (OUTPATIENT)
Dept: RHEUMATOLOGY | Facility: CLINIC | Age: 55
End: 2020-12-14
Payer: COMMERCIAL

## 2020-12-14 ENCOUNTER — LAB ENCOUNTER (OUTPATIENT)
Dept: LAB | Facility: HOSPITAL | Age: 55
End: 2020-12-14
Attending: INTERNAL MEDICINE
Payer: COMMERCIAL

## 2020-12-14 VITALS
RESPIRATION RATE: 16 BRPM | WEIGHT: 149 LBS | SYSTOLIC BLOOD PRESSURE: 90 MMHG | HEIGHT: 63 IN | HEART RATE: 70 BPM | DIASTOLIC BLOOD PRESSURE: 65 MMHG | BODY MASS INDEX: 26.4 KG/M2

## 2020-12-14 DIAGNOSIS — M32.19 SYSTEMIC LUPUS ERYTHEMATOSUS WITH OTHER ORGAN INVOLVEMENT, UNSPECIFIED SLE TYPE (HCC): ICD-10-CM

## 2020-12-14 DIAGNOSIS — M32.9 SYSTEMIC LUPUS ERYTHEMATOSUS, UNSPECIFIED SLE TYPE, UNSPECIFIED ORGAN INVOLVEMENT STATUS (HCC): Primary | ICD-10-CM

## 2020-12-14 DIAGNOSIS — Z51.81 THERAPEUTIC DRUG MONITORING: ICD-10-CM

## 2020-12-14 DIAGNOSIS — Z87.448 HISTORY OF LUPUS NEPHRITIS: ICD-10-CM

## 2020-12-14 PROCEDURE — 84450 TRANSFERASE (AST) (SGOT): CPT

## 2020-12-14 PROCEDURE — 3078F DIAST BP <80 MM HG: CPT | Performed by: INTERNAL MEDICINE

## 2020-12-14 PROCEDURE — 82565 ASSAY OF CREATININE: CPT

## 2020-12-14 PROCEDURE — 81003 URINALYSIS AUTO W/O SCOPE: CPT

## 2020-12-14 PROCEDURE — 82570 ASSAY OF URINE CREATININE: CPT

## 2020-12-14 PROCEDURE — 3008F BODY MASS INDEX DOCD: CPT | Performed by: INTERNAL MEDICINE

## 2020-12-14 PROCEDURE — 36415 COLL VENOUS BLD VENIPUNCTURE: CPT

## 2020-12-14 PROCEDURE — 85652 RBC SED RATE AUTOMATED: CPT

## 2020-12-14 PROCEDURE — 99214 OFFICE O/P EST MOD 30 MIN: CPT | Performed by: INTERNAL MEDICINE

## 2020-12-14 PROCEDURE — 85027 COMPLETE CBC AUTOMATED: CPT

## 2020-12-14 PROCEDURE — 86140 C-REACTIVE PROTEIN: CPT

## 2020-12-14 PROCEDURE — 3074F SYST BP LT 130 MM HG: CPT | Performed by: INTERNAL MEDICINE

## 2020-12-14 PROCEDURE — 84460 ALANINE AMINO (ALT) (SGPT): CPT

## 2020-12-14 PROCEDURE — 82043 UR ALBUMIN QUANTITATIVE: CPT

## 2020-12-14 RX ORDER — PREDNISONE 1 MG/1
5 TABLET ORAL DAILY
Qty: 30 TABLET | Refills: 0 | Status: SHIPPED | OUTPATIENT
Start: 2020-12-14 | End: 2021-04-23

## 2020-12-14 NOTE — PATIENT INSTRUCTIONS
1. Joint pain - start prednisone 5mg a day x 1 month  2. Cont. hydroxychlrqouine 200mg twice a day   3.. Tylenol arthirits as needed. 4. Check labs today , then every 3 months. 5. Return to clinic in 6- 8 weeks  -   6.  Follow up with dr. Yani Avendaño

## 2020-12-14 NOTE — PROGRESS NOTES
HPI:   Patient presents with:  SLE  Lab Results  Medication Follow-Up  Back Pain: Upper, onset x1 week  Joint Pain      I had the pleasure of seeing Mrs. New Rivas on  12/14/2020. . As you recall, she is a pleasant 49 year old who has a hx of SLE. feels mostly pain. She has body pain, her knees are hurting. She has bad muscle pains as well. She got covid testing last week and this was negative. Then she went to the ER on 11/12/2020 - covid was run for the 2nd time and it was negative.    Rapid st • HYDROXYCHLOROQUINE SULFATE 200 MG Oral Tab TAKE 1 TABLET BY MOUTH TWICE A  tablet 1   • PANTOPRAZOLE SODIUM 40 MG Oral Tab EC TAKE 1 TABLET (40 MG TOTAL) BY MOUTH EVERY MORNING BEFORE BREAKFAST.  90 tablet 1   • Omega-3 Fatty Acids (FISH OIL OR) Neutrophils %      % 61.7   Lymphocytes %      % 29.1   Monocytes %      % 8.6   Eosinophils %      % 0.0   Basophils %      % 0.3   Immature Granulocyte %      % 0.3   Glucose      70 - 99 mg/dL 110 (H)   Sodium      136 - 145 mmol/L 139   Potassium cxr -   1. Tortuous aorta otherwise normal chest unchanged from January 28, 2013.    11/4/2016 - lumbar spine xray   1. Minimal osteoarthritis. 2. Minimal osteitis condensans ilii.   11/4/2016 - pelvis xray - no acute disease     11/12/2020 -   Chest xray PAIN PRN or tyelnol arthritis 1-2 tablts fro apin - - she will call if pain increases, she doesn't want anything extra at this time. Labs look good.    - headache is 2 days a week - bulbital given - hx of migraines - it helps her -   = echo 6/2020 - normal

## 2020-12-14 NOTE — TELEPHONE ENCOUNTER
Dr. Adela Ahmadi    **2 forms requiring signature**      Please sign off on form: FMLA and Return to work  -Highlight the patient and hit \"Chart\" button.   -In Chart Review, w/in the Encounter tab - click 1 time on the Telephone call encounter for 12/2/2020 Scr

## 2020-12-15 NOTE — TELEPHONE ENCOUNTER
Forms completed and re-faxed to [address] [place] [address] at [phone]. Sent [employer].

## 2020-12-16 ENCOUNTER — OFFICE VISIT (OUTPATIENT)
Dept: NEPHROLOGY | Facility: CLINIC | Age: 55
End: 2020-12-16
Payer: COMMERCIAL

## 2020-12-16 VITALS
WEIGHT: 149 LBS | DIASTOLIC BLOOD PRESSURE: 69 MMHG | SYSTOLIC BLOOD PRESSURE: 111 MMHG | HEART RATE: 57 BPM | BODY MASS INDEX: 26.4 KG/M2 | HEIGHT: 63 IN

## 2020-12-16 DIAGNOSIS — M32.9 SYSTEMIC LUPUS ERYTHEMATOSUS, UNSPECIFIED SLE TYPE, UNSPECIFIED ORGAN INVOLVEMENT STATUS (HCC): ICD-10-CM

## 2020-12-16 DIAGNOSIS — Z87.448 HISTORY OF LUPUS NEPHRITIS: Primary | ICD-10-CM

## 2020-12-16 PROCEDURE — 3074F SYST BP LT 130 MM HG: CPT | Performed by: INTERNAL MEDICINE

## 2020-12-16 PROCEDURE — 3078F DIAST BP <80 MM HG: CPT | Performed by: INTERNAL MEDICINE

## 2020-12-16 PROCEDURE — 3008F BODY MASS INDEX DOCD: CPT | Performed by: INTERNAL MEDICINE

## 2020-12-16 PROCEDURE — 99214 OFFICE O/P EST MOD 30 MIN: CPT | Performed by: INTERNAL MEDICINE

## 2020-12-16 NOTE — TELEPHONE ENCOUNTER
FMLA forms completed and faxed to [address] [place] [address] at [phone], confirmation received. Sent pt a [employer] message.

## 2020-12-17 ENCOUNTER — OFFICE VISIT (OUTPATIENT)
Dept: OPHTHALMOLOGY | Facility: CLINIC | Age: 55
End: 2020-12-17
Payer: COMMERCIAL

## 2020-12-17 DIAGNOSIS — H25.13 AGE-RELATED NUCLEAR CATARACT OF BOTH EYES: Primary | ICD-10-CM

## 2020-12-17 DIAGNOSIS — H04.123 CHRONIC DRYNESS OF BOTH EYES: ICD-10-CM

## 2020-12-17 DIAGNOSIS — Z79.899 LONG-TERM USE OF PLAQUENIL: ICD-10-CM

## 2020-12-17 PROCEDURE — 92014 COMPRE OPH EXAM EST PT 1/>: CPT | Performed by: OPHTHALMOLOGY

## 2020-12-17 NOTE — PROGRESS NOTES
12/16/20        Patient: Debbie Tobar   YOB: 1965   Date of Visit: 12/16/2020       Dear  Dr. Marianne Barclay MD,      Thank you for referring Debbie Tobar to my practice. Please find my assessment and plan below.       As you know she is a 48- therefore reassured the patient that her renal function is normal and that her mild proteinuria is almost completely gone. She will continue to follow-up with rheumatology.   Otherwise I will see her again in 6 months for follow-up or sooner if clinically

## 2020-12-17 NOTE — PROGRESS NOTES
Mare Barrera is a 48year old female. HPI:     HPI     Pt in today for a Plaquenil eye exam.  Per pt is taking 200mg of Plaquenil twice a day.   Pt states vision is stable but complains of dry eyes and was using artificial tears before but stopped usin Female    • No Known Problems Paternal Cousin Male    • No Known Problems Other    • Glaucoma Neg    • Macular degeneration Neg    • Breast Cancer Neg    • Ovarian Cancer Neg    • DCIS Neg    • LCIS Neg    • BRCA gene + Neg    • Ashkenazi Yazidi Descent Ne Pressure 14 12          Pupils       Pupils    Right PERRL    Left PERRL          Visual Fields       Left Right     Full Full          Extraocular Movement       Right Left     Full, Ortho Full, Ortho          Dilation     Both eyes: 1.0% Mydriacyl and 2. patient should gently rub the eyelashes and then rinse thoroughly with warm water. Patient should also use artificial tears (any over the counter brand is okay) up to 4-6  times per day as needed for dry eye symptoms.         Age-related nuclear catarac

## 2020-12-22 ENCOUNTER — HOSPITAL ENCOUNTER (OUTPATIENT)
Dept: MAMMOGRAPHY | Facility: HOSPITAL | Age: 55
Discharge: HOME | End: 2020-12-22
Attending: FAMILY MEDICINE
Payer: COMMERCIAL

## 2020-12-22 DIAGNOSIS — Z12.31 ENCOUNTER FOR SCREENING MAMMOGRAM FOR BREAST CANCER: ICD-10-CM

## 2020-12-22 PROCEDURE — 77067 SCR MAMMO BI INCL CAD: CPT | Performed by: FAMILY MEDICINE

## 2020-12-22 PROCEDURE — 77063 BREAST TOMOSYNTHESIS BI: CPT | Performed by: FAMILY MEDICINE

## 2020-12-23 ENCOUNTER — IMMUNIZATION (OUTPATIENT)
Dept: LAB | Facility: HOSPITAL | Age: 55
End: 2020-12-23
Attending: PREVENTIVE MEDICINE
Payer: COMMERCIAL

## 2020-12-23 DIAGNOSIS — Z23 NEED FOR VACCINATION: ICD-10-CM

## 2020-12-23 PROCEDURE — 0001A SARSCOV2 VAC 30MCG/0.3ML IM: CPT

## 2021-01-13 ENCOUNTER — IMMUNIZATION (OUTPATIENT)
Dept: LAB | Facility: HOSPITAL | Age: 56
End: 2021-01-13
Attending: PREVENTIVE MEDICINE

## 2021-01-13 DIAGNOSIS — Z23 NEED FOR VACCINATION: Primary | ICD-10-CM

## 2021-01-13 PROCEDURE — 0002A SARSCOV2 VAC 30MCG/0.3ML IM: CPT

## 2021-02-02 RX ORDER — PANTOPRAZOLE SODIUM 40 MG/1
TABLET, DELAYED RELEASE ORAL
Qty: 90 TABLET | Refills: 1 | Status: SHIPPED | OUTPATIENT
Start: 2021-02-02 | End: 2021-04-23 | Stop reason: ALTCHOICE

## 2021-02-23 NOTE — PROGRESS NOTES
MICHEAL LYNCH   is a   34   year old    female who is being seen in consultation at the request of   SAVANNAH MAIN   for GI consult given irregular BMs fm h/o in mother of colonic polyps and h/o CRC in maternal cousins onset age early 40s.  She was recently seen by PCP in 11/2020 who advised colonoscopy. She has BMs 1-2x/day, BSS type 4-5 predominately with alternative BSS type 6-7 that occurs every few days: No identifiable triggers. She notes metformin can cause an "upset stomach." She was dx with DMT2 about 4 yrs ago On Metformin same dose ever since onset dx. No blood in stool or BRBPR. No known cardiac or pulmonary disease. Rare NSAID use. Denies chest pain, n/v, abdominal pain, melena, weight loss. No other complaints.   Indiana Palumbo is a 49year old female. Patient presents with:  Lesion: top of left nostril for 3 years, increasing in size       HISTORY OF PRESENT ILLNESS    She presents with a history of an enlarging left nasal alar lesion for the past 3 years.  No ble Neg/Pos Details   Constitutional Negative Fatigue, fever and weight loss. ENMT Negative Drooling. Eyes Negative Blurred vision and vision changes. Respiratory Negative Dyspnea and wheezing.    Cardio Negative Chest pain, irregular heartbeat/palpitatio Normal, Left: Normal.   Excision of left nasal lesion  Time out was performed and the appropriate patient and site were identified and marked. The area in question was infiltrated with 1% Xylocaine with 121 2000 of epinephrine.  On number 11 blade was used

## 2021-04-01 ENCOUNTER — LAB ENCOUNTER (OUTPATIENT)
Dept: LAB | Facility: HOSPITAL | Age: 56
End: 2021-04-01
Attending: INTERNAL MEDICINE
Payer: COMMERCIAL

## 2021-04-01 DIAGNOSIS — Z87.448 HISTORY OF LUPUS NEPHRITIS: ICD-10-CM

## 2021-04-01 DIAGNOSIS — M32.19 SYSTEMIC LUPUS ERYTHEMATOSUS WITH OTHER ORGAN INVOLVEMENT, UNSPECIFIED SLE TYPE (HCC): ICD-10-CM

## 2021-04-01 PROCEDURE — 81001 URINALYSIS AUTO W/SCOPE: CPT

## 2021-04-01 PROCEDURE — 84460 ALANINE AMINO (ALT) (SGPT): CPT

## 2021-04-01 PROCEDURE — 86140 C-REACTIVE PROTEIN: CPT

## 2021-04-01 PROCEDURE — 82570 ASSAY OF URINE CREATININE: CPT

## 2021-04-01 PROCEDURE — 82565 ASSAY OF CREATININE: CPT

## 2021-04-01 PROCEDURE — 84450 TRANSFERASE (AST) (SGOT): CPT

## 2021-04-01 PROCEDURE — 85027 COMPLETE CBC AUTOMATED: CPT

## 2021-04-01 PROCEDURE — 36415 COLL VENOUS BLD VENIPUNCTURE: CPT

## 2021-04-01 PROCEDURE — 85652 RBC SED RATE AUTOMATED: CPT

## 2021-04-01 PROCEDURE — 82043 UR ALBUMIN QUANTITATIVE: CPT

## 2021-04-22 ENCOUNTER — TELEPHONE (OUTPATIENT)
Dept: ADMINISTRATIVE | Age: 56
End: 2021-04-22

## 2021-04-22 ENCOUNTER — MED REC SCAN ONLY (OUTPATIENT)
Dept: ADMINISTRATIVE | Age: 56
End: 2021-04-22

## 2021-04-22 RX ORDER — HYDROXYCHLOROQUINE SULFATE 200 MG/1
200 TABLET, FILM COATED ORAL 2 TIMES DAILY
Qty: 180 TABLET | Refills: 1 | Status: SHIPPED | OUTPATIENT
Start: 2021-04-22 | End: 2021-09-15

## 2021-04-22 NOTE — TELEPHONE ENCOUNTER
Patient is requesting a refill on following medication, she has enough for about 1 week left    Medication Detail    Medication Quantity Refills Start End   HYDROXYCHLOROQUINE SULFATE 200 MG Oral Tab 180 tablet 1 11/5/2020    Sig:   TAKE 1 TABLET BY MOUTH

## 2021-04-22 NOTE — TELEPHONE ENCOUNTER
FMLA logged and patient notified she needs recent appt. [facility] sent for HIPAA.

## 2021-04-22 NOTE — TELEPHONE ENCOUNTER
Last seen 12/14, labs completed 4/1/21. WBC 2.8. Liver and kidney function WNL, negative inflammatory markers.   Please advise on refill request.

## 2021-04-23 ENCOUNTER — HOSPITAL ENCOUNTER (OUTPATIENT)
Dept: GENERAL RADIOLOGY | Facility: HOSPITAL | Age: 56
Discharge: HOME | End: 2021-04-23
Attending: FAMILY MEDICINE
Payer: COMMERCIAL

## 2021-04-23 ENCOUNTER — LAB ENCOUNTER (OUTPATIENT)
Dept: LAB | Age: 56
End: 2021-04-23
Attending: FAMILY MEDICINE
Payer: COMMERCIAL

## 2021-04-23 ENCOUNTER — OFFICE VISIT (OUTPATIENT)
Dept: FAMILY MEDICINE CLINIC | Facility: CLINIC | Age: 56
End: 2021-04-23
Payer: COMMERCIAL

## 2021-04-23 VITALS
DIASTOLIC BLOOD PRESSURE: 68 MMHG | BODY MASS INDEX: 26.05 KG/M2 | HEIGHT: 63 IN | SYSTOLIC BLOOD PRESSURE: 99 MMHG | HEART RATE: 63 BPM | WEIGHT: 147 LBS

## 2021-04-23 DIAGNOSIS — R93.89 ABNORMAL RADIOGRAPH: ICD-10-CM

## 2021-04-23 DIAGNOSIS — R73.03 PREDIABETES: ICD-10-CM

## 2021-04-23 DIAGNOSIS — R73.03 PREDIABETES: Primary | ICD-10-CM

## 2021-04-23 PROCEDURE — 3074F SYST BP LT 130 MM HG: CPT | Performed by: FAMILY MEDICINE

## 2021-04-23 PROCEDURE — 3008F BODY MASS INDEX DOCD: CPT | Performed by: FAMILY MEDICINE

## 2021-04-23 PROCEDURE — 36415 COLL VENOUS BLD VENIPUNCTURE: CPT

## 2021-04-23 PROCEDURE — 3078F DIAST BP <80 MM HG: CPT | Performed by: FAMILY MEDICINE

## 2021-04-23 PROCEDURE — 99213 OFFICE O/P EST LOW 20 MIN: CPT | Performed by: FAMILY MEDICINE

## 2021-04-23 PROCEDURE — 80061 LIPID PANEL: CPT

## 2021-04-23 PROCEDURE — 83036 HEMOGLOBIN GLYCOSYLATED A1C: CPT

## 2021-04-23 PROCEDURE — 71046 X-RAY EXAM CHEST 2 VIEWS: CPT | Performed by: FAMILY MEDICINE

## 2021-04-23 RX ORDER — IBUPROFEN 600 MG/1
600 TABLET ORAL EVERY 6 HOURS PRN
COMMUNITY
Start: 2021-04-02 | End: 2022-01-12 | Stop reason: ALTCHOICE

## 2021-04-23 RX ORDER — DEXAMETHASONE 4 MG/1
TABLET ORAL
COMMUNITY
Start: 2021-04-02 | End: 2021-04-23 | Stop reason: ALTCHOICE

## 2021-04-23 NOTE — PROGRESS NOTES
HPI/Subjective:   Patient ID: Diaz Sinclair is a 48year old female. Name: Diaz Sinclair  Date: 4/23/2021    Referring Physician: No ref.  provider found    HISTORY OF PRESENT ILLNESS   Diaz Sinclair is a 48year old female who presents for     Prior Allergies:No Known Allergies    Objective:   Physical Exam  Constitutional:       Appearance: She is well-developed. Cardiovascular:      Rate and Rhythm: Normal rate and regular rhythm. Heart sounds: Normal heart sounds.    Pulmonary:      Effor

## 2021-04-26 NOTE — TELEPHONE ENCOUNTER
Dr. Syd Quijano,     Please sign off on form: Fmla Recert  -Highlight the patient and hit \"Chart\" button.   -In Chart Review, w/in the Encounter tab - click 1 time on the Telephone call encounter for 4/22/21 Scroll down the telephone encounter.  -Click Mathieu Burgess

## 2021-04-28 NOTE — TELEPHONE ENCOUNTER
Pt called requesting employee statement to be faxed to [facility].  Completed pt's request.

## 2021-05-12 NOTE — TELEPHONE ENCOUNTER
Pt came to the [facility]  requesting dates to be changed on [facility] recert forms, completed request and faxed revised forms to [address] [place] [address] at [phone], confirmation received. Pt is aware.

## 2021-07-02 ENCOUNTER — OFFICE VISIT (OUTPATIENT)
Dept: OTHER | Facility: HOSPITAL | Age: 56
End: 2021-07-02
Attending: EMERGENCY MEDICINE

## 2021-07-02 DIAGNOSIS — Z77.21 PERSONAL HISTORY OF EXPOSURE TO POTENTIALLY HAZARDOUS BODY FLUIDS: Primary | ICD-10-CM

## 2021-07-02 LAB
HBV SURFACE AB SER QL: NONREACTIVE
HBV SURFACE AB SERPL IA-ACNC: <3.1 MIU/ML
HCV AB SERPL QL IA: NONREACTIVE

## 2021-07-02 PROCEDURE — 86803 HEPATITIS C AB TEST: CPT

## 2021-07-02 PROCEDURE — 86706 HEP B SURFACE ANTIBODY: CPT

## 2021-07-02 PROCEDURE — 87389 HIV-1 AG W/HIV-1&-2 AB AG IA: CPT

## 2021-07-29 NOTE — TELEPHONE ENCOUNTER
Forms completed and faxed to [address] [place] [address] at [phone]. Sent [employer]. Xolair Counseling:  Patient informed of potential adverse effects including but not limited to fever, muscle aches, rash and allergic reactions.  The patient verbalized understanding of the proper use and possible adverse effects of Xolair.  All of the patient's questions and concerns were addressed.

## 2021-08-02 ENCOUNTER — APPOINTMENT (OUTPATIENT)
Dept: OTHER | Facility: HOSPITAL | Age: 56
End: 2021-08-02
Attending: ORTHOPAEDIC SURGERY

## 2021-08-13 ENCOUNTER — OFFICE VISIT (OUTPATIENT)
Dept: OTHER | Facility: HOSPITAL | Age: 56
End: 2021-08-13
Attending: EMERGENCY MEDICINE

## 2021-08-13 DIAGNOSIS — Z00.00 ROUTINE GENERAL MEDICAL EXAMINATION AT A HEALTH CARE FACILITY: Primary | ICD-10-CM

## 2021-08-13 PROCEDURE — 87389 HIV-1 AG W/HIV-1&-2 AB AG IA: CPT

## 2021-08-20 ENCOUNTER — TELEPHONE (OUTPATIENT)
Dept: RHEUMATOLOGY | Facility: CLINIC | Age: 56
End: 2021-08-20

## 2021-08-20 DIAGNOSIS — Z51.81 THERAPEUTIC DRUG MONITORING: Primary | ICD-10-CM

## 2021-08-20 NOTE — TELEPHONE ENCOUNTER
Dr. Brian Londono, patient is at registration for labs. Standing orders have . New order placed, please sign.     \"Summary:  1. Joint pain - start prednisone 5mg a day x 1 month  2. Cont. hydroxychlrqouine 200mg twice a day   3.. Tylenol arthirits as Lore Downey

## 2021-08-20 NOTE — TELEPHONE ENCOUNTER
[facility] calling stating pt is at lab and pt usually has blood work from Dr that needs to be done every 3 months. Pt needs nnew order for the blood work.

## 2021-08-27 ENCOUNTER — LAB ENCOUNTER (OUTPATIENT)
Dept: LAB | Facility: HOSPITAL | Age: 56
End: 2021-08-27
Attending: INTERNAL MEDICINE
Payer: COMMERCIAL

## 2021-08-27 DIAGNOSIS — Z51.81 THERAPEUTIC DRUG MONITORING: ICD-10-CM

## 2021-08-27 LAB
ALT SERPL-CCNC: 23 U/L
AST SERPL-CCNC: 19 U/L (ref 15–37)
BASOPHILS # BLD AUTO: 0.02 X10(3) UL (ref 0–0.2)
BASOPHILS NFR BLD AUTO: 0.6 %
BILIRUB UR QL: NEGATIVE
CLARITY UR: CLEAR
COLOR UR: YELLOW
CREAT BLD-MCNC: 0.48 MG/DL
CREAT UR-SCNC: 47.1 MG/DL
CRP SERPL-MCNC: <0.29 MG/DL (ref ?–0.3)
DEPRECATED RDW RBC AUTO: 45.3 FL (ref 35.1–46.3)
EOSINOPHIL # BLD AUTO: 0.09 X10(3) UL (ref 0–0.7)
EOSINOPHIL NFR BLD AUTO: 2.9 %
ERYTHROCYTE [DISTWIDTH] IN BLOOD BY AUTOMATED COUNT: 13.2 % (ref 11–15)
ERYTHROCYTE [SEDIMENTATION RATE] IN BLOOD: 12 MM/HR
GLUCOSE UR-MCNC: NEGATIVE MG/DL
HCT VFR BLD AUTO: 41.4 %
HGB BLD-MCNC: 13.3 G/DL
HGB UR QL STRIP.AUTO: NEGATIVE
IMM GRANULOCYTES # BLD AUTO: 0 X10(3) UL (ref 0–1)
IMM GRANULOCYTES NFR BLD: 0 %
KETONES UR-MCNC: NEGATIVE MG/DL
LEUKOCYTE ESTERASE UR QL STRIP.AUTO: NEGATIVE
LYMPHOCYTES # BLD AUTO: 1.34 X10(3) UL (ref 1–4)
LYMPHOCYTES NFR BLD AUTO: 42.5 %
MCH RBC QN AUTO: 30 PG (ref 26–34)
MCHC RBC AUTO-ENTMCNC: 32.1 G/DL (ref 31–37)
MCV RBC AUTO: 93.2 FL
MICROALBUMIN UR-MCNC: 0.8 MG/DL
MICROALBUMIN/CREAT 24H UR-RTO: 17 UG/MG (ref ?–30)
MONOCYTES # BLD AUTO: 0.24 X10(3) UL (ref 0.1–1)
MONOCYTES NFR BLD AUTO: 7.6 %
NEUTROPHILS # BLD AUTO: 1.46 X10 (3) UL (ref 1.5–7.7)
NEUTROPHILS # BLD AUTO: 1.46 X10(3) UL (ref 1.5–7.7)
NEUTROPHILS NFR BLD AUTO: 46.4 %
NITRITE UR QL STRIP.AUTO: NEGATIVE
PH UR: 7 [PH] (ref 5–8)
PLATELET # BLD AUTO: 163 10(3)UL (ref 150–450)
PROT UR-MCNC: NEGATIVE MG/DL
RBC # BLD AUTO: 4.44 X10(6)UL
SP GR UR STRIP: 1.01 (ref 1–1.03)
UROBILINOGEN UR STRIP-ACNC: <2
WBC # BLD AUTO: 3.2 X10(3) UL (ref 4–11)

## 2021-08-27 PROCEDURE — 36415 COLL VENOUS BLD VENIPUNCTURE: CPT

## 2021-08-27 PROCEDURE — 82043 UR ALBUMIN QUANTITATIVE: CPT

## 2021-08-27 PROCEDURE — 84450 TRANSFERASE (AST) (SGOT): CPT

## 2021-08-27 PROCEDURE — 82565 ASSAY OF CREATININE: CPT

## 2021-08-27 PROCEDURE — 82570 ASSAY OF URINE CREATININE: CPT

## 2021-08-27 PROCEDURE — 85652 RBC SED RATE AUTOMATED: CPT

## 2021-08-27 PROCEDURE — 81003 URINALYSIS AUTO W/O SCOPE: CPT

## 2021-08-27 PROCEDURE — 85025 COMPLETE CBC W/AUTO DIFF WBC: CPT

## 2021-08-27 PROCEDURE — 84460 ALANINE AMINO (ALT) (SGPT): CPT

## 2021-08-27 PROCEDURE — 86140 C-REACTIVE PROTEIN: CPT

## 2021-09-15 ENCOUNTER — OFFICE VISIT (OUTPATIENT)
Dept: RHEUMATOLOGY | Facility: CLINIC | Age: 56
End: 2021-09-15
Payer: COMMERCIAL

## 2021-09-15 VITALS
SYSTOLIC BLOOD PRESSURE: 129 MMHG | HEIGHT: 63 IN | HEART RATE: 56 BPM | DIASTOLIC BLOOD PRESSURE: 84 MMHG | RESPIRATION RATE: 16 BRPM | WEIGHT: 145 LBS | BODY MASS INDEX: 25.69 KG/M2

## 2021-09-15 DIAGNOSIS — Z51.81 THERAPEUTIC DRUG MONITORING: ICD-10-CM

## 2021-09-15 DIAGNOSIS — M32.9 SYSTEMIC LUPUS ERYTHEMATOSUS, UNSPECIFIED SLE TYPE, UNSPECIFIED ORGAN INVOLVEMENT STATUS (HCC): Primary | ICD-10-CM

## 2021-09-15 PROCEDURE — 3079F DIAST BP 80-89 MM HG: CPT | Performed by: INTERNAL MEDICINE

## 2021-09-15 PROCEDURE — 3008F BODY MASS INDEX DOCD: CPT | Performed by: INTERNAL MEDICINE

## 2021-09-15 PROCEDURE — 99214 OFFICE O/P EST MOD 30 MIN: CPT | Performed by: INTERNAL MEDICINE

## 2021-09-15 PROCEDURE — 3074F SYST BP LT 130 MM HG: CPT | Performed by: INTERNAL MEDICINE

## 2021-09-15 RX ORDER — CYCLOBENZAPRINE HCL 5 MG
5 TABLET ORAL NIGHTLY PRN
Qty: 30 TABLET | Refills: 1 | Status: SHIPPED | OUTPATIENT
Start: 2021-09-15

## 2021-09-15 RX ORDER — HYDROXYCHLOROQUINE SULFATE 200 MG/1
200 TABLET, FILM COATED ORAL 2 TIMES DAILY
Qty: 180 TABLET | Refills: 3 | Status: SHIPPED | OUTPATIENT
Start: 2021-09-15

## 2021-09-15 NOTE — PATIENT INSTRUCTIONS
1.  Can take ibuprofen 200mg  every 8-12 hours as needed - advit or motrin  2. Try cyclobenazprien 5mg - at night as needed  x 1 month  3. Cont. hydroxychlrqouine 200mg twice a day    4. Check labs today , then every 3 months.    5. Return to clinic in 6 mo

## 2021-09-15 NOTE — PROGRESS NOTES
HPI:   Patient presents with:  SLE  Lab Results  Medication Follow-Up  Joint Pain      I had the pleasure of seeing Mrs. Alvaro Valenzuela. . As you recall, she is a pleasant 48 year old who has a hx of SLE. She's on plaquenil 200mg bid.  She was on azat hurting. She has bad muscle pains as well. She got covid testing last week and this was negative. Then she went to the ER on 11/12/2020 - covid was run for the 2nd time and it was negative. Rapid strep was negative.    She doesn't know what is happeni cataract of both eyes 7/7/2015   • Anemia    • Arthritis    • Back problem    • Dry eye 2013    per mazin schirmer 10mm/5mm   • Early cataract 2013   • History of blood transfusion 2013    2 units   • Lipid screening 1/29/2013    per MAZIN   • Long-term use of P 80.0 - 100.0 fL 89.6   MCH      26.0 - 34.0 pg 29.8   MCHC      31.0 - 37.0 g/dL 33.3   RDW-SD      35.1 - 46.3 fL 42.7   RDW      11.0 - 15.0 % 13.0   Platelet Count      170.8 - 450.0 10(3)uL 168.0   Prelim Neutrophil Abs      1.50 - 7.70 x10 (3) uL 1.80 Urobilinogen Urine      <2.0 <2.0   Nitrite Urine      Negative Negative   Leukocyte Esterase Urine      Negative Negative   WBC Urine      0 - 5 /HPF <1   RBC URINE      0 - 2 /HPF 1   Bacteria Urine      None Seen /HPF Negative   MALB URINE      mg/dL mg/dL 0.80   CREATININE UR RANDOM      mg/dL 47.10   MALB/CRE CALC      <=30.0 ug/mg 17. 0   CREATININE      0.55 - 1.02 mg/dL 0.48 (L)   eGFR NON-AFR.  AMERICAN      >=60 112   eGFR       >=60 129   SED RATE      0 - 30 mm/Hr 12   C-REACTIV doing well, in remission,has leukopenia -   Stable   - flared after  viral infection on 11/12/2020 - -leukopenia and slightly low platelets now with high fever and pneumonitis on chest xray ,  better after  medrol torres -- tyelnol as needed -  And s/pt predn

## 2021-10-01 ENCOUNTER — APPOINTMENT (OUTPATIENT)
Dept: CT IMAGING | Facility: HOSPITAL | Age: 56
End: 2021-10-01
Attending: NURSE PRACTITIONER
Payer: COMMERCIAL

## 2021-10-01 ENCOUNTER — APPOINTMENT (OUTPATIENT)
Dept: GENERAL RADIOLOGY | Facility: HOSPITAL | Age: 56
End: 2021-10-01
Attending: NURSE PRACTITIONER
Payer: COMMERCIAL

## 2021-10-01 ENCOUNTER — HOSPITAL ENCOUNTER (EMERGENCY)
Facility: HOSPITAL | Age: 56
Discharge: HOME | End: 2021-10-01
Payer: COMMERCIAL

## 2021-10-01 VITALS
SYSTOLIC BLOOD PRESSURE: 124 MMHG | WEIGHT: 150 LBS | DIASTOLIC BLOOD PRESSURE: 81 MMHG | HEIGHT: 63 IN | TEMPERATURE: 98 F | HEART RATE: 102 BPM | OXYGEN SATURATION: 97 % | BODY MASS INDEX: 26.58 KG/M2 | RESPIRATION RATE: 18 BRPM
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
## 2021-10-01 DIAGNOSIS — S39.012A STRAIN OF LUMBAR REGION, INITIAL ENCOUNTER: ICD-10-CM

## 2021-10-01 DIAGNOSIS — S16.1XXA STRAIN OF NECK MUSCLE, INITIAL ENCOUNTER: ICD-10-CM

## 2021-10-01 PROCEDURE — 72125 CT NECK SPINE W/O DYE: CPT | Performed by: NURSE PRACTITIONER

## 2021-10-01 PROCEDURE — 72100 X-RAY EXAM L-S SPINE 2/3 VWS: CPT | Performed by: NURSE PRACTITIONER

## 2021-10-01 PROCEDURE — 99284 EMERGENCY DEPT VISIT MOD MDM: CPT

## 2021-10-01 RX ORDER — TRAMADOL HYDROCHLORIDE 50 MG/1
TABLET ORAL EVERY 6 HOURS PRN
Qty: 10 TABLET | Refills: 0 | Status: SHIPPED | OUTPATIENT
Start: 2021-10-01 | End: 2021-10-08

## 2021-10-01 RX ORDER — TRAMADOL HYDROCHLORIDE 50 MG/1
50 TABLET ORAL ONCE
Status: COMPLETED | OUTPATIENT
Start: 2021-10-01 | End: 2021-10-01
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
## 2021-10-02 NOTE — ED QUICK NOTES
Discharge order placed by ER RUTHY Sadler. At time of discharge, patient AAO x4, speech clear, respirations regular and nonlabored, patient is able to ambulate unassisted with steady gait.  Patient verbalizes understanding of discharge instructions includ

## 2021-10-04 ENCOUNTER — OFFICE VISIT (OUTPATIENT)
Dept: OTHER | Facility: HOSPITAL | Age: 56
End: 2021-10-04
Attending: EMERGENCY MEDICINE

## 2021-10-04 DIAGNOSIS — Z77.21 PERSONAL HISTORY OF EXPOSURE TO POTENTIALLY HAZARDOUS BODY FLUIDS: Primary | ICD-10-CM

## 2021-10-04 PROCEDURE — 86803 HEPATITIS C AB TEST: CPT

## 2021-10-13 ENCOUNTER — TELEPHONE (OUTPATIENT)
Dept: FAMILY MEDICINE CLINIC | Facility: CLINIC | Age: 56
End: 2021-10-13

## 2021-10-13 NOTE — TELEPHONE ENCOUNTER
Pt will be submitting re cert fmla, all information will be the same.  Mychart consent on file for [employer]

## 2021-10-19 ENCOUNTER — OFFICE VISIT (OUTPATIENT)
Dept: OTOLARYNGOLOGY | Facility: CLINIC | Age: 56
End: 2021-10-19
Payer: COMMERCIAL

## 2021-10-19 VITALS — WEIGHT: 150 LBS | HEIGHT: 63 IN | BODY MASS INDEX: 26.58 KG/M2

## 2021-10-19 DIAGNOSIS — H92.02 LEFT EAR PAIN: Primary | ICD-10-CM

## 2021-10-19 PROCEDURE — 3008F BODY MASS INDEX DOCD: CPT | Performed by: OTOLARYNGOLOGY

## 2021-10-19 PROCEDURE — 99203 OFFICE O/P NEW LOW 30 MIN: CPT | Performed by: OTOLARYNGOLOGY

## 2021-10-19 RX ORDER — CELECOXIB 200 MG/1
200 CAPSULE ORAL DAILY PRN
Qty: 30 CAPSULE | Refills: 0 | Status: SHIPPED | OUTPATIENT
Start: 2021-10-19 | End: 2021-11-18

## 2021-10-19 RX ORDER — CYCLOBENZAPRINE HCL 5 MG
5 TABLET ORAL NIGHTLY
Qty: 30 TABLET | Refills: 1 | Status: SHIPPED | OUTPATIENT
Start: 2021-10-19

## 2021-10-19 NOTE — PROGRESS NOTES
Yesy Avila is a 48year old female. Patient presents with:  Ear Problem: patient is here today for having alot of ear pain and swelling in her cheek since febuary, she was told by her dentist it is due to her teeth that need to be removed.        HISTO Age of Onset   • Other (Other) Father         per ng prostatitis   • No Known Problems Mother    • Diabetes Sister    • Diabetes Brother    • No Known Problems Son    • No Known Problems Maternal Grandmother    • No Known Problems Maternal Grandfather    • vision and vision changes. Respiratory Negative Dyspnea and wheezing. Cardio Negative Chest pain, irregular heartbeat/palpitations and syncope. GI Negative Abdominal pain and diarrhea. Endocrine Negative Cold intolerance and heat intolerance.    Kasi •  celecoxib 200 MG Oral Cap, Take 1 capsule (200 mg total) by mouth daily as needed for Pain., Disp: 30 capsule, Rfl: 0  •  cyclobenzaprine 5 MG Oral Tab, Take 1 tablet (5 mg total) by mouth nightly., Disp: 30 tablet, Rfl: 1  •  cyclobenzaprine 5 MG Ora software. As a result errors may occur. When identified these errors have been corrected. While every attempt is made to correct errors during dictation discrepancies may still exist    Puma Collazo Che, MD    10/19/2021    4:36 PM

## 2021-10-25 NOTE — TELEPHONE ENCOUNTER
Dr. Zhao Tripp,     Please sign off on form: FMla   -Highlight the patient and hit \"Chart\" button.   -In Chart Review, w/in the Encounter tab - click 1 time on the Telephone call encounter for 10/13/21 Scroll down the telephone encounter.  -Click \"scan on\"

## 2021-11-02 ENCOUNTER — OFFICE VISIT (OUTPATIENT)
Dept: OTHER | Facility: HOSPITAL | Age: 56
End: 2021-11-02
Attending: ORTHOPAEDIC SURGERY

## 2021-11-02 DIAGNOSIS — Z77.21 PERSONAL HISTORY OF EXPOSURE TO POTENTIALLY HAZARDOUS BODY FLUIDS: Primary | ICD-10-CM

## 2021-11-02 PROCEDURE — 87389 HIV-1 AG W/HIV-1&-2 AB AG IA: CPT

## 2021-11-16 ENCOUNTER — OFFICE VISIT (OUTPATIENT)
Dept: OTOLARYNGOLOGY | Facility: CLINIC | Age: 56
End: 2021-11-16
Payer: COMMERCIAL

## 2021-11-16 VITALS — TEMPERATURE: 98 F

## 2021-11-16 DIAGNOSIS — R22.0 SWELLING OF LEFT SIDE OF FACE: ICD-10-CM

## 2021-11-16 DIAGNOSIS — H92.02 LEFT EAR PAIN: Primary | ICD-10-CM

## 2021-11-16 PROCEDURE — 99213 OFFICE O/P EST LOW 20 MIN: CPT | Performed by: OTOLARYNGOLOGY

## 2021-11-16 RX ORDER — PANTOPRAZOLE SODIUM 20 MG/1
20 TABLET, DELAYED RELEASE ORAL
COMMUNITY

## 2021-11-16 RX ORDER — CELECOXIB 200 MG/1
200 CAPSULE ORAL DAILY PRN
Qty: 30 CAPSULE | Refills: 0 | Status: SHIPPED | OUTPATIENT
Start: 2021-11-16 | End: 2021-12-16

## 2021-11-16 NOTE — PROGRESS NOTES
Moshe Khalil is a 48year old female. Patient presents with: Follow - Up: 1 month regarding left ear pain. per patient has some improvement. states still having some swelling on left side of jaw.        HISTORY OF PRESENT ILLNESS  She presents with a h Alcohol/week: 2.0 standard drinks        Types: 2 Cans of beer per week        Comment: occasionally      Drug use: No      Sexual activity: Never        Birth control/protection: Hysterectomy    Other Topics      Concerns:        Caffeine Concern:  Yes Laterality Date   • BLOOD TRANSFUSIONS  2013    per ng 2 unit   • CATARACT  2013    per ng early   • COLONOSCOPY N/A 11/16/2018    Procedure: COLONOSCOPY;  Surgeon: Leola Lopes MD;  Location: [address] [place] [address] ENDOSCOPY   • HYSTERECTOMY  2013   • Marleen Anterior cervical. Posterior cervical. Supraclavicular.         Nose/Mouth/Throat Normal External nose - Normal. Lips/teeth-tenderness to palpation to one of her teeth of her maxilla on the left./gums - Normal. Tonsils - Normal. Oropharynx - Normal.   Nose/ study        This note was prepared using [employer] voice recognition dictation software. As a result errors may occur. When identified these errors have been corrected.  While every attempt is made to correct errors during dictation discrepancies may

## 2021-11-24 ENCOUNTER — TELEPHONE (OUTPATIENT)
Dept: OTOLARYNGOLOGY | Facility: CLINIC | Age: 56
End: 2021-11-24

## 2021-11-26 NOTE — TELEPHONE ENCOUNTER
Informed patient that referral was authorized for CT facial bone ,advised to call insurance to check for any out of pocket costs ,pt verbalized understanding.

## 2021-12-03 ENCOUNTER — IMMUNIZATION (OUTPATIENT)
Dept: LAB | Facility: HOSPITAL | Age: 56
End: 2021-12-03
Attending: EMERGENCY MEDICINE
Payer: COMMERCIAL

## 2021-12-03 DIAGNOSIS — Z23 NEED FOR VACCINATION: Primary | ICD-10-CM

## 2021-12-03 PROCEDURE — 0004A SARSCOV2 VAC 30MCG/0.3ML IM: CPT

## 2021-12-06 ENCOUNTER — TELEPHONE (OUTPATIENT)
Dept: NEPHROLOGY | Facility: CLINIC | Age: 56
End: 2021-12-06

## 2021-12-06 DIAGNOSIS — Z87.448 HISTORY OF LUPUS NEPHRITIS: Primary | ICD-10-CM

## 2021-12-06 NOTE — TELEPHONE ENCOUNTER
Pt has apt on 1/12/22. Requesting if labs need to be completed before. LOV 12/16/20. Last labs 8/27.

## 2021-12-08 ENCOUNTER — HOSPITAL ENCOUNTER (OUTPATIENT)
Dept: CT IMAGING | Facility: HOSPITAL | Age: 56
Discharge: HOME | End: 2021-12-08
Attending: OTOLARYNGOLOGY
Payer: COMMERCIAL

## 2021-12-08 DIAGNOSIS — R22.0 SWELLING OF LEFT SIDE OF FACE: ICD-10-CM

## 2021-12-08 PROCEDURE — 70486 CT MAXILLOFACIAL W/O DYE: CPT | Performed by: OTOLARYNGOLOGY

## 2021-12-15 ENCOUNTER — LAB ENCOUNTER (OUTPATIENT)
Dept: LAB | Facility: HOSPITAL | Age: 56
End: 2021-12-15
Attending: INTERNAL MEDICINE
Payer: COMMERCIAL

## 2021-12-15 ENCOUNTER — OFFICE VISIT (OUTPATIENT)
Dept: OPHTHALMOLOGY | Facility: CLINIC | Age: 56
End: 2021-12-15
Payer: COMMERCIAL

## 2021-12-15 DIAGNOSIS — H25.13 AGE-RELATED NUCLEAR CATARACT OF BOTH EYES: ICD-10-CM

## 2021-12-15 DIAGNOSIS — Z79.899 LONG-TERM USE OF PLAQUENIL: Primary | ICD-10-CM

## 2021-12-15 DIAGNOSIS — H04.123 CHRONIC DRYNESS OF BOTH EYES: ICD-10-CM

## 2021-12-15 DIAGNOSIS — Z51.81 THERAPEUTIC DRUG MONITORING: ICD-10-CM

## 2021-12-15 PROCEDURE — 92015 DETERMINE REFRACTIVE STATE: CPT | Performed by: OPHTHALMOLOGY

## 2021-12-15 PROCEDURE — 36415 COLL VENOUS BLD VENIPUNCTURE: CPT

## 2021-12-15 PROCEDURE — 92014 COMPRE OPH EXAM EST PT 1/>: CPT | Performed by: OPHTHALMOLOGY

## 2021-12-15 PROCEDURE — 85025 COMPLETE CBC W/AUTO DIFF WBC: CPT

## 2021-12-15 PROCEDURE — 85652 RBC SED RATE AUTOMATED: CPT

## 2021-12-15 PROCEDURE — 82565 ASSAY OF CREATININE: CPT

## 2021-12-15 PROCEDURE — 82043 UR ALBUMIN QUANTITATIVE: CPT

## 2021-12-15 PROCEDURE — 84450 TRANSFERASE (AST) (SGOT): CPT

## 2021-12-15 PROCEDURE — 86140 C-REACTIVE PROTEIN: CPT

## 2021-12-15 PROCEDURE — 82570 ASSAY OF URINE CREATININE: CPT

## 2021-12-15 PROCEDURE — 81001 URINALYSIS AUTO W/SCOPE: CPT

## 2021-12-15 PROCEDURE — 84460 ALANINE AMINO (ALT) (SGPT): CPT

## 2021-12-15 NOTE — PROGRESS NOTES
Kathrin Chapman is a 48year old female. HPI:     HPI     Consult     Comments: Per Dr Seth Hester is here for a Plaquenil eye exam. Pt takes Plaquenil 200 MG PO BID for Lupus. Pt follows with Dr Niharika Blanchard every 6 months.  Pt com Problems Daughter    • No Known Problems Maternal Aunt    • No Known Problems Paternal Aunt    • No Known Problems Maternal Cousin Female    • No Known Problems Maternal Cousin Male    • No Known Problems Paternal Cousin Female    • No Known Problems Pater Respiratory, Psychiatric, Allergic/Imm, Heme/Lymph    Last edited by Jet Jimenes OT on 12/15/2021 10:49 AM. (History)          PHYSICAL EXAM:     Base Eye Exam     Visual Acuity (Snellen - Linear)       Right Left    Dist cc 20/30 +2 20/30 +2    Dist ph c bifocal                 ASSESSMENT/PLAN:     Diagnoses and Plan:     Age-related nuclear cataract of both eyes  Discussed early cataracts with patient. No treatment recommended at this time. New glasses; suggest update.      Long-term use of Plaquenil

## 2021-12-15 NOTE — PATIENT INSTRUCTIONS
Age-related nuclear cataract of both eyes  Discussed early cataracts with patient. No treatment recommended at this time. New glasses; suggest update. Long-term use of Plaquenil   No evidence of plaquenil toxicity in either eye.   Will follow in 1 ye

## 2021-12-29 ENCOUNTER — PATIENT MESSAGE (OUTPATIENT)
Dept: OTOLARYNGOLOGY | Facility: CLINIC | Age: 56
End: 2021-12-29

## 2021-12-31 NOTE — TELEPHONE ENCOUNTER
From: [place]  To: Marie Machuca.  Yvette Hazel MD  Sent: 12/29/2021 9:26 AM CST  Subject: Question regarding CT Scan Face    Please could you send a letter if the final resolution of the CT Scan Face for my Dentist Lisa Aase   Thank you so much   Her email i

## 2022-01-03 ENCOUNTER — OFFICE VISIT (OUTPATIENT)
Dept: OTHER | Facility: HOSPITAL | Age: 57
End: 2022-01-03
Attending: EMERGENCY MEDICINE

## 2022-01-03 DIAGNOSIS — Z77.21 EXPOSURE TO BLOOD-BORNE PATHOGEN: Primary | ICD-10-CM

## 2022-01-03 LAB — HCV AB SERPL QL IA: NONREACTIVE

## 2022-01-03 PROCEDURE — 86803 HEPATITIS C AB TEST: CPT

## 2022-01-10 ENCOUNTER — LAB ENCOUNTER (OUTPATIENT)
Dept: LAB | Facility: HOSPITAL | Age: 57
End: 2022-01-10
Attending: INTERNAL MEDICINE
Payer: COMMERCIAL

## 2022-01-10 DIAGNOSIS — Z87.448 HISTORY OF LUPUS NEPHRITIS: ICD-10-CM

## 2022-01-10 DIAGNOSIS — Z51.81 THERAPEUTIC DRUG MONITORING: ICD-10-CM

## 2022-01-10 LAB
ALBUMIN SERPL-MCNC: 3.9 G/DL (ref 3.4–5)
ALT SERPL-CCNC: 25 U/L
ANION GAP SERPL CALC-SCNC: 4 MMOL/L (ref 0–18)
AST SERPL-CCNC: 15 U/L (ref 15–37)
BASOPHILS # BLD AUTO: 0.03 X10(3) UL (ref 0–0.2)
BASOPHILS NFR BLD AUTO: 0.9 %
BILIRUB UR QL: NEGATIVE
BUN BLD-MCNC: 13 MG/DL (ref 7–18)
BUN/CREAT SERPL: 21 (ref 10–20)
CALCIUM BLD-MCNC: 9.2 MG/DL (ref 8.5–10.1)
CHLORIDE SERPL-SCNC: 110 MMOL/L (ref 98–112)
CLARITY UR: CLEAR
CO2 SERPL-SCNC: 30 MMOL/L (ref 21–32)
COLOR UR: YELLOW
CREAT BLD-MCNC: 0.62 MG/DL
CREAT UR-SCNC: 166 MG/DL
CRP SERPL-MCNC: <0.29 MG/DL (ref ?–0.3)
DEPRECATED RDW RBC AUTO: 47.1 FL (ref 35.1–46.3)
EOSINOPHIL # BLD AUTO: 0.12 X10(3) UL (ref 0–0.7)
EOSINOPHIL NFR BLD AUTO: 3.7 %
ERYTHROCYTE [DISTWIDTH] IN BLOOD BY AUTOMATED COUNT: 13.5 % (ref 11–15)
ERYTHROCYTE [SEDIMENTATION RATE] IN BLOOD: 13 MM/HR
GLUCOSE BLD-MCNC: 89 MG/DL (ref 70–99)
GLUCOSE UR-MCNC: NEGATIVE MG/DL
HCT VFR BLD AUTO: 40.9 %
HGB BLD-MCNC: 13.2 G/DL
IMM GRANULOCYTES # BLD AUTO: 0.01 X10(3) UL (ref 0–1)
IMM GRANULOCYTES NFR BLD: 0.3 %
KETONES UR-MCNC: NEGATIVE MG/DL
LEUKOCYTE ESTERASE UR QL STRIP.AUTO: NEGATIVE
LYMPHOCYTES # BLD AUTO: 1.2 X10(3) UL (ref 1–4)
LYMPHOCYTES NFR BLD AUTO: 37.3 %
MCH RBC QN AUTO: 30.3 PG (ref 26–34)
MCHC RBC AUTO-ENTMCNC: 32.3 G/DL (ref 31–37)
MCV RBC AUTO: 93.8 FL
MICROALBUMIN UR-MCNC: 5.14 MG/DL
MICROALBUMIN/CREAT 24H UR-RTO: 31 UG/MG (ref ?–30)
MONOCYTES # BLD AUTO: 0.26 X10(3) UL (ref 0.1–1)
MONOCYTES NFR BLD AUTO: 8.1 %
NEUTROPHILS # BLD AUTO: 1.6 X10 (3) UL (ref 1.5–7.7)
NEUTROPHILS # BLD AUTO: 1.6 X10(3) UL (ref 1.5–7.7)
NEUTROPHILS NFR BLD AUTO: 49.7 %
NITRITE UR QL STRIP.AUTO: NEGATIVE
OSMOLALITY SERPL CALC.SUM OF ELEC: 298 MOSM/KG (ref 275–295)
PH UR: 5 [PH] (ref 5–8)
PHOSPHATE SERPL-MCNC: 4.2 MG/DL (ref 2.5–4.9)
PLATELET # BLD AUTO: 169 10(3)UL (ref 150–450)
POTASSIUM SERPL-SCNC: 4.1 MMOL/L (ref 3.5–5.1)
PROT UR-MCNC: NEGATIVE MG/DL
RBC # BLD AUTO: 4.36 X10(6)UL
SODIUM SERPL-SCNC: 144 MMOL/L (ref 136–145)
SP GR UR STRIP: 1.02 (ref 1–1.03)
UROBILINOGEN UR STRIP-ACNC: <2
WBC # BLD AUTO: 3.2 X10(3) UL (ref 4–11)

## 2022-01-10 PROCEDURE — 82570 ASSAY OF URINE CREATININE: CPT

## 2022-01-10 PROCEDURE — 82043 UR ALBUMIN QUANTITATIVE: CPT

## 2022-01-10 PROCEDURE — 86140 C-REACTIVE PROTEIN: CPT

## 2022-01-10 PROCEDURE — 85025 COMPLETE CBC W/AUTO DIFF WBC: CPT

## 2022-01-10 PROCEDURE — 36415 COLL VENOUS BLD VENIPUNCTURE: CPT

## 2022-01-10 PROCEDURE — 85652 RBC SED RATE AUTOMATED: CPT

## 2022-01-10 PROCEDURE — 81001 URINALYSIS AUTO W/SCOPE: CPT

## 2022-01-10 PROCEDURE — 80069 RENAL FUNCTION PANEL: CPT

## 2022-01-10 PROCEDURE — 84450 TRANSFERASE (AST) (SGOT): CPT

## 2022-01-10 PROCEDURE — 84460 ALANINE AMINO (ALT) (SGPT): CPT

## 2022-01-12 ENCOUNTER — OFFICE VISIT (OUTPATIENT)
Dept: NEPHROLOGY | Facility: CLINIC | Age: 57
End: 2022-01-12
Payer: COMMERCIAL

## 2022-01-12 VITALS
HEART RATE: 69 BPM | WEIGHT: 145 LBS | DIASTOLIC BLOOD PRESSURE: 64 MMHG | SYSTOLIC BLOOD PRESSURE: 128 MMHG | HEIGHT: 63 IN | BODY MASS INDEX: 25.69 KG/M2

## 2022-01-12 DIAGNOSIS — Z87.448 HISTORY OF LUPUS NEPHRITIS: Primary | ICD-10-CM

## 2022-01-12 PROCEDURE — 99214 OFFICE O/P EST MOD 30 MIN: CPT | Performed by: INTERNAL MEDICINE

## 2022-01-12 PROCEDURE — 3074F SYST BP LT 130 MM HG: CPT | Performed by: INTERNAL MEDICINE

## 2022-01-12 PROCEDURE — 3008F BODY MASS INDEX DOCD: CPT | Performed by: INTERNAL MEDICINE

## 2022-01-12 PROCEDURE — 3078F DIAST BP <80 MM HG: CPT | Performed by: INTERNAL MEDICINE

## 2022-01-12 NOTE — PROGRESS NOTES
01/12/22        Patient: Rl Laura   YOB: 1965   Date of Visit: 1/12/2022       Dear  Dr. Cristopher Urbano MD,      Thank you for referring Rl Laura to my practice. Please find my assessment and plan below.       As you know she is a 47-y

## 2022-02-01 ENCOUNTER — OFFICE VISIT (OUTPATIENT)
Dept: OTHER | Facility: HOSPITAL | Age: 57
End: 2022-02-01
Attending: EMERGENCY MEDICINE

## 2022-02-01 DIAGNOSIS — Z77.21 EXPOSURE TO BLOOD-BORNE PATHOGEN: Primary | ICD-10-CM

## 2022-02-01 LAB
HBV SURFACE AB SER QL: REACTIVE
HBV SURFACE AB SERPL IA-ACNC: >1000 MIU/ML

## 2022-02-01 PROCEDURE — 86706 HEP B SURFACE ANTIBODY: CPT

## 2022-03-29 ENCOUNTER — TELEPHONE (OUTPATIENT)
Dept: RHEUMATOLOGY | Facility: CLINIC | Age: 57
End: 2022-03-29

## 2022-03-29 NOTE — TELEPHONE ENCOUNTER
Called and left a VM to patient in [facility]. Inform patient she can complete standing orders at her convenience. There is Standing Lab Orders. Please call office if any concerns.

## 2022-04-19 ENCOUNTER — OFFICE VISIT (OUTPATIENT)
Dept: FAMILY MEDICINE CLINIC | Facility: CLINIC | Age: 57
End: 2022-04-19
Payer: COMMERCIAL

## 2022-04-19 VITALS
HEIGHT: 63 IN | HEART RATE: 67 BPM | SYSTOLIC BLOOD PRESSURE: 104 MMHG | DIASTOLIC BLOOD PRESSURE: 65 MMHG | BODY MASS INDEX: 25.69 KG/M2 | WEIGHT: 145 LBS

## 2022-04-19 DIAGNOSIS — R22.0 FACIAL SWELLING: ICD-10-CM

## 2022-04-19 DIAGNOSIS — Z12.31 BREAST CANCER SCREENING BY MAMMOGRAM: Primary | ICD-10-CM

## 2022-04-19 DIAGNOSIS — R73.03 PREDIABETES: ICD-10-CM

## 2022-04-19 DIAGNOSIS — B35.1 ONYCHOMYCOSIS: ICD-10-CM

## 2022-04-19 PROCEDURE — 3008F BODY MASS INDEX DOCD: CPT | Performed by: FAMILY MEDICINE

## 2022-04-19 PROCEDURE — 3078F DIAST BP <80 MM HG: CPT | Performed by: FAMILY MEDICINE

## 2022-04-19 PROCEDURE — 3074F SYST BP LT 130 MM HG: CPT | Performed by: FAMILY MEDICINE

## 2022-04-19 PROCEDURE — 99214 OFFICE O/P EST MOD 30 MIN: CPT | Performed by: FAMILY MEDICINE

## 2022-04-19 RX ORDER — TERBINAFINE HYDROCHLORIDE 250 MG/1
TABLET ORAL
Qty: 21 TABLET | Refills: 0 | Status: SHIPPED | OUTPATIENT
Start: 2022-04-19

## 2022-04-21 NOTE — PROGRESS NOTES
Completed.   Placed in PSC box.     Summer Lizarraga RN BSN PHN       LOWER EXTREMITY EVALUATION:   Referring Physician: Dr. Laya Tuttle  Diagnosis:  Chronic pain of both knees (M25.561,M25.562,G89.29)      Date of Onset: 3/3/2017 Date of Service: 6/12/2017     PATIENT SUMMARY   Marci Machuca is a 49year old y/o female wh cm, right knee 5cm above 40 cm  AROM: right knee flexion 125 degrees (painful), right knee extension -1 deg  Left left knee flexion 140 degrees, left knee extension 0 deg  PROM: decreased right hip external rotation  Accessory motion: patellar mobility WNL [phone].  If you have any questions, please contact me at Dept: [phone]    Sincerely,  Electronically signed by therapist: Viola Castaneda PT    Physician's certification required: Yes  I certify the need for these services furnished under this uriel

## 2022-04-26 ENCOUNTER — TELEPHONE (OUTPATIENT)
Dept: ADMINISTRATIVE | Age: 57
End: 2022-04-26

## 2022-04-26 ENCOUNTER — LAB ENCOUNTER (OUTPATIENT)
Dept: LAB | Facility: HOSPITAL | Age: 57
End: 2022-04-26
Attending: FAMILY MEDICINE
Payer: COMMERCIAL

## 2022-04-26 DIAGNOSIS — R73.03 PREDIABETES: ICD-10-CM

## 2022-04-26 DIAGNOSIS — Z51.81 THERAPEUTIC DRUG MONITORING: ICD-10-CM

## 2022-04-26 LAB
ALT SERPL-CCNC: 32 U/L
AST SERPL-CCNC: 25 U/L (ref 15–37)
BASOPHILS # BLD AUTO: 0.02 X10(3) UL (ref 0–0.2)
BASOPHILS NFR BLD AUTO: 0.6 %
BILIRUB UR QL: NEGATIVE
CHOLEST SERPL-MCNC: 171 MG/DL (ref ?–200)
CLARITY UR: CLEAR
COLOR UR: YELLOW
CREAT BLD-MCNC: 0.59 MG/DL
CREAT UR-SCNC: 24.9 MG/DL
CRP SERPL-MCNC: <0.29 MG/DL (ref ?–0.3)
DEPRECATED RDW RBC AUTO: 47.4 FL (ref 35.1–46.3)
EOSINOPHIL # BLD AUTO: 0.12 X10(3) UL (ref 0–0.7)
EOSINOPHIL NFR BLD AUTO: 3.5 %
ERYTHROCYTE [DISTWIDTH] IN BLOOD BY AUTOMATED COUNT: 13.5 % (ref 11–15)
ERYTHROCYTE [SEDIMENTATION RATE] IN BLOOD: 9 MM/HR
EST. AVERAGE GLUCOSE BLD GHB EST-MCNC: 117 MG/DL (ref 68–126)
FASTING PATIENT LIPID ANSWER: YES
GLUCOSE UR-MCNC: NEGATIVE MG/DL
HBA1C MFR BLD: 5.7 % (ref ?–5.7)
HCT VFR BLD AUTO: 41.3 %
HDLC SERPL-MCNC: 97 MG/DL (ref 40–59)
HGB BLD-MCNC: 13.1 G/DL
IMM GRANULOCYTES # BLD AUTO: 0 X10(3) UL (ref 0–1)
IMM GRANULOCYTES NFR BLD: 0 %
KETONES UR-MCNC: NEGATIVE MG/DL
LDLC SERPL CALC-MCNC: 63 MG/DL (ref ?–100)
LYMPHOCYTES # BLD AUTO: 1.27 X10(3) UL (ref 1–4)
LYMPHOCYTES NFR BLD AUTO: 37.1 %
MCH RBC QN AUTO: 30 PG (ref 26–34)
MCHC RBC AUTO-ENTMCNC: 31.7 G/DL (ref 31–37)
MCV RBC AUTO: 94.5 FL
MICROALBUMIN UR-MCNC: 1.04 MG/DL
MICROALBUMIN/CREAT 24H UR-RTO: 41.8 UG/MG (ref ?–30)
MONOCYTES # BLD AUTO: 0.34 X10(3) UL (ref 0.1–1)
MONOCYTES NFR BLD AUTO: 9.9 %
NEUTROPHILS # BLD AUTO: 1.67 X10 (3) UL (ref 1.5–7.7)
NEUTROPHILS # BLD AUTO: 1.67 X10(3) UL (ref 1.5–7.7)
NEUTROPHILS NFR BLD AUTO: 48.9 %
NITRITE UR QL STRIP.AUTO: NEGATIVE
NONHDLC SERPL-MCNC: 74 MG/DL (ref ?–130)
PH UR: 7 [PH] (ref 5–8)
PLATELET # BLD AUTO: 169 10(3)UL (ref 150–450)
PROT UR-MCNC: NEGATIVE MG/DL
RBC # BLD AUTO: 4.37 X10(6)UL
SP GR UR STRIP: 1.01 (ref 1–1.03)
TRIGL SERPL-MCNC: 56 MG/DL (ref 30–149)
UROBILINOGEN UR STRIP-ACNC: <2
VIT C UR-MCNC: NEGATIVE MG/DL
VLDLC SERPL CALC-MCNC: 8 MG/DL (ref 0–30)
WBC # BLD AUTO: 3.4 X10(3) UL (ref 4–11)

## 2022-04-26 PROCEDURE — 85025 COMPLETE CBC W/AUTO DIFF WBC: CPT

## 2022-04-26 PROCEDURE — 84460 ALANINE AMINO (ALT) (SGPT): CPT

## 2022-04-26 PROCEDURE — 82565 ASSAY OF CREATININE: CPT

## 2022-04-26 PROCEDURE — 82570 ASSAY OF URINE CREATININE: CPT

## 2022-04-26 PROCEDURE — 81001 URINALYSIS AUTO W/SCOPE: CPT

## 2022-04-26 PROCEDURE — 80061 LIPID PANEL: CPT

## 2022-04-26 PROCEDURE — 36415 COLL VENOUS BLD VENIPUNCTURE: CPT

## 2022-04-26 PROCEDURE — 83036 HEMOGLOBIN GLYCOSYLATED A1C: CPT

## 2022-04-26 PROCEDURE — 85652 RBC SED RATE AUTOMATED: CPT

## 2022-04-26 PROCEDURE — 82043 UR ALBUMIN QUANTITATIVE: CPT

## 2022-04-26 PROCEDURE — 86140 C-REACTIVE PROTEIN: CPT

## 2022-04-26 PROCEDURE — 84450 TRANSFERASE (AST) (SGOT): CPT

## 2022-04-26 NOTE — TELEPHONE ENCOUNTER
Patient called to see if we rec'd form from [place]. Looked through IB and found. She will be dropping off add'l papers to go along with FMLA recert. All the same info as before.

## 2022-04-28 ENCOUNTER — HOSPITAL ENCOUNTER (OUTPATIENT)
Dept: MAMMOGRAPHY | Facility: HOSPITAL | Age: 57
Discharge: HOME | End: 2022-04-28
Attending: FAMILY MEDICINE
Payer: COMMERCIAL

## 2022-04-28 DIAGNOSIS — Z12.31 BREAST CANCER SCREENING BY MAMMOGRAM: ICD-10-CM

## 2022-04-28 PROCEDURE — 77063 BREAST TOMOSYNTHESIS BI: CPT | Performed by: FAMILY MEDICINE

## 2022-04-28 PROCEDURE — 77067 SCR MAMMO BI INCL CAD: CPT | Performed by: FAMILY MEDICINE

## 2022-05-02 NOTE — TELEPHONE ENCOUNTER
Dr. Antoine Rao,     Please sign off on form: Re cert FMLA, all information same as previous  -Highlight the patient and hit \"Chart\" button. -In Chart Review, w/in the Encounter tab - click 1 time on the Telephone call encounter for 4/26/22 Scroll down the telephone encounter.  -Click \"scan on\" blue Hyperlink under \"Media\" heading for FMLA Dr Antoine Rao 4/29/22  w/in the telephone enc.  -Click on Acknowledge button at the bottom right corner and left-click onto image, signature stamp appears and drag signature to Provider signature line. Stamp will turn blue. Close window.      Thank you,    Ezio Braga

## 2022-07-15 ENCOUNTER — LAB ENCOUNTER (OUTPATIENT)
Dept: LAB | Facility: HOSPITAL | Age: 57
End: 2022-07-15
Attending: INTERNAL MEDICINE
Payer: COMMERCIAL

## 2022-07-15 DIAGNOSIS — Z51.81 THERAPEUTIC DRUG MONITORING: ICD-10-CM

## 2022-07-15 LAB
ALT SERPL-CCNC: 24 U/L
AST SERPL-CCNC: 23 U/L (ref 15–37)
BASOPHILS # BLD AUTO: 0.03 X10(3) UL (ref 0–0.2)
BASOPHILS NFR BLD AUTO: 1 %
BILIRUB UR QL: NEGATIVE
CLARITY UR: CLEAR
COLOR UR: YELLOW
CREAT BLD-MCNC: 0.54 MG/DL
CREAT UR-SCNC: 80.6 MG/DL
CRP SERPL-MCNC: <0.29 MG/DL (ref ?–0.3)
DEPRECATED RDW RBC AUTO: 46.7 FL (ref 35.1–46.3)
EOSINOPHIL # BLD AUTO: 0.18 X10(3) UL (ref 0–0.7)
EOSINOPHIL NFR BLD AUTO: 5.7 %
ERYTHROCYTE [DISTWIDTH] IN BLOOD BY AUTOMATED COUNT: 13.5 % (ref 11–15)
ERYTHROCYTE [SEDIMENTATION RATE] IN BLOOD: 8 MM/HR
GLUCOSE UR-MCNC: NEGATIVE MG/DL
HCT VFR BLD AUTO: 41.5 %
HGB BLD-MCNC: 13.4 G/DL
IMM GRANULOCYTES # BLD AUTO: 0.01 X10(3) UL (ref 0–1)
IMM GRANULOCYTES NFR BLD: 0.3 %
KETONES UR-MCNC: NEGATIVE MG/DL
LEUKOCYTE ESTERASE UR QL STRIP.AUTO: NEGATIVE
LYMPHOCYTES # BLD AUTO: 1.3 X10(3) UL (ref 1–4)
LYMPHOCYTES NFR BLD AUTO: 41.4 %
MCH RBC QN AUTO: 30.3 PG (ref 26–34)
MCHC RBC AUTO-ENTMCNC: 32.3 G/DL (ref 31–37)
MCV RBC AUTO: 93.9 FL
MICROALBUMIN UR-MCNC: 1.91 MG/DL
MICROALBUMIN/CREAT 24H UR-RTO: 23.7 UG/MG (ref ?–30)
MONOCYTES # BLD AUTO: 0.24 X10(3) UL (ref 0.1–1)
MONOCYTES NFR BLD AUTO: 7.6 %
NEUTROPHILS # BLD AUTO: 1.38 X10 (3) UL (ref 1.5–7.7)
NEUTROPHILS # BLD AUTO: 1.38 X10(3) UL (ref 1.5–7.7)
NEUTROPHILS NFR BLD AUTO: 44 %
NITRITE UR QL STRIP.AUTO: NEGATIVE
PH UR: 5.5 [PH] (ref 5–8)
PLATELET # BLD AUTO: 165 10(3)UL (ref 150–450)
PROT UR-MCNC: NEGATIVE MG/DL
RBC # BLD AUTO: 4.42 X10(6)UL
SP GR UR STRIP: 1.02 (ref 1–1.03)
UROBILINOGEN UR STRIP-ACNC: 0.2
WBC # BLD AUTO: 3.1 X10(3) UL (ref 4–11)

## 2022-07-15 PROCEDURE — 82565 ASSAY OF CREATININE: CPT

## 2022-07-15 PROCEDURE — 84460 ALANINE AMINO (ALT) (SGPT): CPT

## 2022-07-15 PROCEDURE — 81001 URINALYSIS AUTO W/SCOPE: CPT

## 2022-07-15 PROCEDURE — 36415 COLL VENOUS BLD VENIPUNCTURE: CPT

## 2022-07-15 PROCEDURE — 84450 TRANSFERASE (AST) (SGOT): CPT

## 2022-07-15 PROCEDURE — 85652 RBC SED RATE AUTOMATED: CPT

## 2022-07-15 PROCEDURE — 85025 COMPLETE CBC W/AUTO DIFF WBC: CPT

## 2022-07-15 PROCEDURE — 82570 ASSAY OF URINE CREATININE: CPT

## 2022-07-15 PROCEDURE — 82043 UR ALBUMIN QUANTITATIVE: CPT

## 2022-07-15 PROCEDURE — 81015 MICROSCOPIC EXAM OF URINE: CPT

## 2022-07-15 PROCEDURE — 86140 C-REACTIVE PROTEIN: CPT

## 2022-07-18 ENCOUNTER — OFFICE VISIT (OUTPATIENT)
Dept: RHEUMATOLOGY | Facility: CLINIC | Age: 57
End: 2022-07-18
Payer: COMMERCIAL

## 2022-07-18 ENCOUNTER — TELEPHONE (OUTPATIENT)
Dept: RHEUMATOLOGY | Facility: CLINIC | Age: 57
End: 2022-07-18

## 2022-07-18 VITALS
HEART RATE: 58 BPM | BODY MASS INDEX: 26.22 KG/M2 | WEIGHT: 148 LBS | DIASTOLIC BLOOD PRESSURE: 69 MMHG | SYSTOLIC BLOOD PRESSURE: 108 MMHG | RESPIRATION RATE: 16 BRPM | HEIGHT: 63 IN

## 2022-07-18 DIAGNOSIS — M32.9 SYSTEMIC LUPUS ERYTHEMATOSUS, UNSPECIFIED SLE TYPE, UNSPECIFIED ORGAN INVOLVEMENT STATUS (HCC): Primary | ICD-10-CM

## 2022-07-18 DIAGNOSIS — Z51.81 THERAPEUTIC DRUG MONITORING: ICD-10-CM

## 2022-07-18 PROCEDURE — 99214 OFFICE O/P EST MOD 30 MIN: CPT | Performed by: INTERNAL MEDICINE

## 2022-07-18 PROCEDURE — 3074F SYST BP LT 130 MM HG: CPT | Performed by: INTERNAL MEDICINE

## 2022-07-18 PROCEDURE — 3008F BODY MASS INDEX DOCD: CPT | Performed by: INTERNAL MEDICINE

## 2022-07-18 PROCEDURE — 3078F DIAST BP <80 MM HG: CPT | Performed by: INTERNAL MEDICINE

## 2022-07-18 RX ORDER — HYDROXYCHLOROQUINE SULFATE 200 MG/1
200 TABLET, FILM COATED ORAL 2 TIMES DAILY
Qty: 180 TABLET | Refills: 3 | Status: SHIPPED | OUTPATIENT
Start: 2022-07-18

## 2022-07-18 RX ORDER — BELIMUMAB 200 MG/ML
200 SOLUTION SUBCUTANEOUS WEEKLY
Qty: 4 EACH | Refills: 5 | Status: SHIPPED | OUTPATIENT
Start: 2022-07-18 | End: 2022-07-18 | Stop reason: CLARIF

## 2022-07-18 NOTE — PATIENT INSTRUCTIONS
1.  Try tyelnol -arthritis  first - for hands and lower back pain and upper back pain   2.. Cont. hydroxychlrqouine 200mg twice a day    3. Check labs every 3 months. 4. Return to clinic in 6 months -   5. Tylenol arthirits as needed.

## 2022-07-19 NOTE — TELEPHONE ENCOUNTER
[facility] contacted, spoke with Terry Hernandes and order cancelled for NEILHUA CARBALL[facility].

## 2022-07-28 ENCOUNTER — LAB ENCOUNTER (OUTPATIENT)
Dept: LAB | Facility: HOSPITAL | Age: 57
End: 2022-07-28
Attending: PREVENTIVE MEDICINE
Payer: COMMERCIAL

## 2022-07-28 ENCOUNTER — TELEPHONE (OUTPATIENT)
Dept: INTERNAL MEDICINE CLINIC | Facility: HOSPITAL | Age: 57
End: 2022-07-28

## 2022-07-28 DIAGNOSIS — Z20.822 SUSPECTED COVID-19 VIRUS INFECTION: ICD-10-CM

## 2022-07-28 DIAGNOSIS — Z20.822 SUSPECTED COVID-19 VIRUS INFECTION: Primary | ICD-10-CM

## 2022-07-28 LAB — SARS-COV-2 RNA RESP QL NAA+PROBE: DETECTED

## 2022-07-28 NOTE — TELEPHONE ENCOUNTER
Results and RTW guidelines:    COVID RESULT:    [x] Viewed by employee in [address] RTW plan and instructions as indicated on triage call. Manager notified. Estimated RTW date: 8/1  [x] Discussed with employee   [] Unable to reach by phone. Sent via [employer] message      Test type:    [x] Rapid         [] Alinity         [] Outside test:       [x] Positive  Is employee unvaccinated? Yes []   No [x]          If yes:  Enter Covid Positive Medical exemption on Exemption Spreadsheet    Did you have close contact with someone on your unit while not wearing a mask? (e.g., during meal breaks):  Yes []   No []     If yes, who:     - Employee should quarantine at home for at least 5 days (day 1 is day after sx onset) , follow the CDC guidelines for cleaning and                              quarantining; see CDC.gov   -This employee may RTW on day 6 if asymptomatic or mildly symptomatic (with improving symptoms). Call Employee Health on day 5 if unable to return on day 6 after                      symptom onset.    -This employee needs to call [employer] on day 5 after symptom onset. The employee needs to be cleared by Employee "[employer] - Monitor symptoms and temperature                 - Notify PCP of result                 - Seek emergent care with worsening symptoms   - If employee is still experiencing severe symptoms on day 5 must make a RTW appt with [employer], Employee will not be cleared if:    1. Has consistent cough, shortness of breath or fatigue that restricts your physical activities    2. Is still feeling \"unwell\"    3. Within 15 days of hospitalization for COVID    4. Within 20 days of intubation for COVID    5.  Still has a fever, vomiting or diarrhea   - Keep communication open with management about RTW and if symptoms worsen                - If outside testing completed, bring a copy of result to RTW appointment           Notes:     RTW PLAN:    [x]  If COVID positive results, off work minimum of 5 days from positive test or onset of symptoms (day 0)        On day 5, if asymptomatic or mildly symptomatic (with improving symptoms) may return to work day 6          On day 5, if symptomatic, call Employee Health for RTW screening        []  COVID positive result - call Employee Health on day 5 after symptom onset. The employee needs to be cleared by Employee Health to RTW. [] RTW immediately, continue to monitor for sx  [] RTW when sx improve; must be fever free for 24 hours w/o medications, Diarrhea/Vomiting free for 24 hours w/o medications  [] Alinity ordered; continue to monitor sx and call for new/worsening sx.   Discuss RTW guidelines with manager  [] May continue to work  [] Follow up with PCP  [] Home until further instruction from hotline with Alinity results  INSTRUCTIONS PROVIDED:  [x]  Plan as noted above  []  Length of time to obtain results   [x]  Quarantine instructions  [x]  Masking protocol   [x]  S/S of worsening infection/condition and importance of prompt medical re-evaluation including when to seek emergency care  [] If symptoms develop, stay home and call hotline for rapid test order    Estimated RTW date:  8/1    [x] The employee voiced understanding of above plan/instructions  [x] Manager Notified

## 2022-07-29 ENCOUNTER — TELEPHONE (OUTPATIENT)
Dept: RHEUMATOLOGY | Facility: CLINIC | Age: 57
End: 2022-07-29

## 2022-07-29 RX ORDER — NIRMATRELVIR AND RITONAVIR 300-100 MG
KIT ORAL
Qty: 30 TABLET | Refills: 0 | Status: SHIPPED | OUTPATIENT
Start: 2022-07-29 | End: 2022-08-03

## 2022-07-29 NOTE — TELEPHONE ENCOUNTER
Pt states that she is positive for covid. Pt states that she does have a headache, congestion and mucus. Pt would like to know what the doctor recommends for her to do or take. Pt would like a return call in [address] [facility]

## 2022-07-29 NOTE — TELEPHONE ENCOUNTER
Called patient using [facility] [employer] [facility],  [phone] [facility] [place] [id number]. Patient name and  verified. She was informed of below. All her questions were answered.

## 2022-07-30 ENCOUNTER — TELEPHONE (OUTPATIENT)
Dept: RHEUMATOLOGY | Facility: CLINIC | Age: 57
End: 2022-07-30

## 2022-08-01 NOTE — TELEPHONE ENCOUNTER
Called patient using [address], [address] [place] [id number]. Patient name and  verified. Patient states after striating paxlovid she was having stomach irritation, pain and acid reflex symptoms. She stopped taking and was feeling better. She still has mild covid symptoms. She was advised to reach out to her PCP for further evaluation. Please advise any other recommendations.

## 2022-10-24 ENCOUNTER — IMMUNIZATION (OUTPATIENT)
Dept: LAB | Facility: HOSPITAL | Age: 57
End: 2022-10-24
Attending: PREVENTIVE MEDICINE
Payer: COMMERCIAL

## 2022-10-24 DIAGNOSIS — Z23 NEED FOR VACCINATION: Primary | ICD-10-CM

## 2022-10-24 PROCEDURE — 90471 IMMUNIZATION ADMIN: CPT

## 2022-10-31 ENCOUNTER — TELEPHONE (OUTPATIENT)
Dept: ADMINISTRATIVE | Age: 57
End: 2022-10-31

## 2022-10-31 NOTE — TELEPHONE ENCOUNTER
Dr. Naomy Tim     Please sign off on form: FMLA  -Highlight the patient and hit \"Chart\" button. -In Chart Review, w/in the Encounter tab - click 1 time on the Telephone call encounter for 10/31/22 Scroll down the telephone encounter.  -Click \"scan on\" blue Hyperlink under \"Media\" heading for FMLA Dr. Naomy Tim 10/31/22  w/in the telephone enc.  -Click on Acknowledge button at the bottom right corner and left-click onto image, signature stamp appears and drag signature to Provider signature line. Stamp will turn blue. Close window.      Thank you,  [facility]

## 2022-11-01 ENCOUNTER — OFFICE VISIT (OUTPATIENT)
Dept: FAMILY MEDICINE CLINIC | Facility: CLINIC | Age: 57
End: 2022-11-01
Payer: COMMERCIAL

## 2022-11-01 VITALS
WEIGHT: 144.19 LBS | TEMPERATURE: 98 F | DIASTOLIC BLOOD PRESSURE: 59 MMHG | HEART RATE: 70 BPM | HEIGHT: 63 IN | BODY MASS INDEX: 25.55 KG/M2 | SYSTOLIC BLOOD PRESSURE: 104 MMHG

## 2022-11-01 DIAGNOSIS — H57.10 PAIN IN EYE, UNSPECIFIED LATERALITY: Primary | ICD-10-CM

## 2022-11-01 PROCEDURE — 3078F DIAST BP <80 MM HG: CPT | Performed by: FAMILY MEDICINE

## 2022-11-01 PROCEDURE — 3074F SYST BP LT 130 MM HG: CPT | Performed by: FAMILY MEDICINE

## 2022-11-01 PROCEDURE — 99214 OFFICE O/P EST MOD 30 MIN: CPT | Performed by: FAMILY MEDICINE

## 2022-11-01 PROCEDURE — 3008F BODY MASS INDEX DOCD: CPT | Performed by: FAMILY MEDICINE

## 2022-11-01 NOTE — TELEPHONE ENCOUNTER
Forms completed and faxed to [facility] at # [phone] confirmation rcrohini'maria alejandra. Writer also spoke with pt and advised forms had been faxed.

## 2022-11-04 ENCOUNTER — LAB ENCOUNTER (OUTPATIENT)
Dept: LAB | Facility: HOSPITAL | Age: 57
End: 2022-11-04
Attending: INTERNAL MEDICINE
Payer: COMMERCIAL

## 2022-11-04 DIAGNOSIS — M32.9 SYSTEMIC LUPUS ERYTHEMATOSUS, UNSPECIFIED SLE TYPE, UNSPECIFIED ORGAN INVOLVEMENT STATUS (HCC): ICD-10-CM

## 2022-11-04 DIAGNOSIS — Z51.81 THERAPEUTIC DRUG MONITORING: ICD-10-CM

## 2022-11-04 LAB
ALT SERPL-CCNC: 21 U/L
AST SERPL-CCNC: 17 U/L (ref 15–37)
BILIRUB UR QL: NEGATIVE
CLARITY UR: CLEAR
COLOR UR: YELLOW
CREAT BLD-MCNC: 0.65 MG/DL
CREAT UR-SCNC: 185 MG/DL
CRP SERPL-MCNC: <0.29 MG/DL (ref ?–0.3)
DEPRECATED RDW RBC AUTO: 47.8 FL (ref 35.1–46.3)
ERYTHROCYTE [DISTWIDTH] IN BLOOD BY AUTOMATED COUNT: 13.6 % (ref 11–15)
ERYTHROCYTE [SEDIMENTATION RATE] IN BLOOD: 11 MM/HR
GFR SERPLBLD BASED ON 1.73 SQ M-ARVRAT: 104 ML/MIN/1.73M2 (ref 60–?)
GLUCOSE UR-MCNC: NEGATIVE MG/DL
HCT VFR BLD AUTO: 40.6 %
HGB BLD-MCNC: 13 G/DL
KETONES UR-MCNC: NEGATIVE MG/DL
LEUKOCYTE ESTERASE UR QL STRIP.AUTO: NEGATIVE
MCH RBC QN AUTO: 30.5 PG (ref 26–34)
MCHC RBC AUTO-ENTMCNC: 32 G/DL (ref 31–37)
MCV RBC AUTO: 95.3 FL
MICROALBUMIN UR-MCNC: 2.3 MG/DL
MICROALBUMIN/CREAT 24H UR-RTO: 12.4 UG/MG (ref ?–30)
NITRITE UR QL STRIP.AUTO: NEGATIVE
PH UR: 5 [PH] (ref 5–8)
PLATELET # BLD AUTO: 172 10(3)UL (ref 150–450)
PROT UR-MCNC: 30 MG/DL
RBC # BLD AUTO: 4.26 X10(6)UL
SP GR UR STRIP: 1.02 (ref 1–1.03)
UROBILINOGEN UR STRIP-ACNC: <2
VIT C UR-MCNC: NEGATIVE MG/DL
WBC # BLD AUTO: 3.5 X10(3) UL (ref 4–11)

## 2022-11-04 PROCEDURE — 81001 URINALYSIS AUTO W/SCOPE: CPT

## 2022-11-04 PROCEDURE — 85027 COMPLETE CBC AUTOMATED: CPT

## 2022-11-04 PROCEDURE — 36415 COLL VENOUS BLD VENIPUNCTURE: CPT

## 2022-11-04 PROCEDURE — 84460 ALANINE AMINO (ALT) (SGPT): CPT

## 2022-11-04 PROCEDURE — 82565 ASSAY OF CREATININE: CPT

## 2022-11-04 PROCEDURE — 86140 C-REACTIVE PROTEIN: CPT

## 2022-11-04 PROCEDURE — 86225 DNA ANTIBODY NATIVE: CPT

## 2022-11-04 PROCEDURE — 82043 UR ALBUMIN QUANTITATIVE: CPT

## 2022-11-04 PROCEDURE — 82570 ASSAY OF URINE CREATININE: CPT

## 2022-11-04 PROCEDURE — 85652 RBC SED RATE AUTOMATED: CPT

## 2022-11-04 PROCEDURE — 84450 TRANSFERASE (AST) (SGOT): CPT

## 2022-11-08 LAB — DSDNA IGG SERPL IA-ACNC: 13.6 IU/ML

## 2022-11-21 NOTE — TELEPHONE ENCOUNTER
Pt called stated a section was missing dates, forms were revised and faxed to [employer] at [phone].

## 2022-11-21 NOTE — TELEPHONE ENCOUNTER
Pt on the phone stating Dr. Larry Oconnor has not completed the FMLA forms. Pt spoke with Forms department and was informed they are waiting for Dr. Larry Oconnor to complete these forms.  Please advise

## 2022-12-20 ENCOUNTER — OFFICE VISIT (OUTPATIENT)
Dept: OPHTHALMOLOGY | Facility: CLINIC | Age: 57
End: 2022-12-20
Payer: COMMERCIAL

## 2022-12-20 DIAGNOSIS — H25.13 AGE-RELATED NUCLEAR CATARACT OF BOTH EYES: Primary | ICD-10-CM

## 2022-12-20 DIAGNOSIS — R68.89 ITCHY EYES: ICD-10-CM

## 2022-12-20 DIAGNOSIS — Z79.899 LONG-TERM USE OF PLAQUENIL: ICD-10-CM

## 2022-12-20 PROBLEM — H57.9 ITCHY EYES: Status: ACTIVE | Noted: 2022-12-20

## 2022-12-20 PROCEDURE — 92014 COMPRE OPH EXAM EST PT 1/>: CPT | Performed by: OPHTHALMOLOGY

## 2022-12-20 NOTE — ASSESSMENT & PLAN NOTE
No evidence of plaquenil toxicity in either eye. Will follow in 1 year for a plaquenil eye exam based on current guidelines.    Patient is approved to continue on plaquenil as directed by their PCP or rheumatologist.    Will see patient in 1 year for a plaquenil EE

## 2022-12-20 NOTE — PATIENT INSTRUCTIONS
Long-term use of Plaquenil   No evidence of plaquenil toxicity in either eye. Will follow in 1 year for a plaquenil eye exam based on current guidelines. Patient is approved to continue on plaquenil as directed by their PCP or rheumatologist.    Will see patient in 1 year for a plaquenil EE    Age-related nuclear cataract of both eyes  Discussed early cataracts with patient. No treatment recommended at this time. Continue with same glasses    Itchy eyes  Use over the counter Alaway or Zaditor twice daily as needed for itching/ allergy symptoms. Info given.      Recommend warm compresses 2 times a day

## 2022-12-20 NOTE — ASSESSMENT & PLAN NOTE
Use over the counter Alaway or Zaditor twice daily as needed for itching/ allergy symptoms. Info given.      Recommend warm compresses 2 times a day

## 2022-12-20 NOTE — ASSESSMENT & PLAN NOTE
Discussed early cataracts with patient. No treatment recommended at this time.      Continue with same glasses

## 2022-12-28 ENCOUNTER — NURSE TRIAGE (OUTPATIENT)
Dept: FAMILY MEDICINE CLINIC | Facility: CLINIC | Age: 57
End: 2022-12-28

## 2022-12-28 ENCOUNTER — OFFICE VISIT (OUTPATIENT)
Dept: FAMILY MEDICINE CLINIC | Facility: CLINIC | Age: 57
End: 2022-12-28
Payer: COMMERCIAL

## 2022-12-28 VITALS
WEIGHT: 148 LBS | OXYGEN SATURATION: 97 % | SYSTOLIC BLOOD PRESSURE: 126 MMHG | BODY MASS INDEX: 26.22 KG/M2 | HEART RATE: 72 BPM | RESPIRATION RATE: 18 BRPM | DIASTOLIC BLOOD PRESSURE: 86 MMHG | HEIGHT: 63 IN | TEMPERATURE: 97 F

## 2022-12-28 DIAGNOSIS — J06.9 VIRAL URI WITH COUGH: Primary | ICD-10-CM

## 2022-12-28 LAB
CONTROL LINE PRESENT WITH A CLEAR BACKGROUND (YES/NO): YES YES/NO
KIT EXPIRATION DATE: NORMAL DATE
STREP GRP A CUL-SCR: NEGATIVE

## 2022-12-28 PROCEDURE — 99202 OFFICE O/P NEW SF 15 MIN: CPT | Performed by: NURSE PRACTITIONER

## 2022-12-28 PROCEDURE — 3008F BODY MASS INDEX DOCD: CPT | Performed by: NURSE PRACTITIONER

## 2022-12-28 PROCEDURE — 3074F SYST BP LT 130 MM HG: CPT | Performed by: NURSE PRACTITIONER

## 2022-12-28 PROCEDURE — 3079F DIAST BP 80-89 MM HG: CPT | Performed by: NURSE PRACTITIONER

## 2022-12-28 PROCEDURE — 87880 STREP A ASSAY W/OPTIC: CPT | Performed by: NURSE PRACTITIONER

## 2022-12-28 PROCEDURE — 87637 SARSCOV2&INF A&B&RSV AMP PRB: CPT | Performed by: NURSE PRACTITIONER

## 2022-12-29 LAB
FLUAV + FLUBV RNA SPEC NAA+PROBE: NOT DETECTED
FLUAV + FLUBV RNA SPEC NAA+PROBE: NOT DETECTED
RSV RNA SPEC NAA+PROBE: NOT DETECTED
SARS-COV-2 RNA RESP QL NAA+PROBE: NOT DETECTED

## 2023-04-07 ENCOUNTER — LAB ENCOUNTER (OUTPATIENT)
Dept: LAB | Facility: HOSPITAL | Age: 58
End: 2023-04-07
Attending: INTERNAL MEDICINE
Payer: COMMERCIAL

## 2023-04-07 DIAGNOSIS — M32.9 SYSTEMIC LUPUS ERYTHEMATOSUS, UNSPECIFIED SLE TYPE, UNSPECIFIED ORGAN INVOLVEMENT STATUS (HCC): ICD-10-CM

## 2023-04-07 DIAGNOSIS — Z51.81 THERAPEUTIC DRUG MONITORING: ICD-10-CM

## 2023-04-07 LAB
ALT SERPL-CCNC: 29 U/L
AST SERPL-CCNC: 22 U/L (ref 15–37)
BILIRUB UR QL: NEGATIVE
CLARITY UR: CLEAR
CREAT BLD-MCNC: 0.58 MG/DL
CREAT UR-SCNC: 78 MG/DL
CRP SERPL-MCNC: <0.29 MG/DL (ref ?–0.3)
DEPRECATED RDW RBC AUTO: 47.1 FL (ref 35.1–46.3)
ERYTHROCYTE [DISTWIDTH] IN BLOOD BY AUTOMATED COUNT: 13.7 % (ref 11–15)
ERYTHROCYTE [SEDIMENTATION RATE] IN BLOOD: 8 MM/HR
GFR SERPLBLD BASED ON 1.73 SQ M-ARVRAT: 106 ML/MIN/1.73M2 (ref 60–?)
GLUCOSE UR-MCNC: NORMAL MG/DL
HCT VFR BLD AUTO: 41.8 %
HGB BLD-MCNC: 13.4 G/DL
KETONES UR-MCNC: NEGATIVE MG/DL
LEUKOCYTE ESTERASE UR QL STRIP.AUTO: NEGATIVE
MCH RBC QN AUTO: 30 PG (ref 26–34)
MCHC RBC AUTO-ENTMCNC: 32.1 G/DL (ref 31–37)
MCV RBC AUTO: 93.5 FL
MICROALBUMIN UR-MCNC: 2.18 MG/DL
MICROALBUMIN/CREAT 24H UR-RTO: 27.9 UG/MG (ref ?–30)
NITRITE UR QL STRIP.AUTO: NEGATIVE
PH UR: 5.5 [PH] (ref 5–8)
PLATELET # BLD AUTO: 173 10(3)UL (ref 150–450)
PROT UR-MCNC: NEGATIVE MG/DL
RBC # BLD AUTO: 4.47 X10(6)UL
SP GR UR STRIP: 1.01 (ref 1–1.03)
UROBILINOGEN UR STRIP-ACNC: NORMAL
WBC # BLD AUTO: 3.2 X10(3) UL (ref 4–11)

## 2023-04-07 PROCEDURE — 84460 ALANINE AMINO (ALT) (SGPT): CPT

## 2023-04-07 PROCEDURE — 84450 TRANSFERASE (AST) (SGOT): CPT

## 2023-04-07 PROCEDURE — 81001 URINALYSIS AUTO W/SCOPE: CPT

## 2023-04-07 PROCEDURE — 85652 RBC SED RATE AUTOMATED: CPT

## 2023-04-07 PROCEDURE — 82565 ASSAY OF CREATININE: CPT

## 2023-04-07 PROCEDURE — 86225 DNA ANTIBODY NATIVE: CPT

## 2023-04-07 PROCEDURE — 85027 COMPLETE CBC AUTOMATED: CPT

## 2023-04-07 PROCEDURE — 86140 C-REACTIVE PROTEIN: CPT

## 2023-04-07 PROCEDURE — 82570 ASSAY OF URINE CREATININE: CPT

## 2023-04-07 PROCEDURE — 82043 UR ALBUMIN QUANTITATIVE: CPT

## 2023-04-07 PROCEDURE — 36415 COLL VENOUS BLD VENIPUNCTURE: CPT

## 2023-04-12 ENCOUNTER — OFFICE VISIT (OUTPATIENT)
Dept: RHEUMATOLOGY | Facility: CLINIC | Age: 58
End: 2023-04-12

## 2023-04-12 VITALS
HEIGHT: 63 IN | SYSTOLIC BLOOD PRESSURE: 107 MMHG | BODY MASS INDEX: 25.81 KG/M2 | HEART RATE: 57 BPM | RESPIRATION RATE: 16 BRPM | WEIGHT: 145.69 LBS | DIASTOLIC BLOOD PRESSURE: 62 MMHG

## 2023-04-12 DIAGNOSIS — M72.2 PLANTAR FASCIITIS, BILATERAL: ICD-10-CM

## 2023-04-12 DIAGNOSIS — M32.9 SYSTEMIC LUPUS ERYTHEMATOSUS, UNSPECIFIED SLE TYPE, UNSPECIFIED ORGAN INVOLVEMENT STATUS (HCC): Primary | ICD-10-CM

## 2023-04-12 DIAGNOSIS — Z51.81 THERAPEUTIC DRUG MONITORING: ICD-10-CM

## 2023-04-12 DIAGNOSIS — E55.9 VITAMIN D DEFICIENCY: ICD-10-CM

## 2023-04-12 LAB — DSDNA IGG SERPL IA-ACNC: 16.9 IU/ML

## 2023-04-12 PROCEDURE — 99214 OFFICE O/P EST MOD 30 MIN: CPT | Performed by: INTERNAL MEDICINE

## 2023-04-12 PROCEDURE — 3074F SYST BP LT 130 MM HG: CPT | Performed by: INTERNAL MEDICINE

## 2023-04-12 PROCEDURE — 3078F DIAST BP <80 MM HG: CPT | Performed by: INTERNAL MEDICINE

## 2023-04-12 PROCEDURE — 3008F BODY MASS INDEX DOCD: CPT | Performed by: INTERNAL MEDICINE

## 2023-04-12 RX ORDER — HYDROXYCHLOROQUINE SULFATE 200 MG/1
200 TABLET, FILM COATED ORAL 2 TIMES DAILY
Qty: 180 TABLET | Refills: 3 | Status: SHIPPED | OUTPATIENT
Start: 2023-04-12

## 2023-04-12 NOTE — PATIENT INSTRUCTIONS
1.  Try tyelnol -arthritis  first - for hands and lower back pain and upper back pain   2.. Cont. hydroxychlrqouine 200mg twice a day    3. Check labs every 6 months. 4. Return to clinic in 6 months -   5. Take fish oil and turmeric -  - try increasing omega 3 to 2000mg a day - to see if this helps the joint pain   6. Try turmeric   7.  Exercises for plantar fasciitis

## 2023-05-30 ENCOUNTER — TELEPHONE (OUTPATIENT)
Dept: FAMILY MEDICINE CLINIC | Facility: CLINIC | Age: 58
End: 2023-05-30

## 2023-05-30 DIAGNOSIS — Z12.31 ENCOUNTER FOR SCREENING MAMMOGRAM FOR MALIGNANT NEOPLASM OF BREAST: Primary | ICD-10-CM

## 2023-07-10 ENCOUNTER — HOSPITAL ENCOUNTER (OUTPATIENT)
Dept: MAMMOGRAPHY | Facility: HOSPITAL | Age: 58
Discharge: HOME | End: 2023-07-10
Attending: FAMILY MEDICINE
Payer: COMMERCIAL

## 2023-07-10 DIAGNOSIS — Z12.31 ENCOUNTER FOR SCREENING MAMMOGRAM FOR MALIGNANT NEOPLASM OF BREAST: ICD-10-CM

## 2023-07-10 PROCEDURE — 77063 BREAST TOMOSYNTHESIS BI: CPT | Performed by: FAMILY MEDICINE

## 2023-07-10 PROCEDURE — 77067 SCR MAMMO BI INCL CAD: CPT | Performed by: FAMILY MEDICINE

## 2023-10-26 ENCOUNTER — TELEPHONE (OUTPATIENT)
Dept: FAMILY MEDICINE CLINIC | Facility: CLINIC | Age: 58
End: 2023-10-26

## 2023-10-26 NOTE — TELEPHONE ENCOUNTER
Pt contacted forms dept to see if we received fmla forms. Informed pt that no forms received. Confirmed fax number with pt, states she will call nayeli to re-fax. States all details for FMLA are the same, no changes.

## 2023-10-27 ENCOUNTER — OFFICE VISIT (OUTPATIENT)
Dept: FAMILY MEDICINE CLINIC | Facility: CLINIC | Age: 58
End: 2023-10-27

## 2023-10-27 VITALS
SYSTOLIC BLOOD PRESSURE: 99 MMHG | OXYGEN SATURATION: 97 % | RESPIRATION RATE: 14 BRPM | WEIGHT: 144.38 LBS | HEART RATE: 65 BPM | DIASTOLIC BLOOD PRESSURE: 64 MMHG | BODY MASS INDEX: 25.58 KG/M2 | HEIGHT: 63 IN

## 2023-10-27 DIAGNOSIS — Z00.00 ANNUAL PHYSICAL EXAM: Primary | ICD-10-CM

## 2023-10-27 PROCEDURE — 3008F BODY MASS INDEX DOCD: CPT | Performed by: FAMILY MEDICINE

## 2023-10-27 PROCEDURE — 3074F SYST BP LT 130 MM HG: CPT | Performed by: FAMILY MEDICINE

## 2023-10-27 PROCEDURE — 90750 HZV VACC RECOMBINANT IM: CPT | Performed by: FAMILY MEDICINE

## 2023-10-27 PROCEDURE — 90471 IMMUNIZATION ADMIN: CPT | Performed by: FAMILY MEDICINE

## 2023-10-27 PROCEDURE — 3078F DIAST BP <80 MM HG: CPT | Performed by: FAMILY MEDICINE

## 2023-10-27 PROCEDURE — 99396 PREV VISIT EST AGE 40-64: CPT | Performed by: FAMILY MEDICINE

## 2023-10-27 RX ORDER — AZITHROMYCIN 250 MG/1
TABLET, FILM COATED ORAL
Qty: 6 TABLET | Refills: 0 | Status: SHIPPED | OUTPATIENT
Start: 2023-10-27 | End: 2023-10-31

## 2023-10-27 NOTE — TELEPHONE ENCOUNTER
Pt called again advised after looking in the Inbox, that we still hadn't received forms for FMLA, pt will contact requester again and give email address and see if they can just email to our dept for processing

## 2023-10-27 NOTE — PROGRESS NOTES
Subjective:   Patient ID: Nahun Correa is a 65year old female. 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 Negative for activity change, appetite change, diaphoresis and fatigue. Respiratory: see hpi   Cardiovascular: Negative. Negative for chest pain, palpitations and leg swelling. Gastrointestinal: Negative. Negative for abdominal pain. MSK see hpi   Current Outpatient Medications   Medication Sig Dispense Refill    azithromycin (ZITHROMAX Z-DARIELA) 250 MG Oral Tab Take 2 tablets (500 mg total) by mouth daily for 1 day, THEN 1 tablet (250 mg total) daily for 4 days. 6 tablet 0    hydroxychloroquine 200 MG Oral Tab Take 1 tablet (200 mg total) by mouth 2 (two) times daily. 180 tablet 3    Omega-3 Fatty Acids (FISH OIL OR) Take by mouth daily. Vitamin D3 1000 units Oral Tab Take 1 tablet (1,000 Units total) by mouth 2 (two) times daily. Calcium Carbonate-Vitamin D 500-200 MG-UNIT Oral Tab Take 1 tablet by mouth daily. Take 2 tabs. Every day       Allergies:No Known Allergies    Objective:   Physical Exam  Constitutional:       Appearance: She is well-developed. Cardiovascular:      Rate and Rhythm: Normal rate and regular rhythm. Heart sounds: Normal heart sounds. Pulmonary:      Effort: Pulmonary effort is normal.      Breath sounds: Normal breath sounds. Comments: Prolonged exp phase  Skin:     General: Skin is warm. Neurological:      Mental Status: She is alert. Deep Tendon Reflexes: Reflexes are normal and symmetric.          Assessment & Plan:   Annual physical exam  (primary encounter diagnosis)  Stable cpm   Diet and exercise discussed  SLE stable cpm per dr Rodriguez Patient   URI - if not better in a week may start z pack  Orders Placed This Encounter      Comp Metabolic Panel (14)      Lipid Panel      Assay, Thyroid Stim Hormone      Zoster Recombinant Adjuvanted (Shingrix -Shingles) [19698]      Meds This Visit:  Requested Prescriptions     Signed Prescriptions Disp Refills    azithromycin (ZITHROMAX Z-DARIELA) 250 MG Oral Tab 6 tablet 0     Sig: Take 2 tablets (500 mg total) by mouth daily for 1 day, THEN 1 tablet (250 mg total) daily for 4 days.        Imaging & Referrals:  ZOSTER VACC RECOMBINANT IM [facility]

## 2023-10-27 NOTE — TELEPHONE ENCOUNTER
Pt called back after, stayed on phone w/ pt while checking for it. FMLA Received, adv again that Nothing is changing and will be identical to last one. . logged for processing   pt adv of turn around time and process, pt expressed understanding

## 2023-10-28 ENCOUNTER — LAB ENCOUNTER (OUTPATIENT)
Dept: LAB | Facility: HOSPITAL | Age: 58
End: 2023-10-28
Attending: INTERNAL MEDICINE
Payer: COMMERCIAL

## 2023-10-28 DIAGNOSIS — Z51.81 THERAPEUTIC DRUG MONITORING: ICD-10-CM

## 2023-10-28 DIAGNOSIS — E55.9 VITAMIN D DEFICIENCY: ICD-10-CM

## 2023-10-28 DIAGNOSIS — M32.9 SYSTEMIC LUPUS ERYTHEMATOSUS, UNSPECIFIED SLE TYPE, UNSPECIFIED ORGAN INVOLVEMENT STATUS (HCC): ICD-10-CM

## 2023-10-28 DIAGNOSIS — Z00.00 ANNUAL PHYSICAL EXAM: ICD-10-CM

## 2023-10-28 LAB
ALBUMIN SERPL-MCNC: 3.9 G/DL (ref 3.4–5)
ALBUMIN/GLOB SERPL: 1.1 {RATIO} (ref 1–2)
ALP LIVER SERPL-CCNC: 96 U/L
ALT SERPL-CCNC: 17 U/L
ANION GAP SERPL CALC-SCNC: 4 MMOL/L (ref 0–18)
AST SERPL-CCNC: 13 U/L (ref 15–37)
BILIRUB SERPL-MCNC: 1 MG/DL (ref 0.1–2)
BILIRUB UR QL: NEGATIVE
BUN BLD-MCNC: 11 MG/DL (ref 7–18)
BUN/CREAT SERPL: 15.1 (ref 10–20)
CALCIUM BLD-MCNC: 9.2 MG/DL (ref 8.5–10.1)
CHLORIDE SERPL-SCNC: 109 MMOL/L (ref 98–112)
CHOLEST SERPL-MCNC: 151 MG/DL (ref ?–200)
CLARITY UR: CLEAR
CO2 SERPL-SCNC: 29 MMOL/L (ref 21–32)
CREAT BLD-MCNC: 0.73 MG/DL
CREAT UR-SCNC: 105 MG/DL
CRP SERPL-MCNC: <0.29 MG/DL (ref ?–0.3)
DEPRECATED RDW RBC AUTO: 47.5 FL (ref 35.1–46.3)
EGFRCR SERPLBLD CKD-EPI 2021: 96 ML/MIN/1.73M2 (ref 60–?)
ERYTHROCYTE [DISTWIDTH] IN BLOOD BY AUTOMATED COUNT: 13.4 % (ref 11–15)
ERYTHROCYTE [SEDIMENTATION RATE] IN BLOOD: 11 MM/HR
FASTING PATIENT LIPID ANSWER: YES
FASTING STATUS PATIENT QL REPORTED: YES
GLOBULIN PLAS-MCNC: 3.7 G/DL (ref 2.8–4.4)
GLUCOSE BLD-MCNC: 93 MG/DL (ref 70–99)
GLUCOSE UR-MCNC: NORMAL MG/DL
HCT VFR BLD AUTO: 44.5 %
HDLC SERPL-MCNC: 92 MG/DL (ref 40–59)
HGB BLD-MCNC: 14 G/DL
KETONES UR-MCNC: NEGATIVE MG/DL
LDLC SERPL CALC-MCNC: 48 MG/DL (ref ?–100)
LEUKOCYTE ESTERASE UR QL STRIP.AUTO: NEGATIVE
MCH RBC QN AUTO: 29.9 PG (ref 26–34)
MCHC RBC AUTO-ENTMCNC: 31.5 G/DL (ref 31–37)
MCV RBC AUTO: 94.9 FL
MICROALBUMIN UR-MCNC: 1.36 MG/DL
MICROALBUMIN/CREAT 24H UR-RTO: 13 UG/MG (ref ?–30)
NITRITE UR QL STRIP.AUTO: NEGATIVE
NONHDLC SERPL-MCNC: 59 MG/DL (ref ?–130)
OSMOLALITY SERPL CALC.SUM OF ELEC: 293 MOSM/KG (ref 275–295)
PH UR: 7 [PH] (ref 5–8)
PLATELET # BLD AUTO: 185 10(3)UL (ref 150–450)
POTASSIUM SERPL-SCNC: 4 MMOL/L (ref 3.5–5.1)
PROT SERPL-MCNC: 7.6 G/DL (ref 6.4–8.2)
PROT UR-MCNC: 20 MG/DL
RBC # BLD AUTO: 4.69 X10(6)UL
SODIUM SERPL-SCNC: 142 MMOL/L (ref 136–145)
SP GR UR STRIP: 1.01 (ref 1–1.03)
TRIGL SERPL-MCNC: 53 MG/DL (ref 30–149)
TSI SER-ACNC: 1.52 MIU/ML (ref 0.36–3.74)
UROBILINOGEN UR STRIP-ACNC: NORMAL
VIT D+METAB SERPL-MCNC: 36.2 NG/ML (ref 30–100)
VLDLC SERPL CALC-MCNC: 7 MG/DL (ref 0–30)
WBC # BLD AUTO: 5.3 X10(3) UL (ref 4–11)

## 2023-10-28 PROCEDURE — 81001 URINALYSIS AUTO W/SCOPE: CPT

## 2023-10-28 PROCEDURE — 80053 COMPREHEN METABOLIC PANEL: CPT

## 2023-10-28 PROCEDURE — 80061 LIPID PANEL: CPT

## 2023-10-28 PROCEDURE — 82043 UR ALBUMIN QUANTITATIVE: CPT

## 2023-10-28 PROCEDURE — 84443 ASSAY THYROID STIM HORMONE: CPT

## 2023-10-28 PROCEDURE — 85027 COMPLETE CBC AUTOMATED: CPT

## 2023-10-28 PROCEDURE — 82306 VITAMIN D 25 HYDROXY: CPT

## 2023-10-28 PROCEDURE — 85652 RBC SED RATE AUTOMATED: CPT

## 2023-10-28 PROCEDURE — 86140 C-REACTIVE PROTEIN: CPT

## 2023-10-28 PROCEDURE — 82570 ASSAY OF URINE CREATININE: CPT

## 2023-10-28 PROCEDURE — 36415 COLL VENOUS BLD VENIPUNCTURE: CPT

## 2023-10-30 NOTE — TELEPHONE ENCOUNTER
Pt called asking if there is anything else needed by her from us. Informed pt that we did receive her FMLA forms but an NATANAEL/FCR will be needed. Offered to send [employer] message but pt states she does no know how to navigate through her [email or web address]. Pt will be going to  to sign NATANAEL/FCR.   Recert, all information will be the same as previous

## 2023-11-13 NOTE — TELEPHONE ENCOUNTER
Dr. Juan Kinney,     *The ACKNOWLEDGE button has been moved to the top right ribbon*    Please sign off on form if you agree to: Zeo recert for lupus  (place your signature on the first page only)    -From your Inbasket, Highlight the patient and click Chart   -Double click the 60/09/44 Forms Completion telephone encounter  -Johan Roldan down to the Media section   -Click the blue Hyperlink: Zoe Kinney [birth year]  -Click Acknowledge located in the top right ribbon/menu   -Drag the mouse into the blank space of the document and a + sign will appear. Left click to   electronically sign the document. Thank you,    Ruben Larkin.

## 2024-01-02 ENCOUNTER — OFFICE VISIT (OUTPATIENT)
Dept: FAMILY MEDICINE CLINIC | Facility: CLINIC | Age: 59
End: 2024-01-02

## 2024-01-02 VITALS
HEIGHT: 63 IN | WEIGHT: 139.63 LBS | SYSTOLIC BLOOD PRESSURE: 120 MMHG | DIASTOLIC BLOOD PRESSURE: 78 MMHG | HEART RATE: 85 BPM | BODY MASS INDEX: 24.74 KG/M2 | RESPIRATION RATE: 18 BRPM

## 2024-01-02 DIAGNOSIS — K64.9 HEMORRHOIDS, UNSPECIFIED HEMORRHOID TYPE: ICD-10-CM

## 2024-01-02 DIAGNOSIS — K92.1 BLOOD IN STOOL: ICD-10-CM

## 2024-01-02 DIAGNOSIS — R14.1 GAS PAIN: ICD-10-CM

## 2024-01-02 DIAGNOSIS — K59.00 CONSTIPATION, UNSPECIFIED CONSTIPATION TYPE: Primary | ICD-10-CM

## 2024-01-02 PROCEDURE — 3074F SYST BP LT 130 MM HG: CPT

## 2024-01-02 PROCEDURE — 3008F BODY MASS INDEX DOCD: CPT

## 2024-01-02 PROCEDURE — 3078F DIAST BP <80 MM HG: CPT

## 2024-01-02 PROCEDURE — 99213 OFFICE O/P EST LOW 20 MIN: CPT

## 2024-01-02 RX ORDER — DICYCLOMINE HYDROCHLORIDE 10 MG/1
10 CAPSULE ORAL
Qty: 15 CAPSULE | Refills: 0 | Status: SHIPPED | OUTPATIENT
Start: 2024-01-02 | End: 2024-01-17

## 2024-01-02 NOTE — ASSESSMENT & PLAN NOTE
Rectal hemorrhoids visualized on C-scope.  Increase fiber intake  Increase water intake  Escalate care as needed  Preparation H as needed    Patient aware of plan of care. All questions answered to satisfaction of the patient. Patient instructed to call office or reach out via [place] if any issues arise. For urgent issues and/or reviewed red flags please proceed to the urgent care or ER.  Also, inform the nurse practitioner with any new symptoms or medication side effects.

## 2024-01-02 NOTE — ASSESSMENT & PLAN NOTE
Patient with documented history of rectal hemorrhoids.  Patient straining to have bowel movement.  Encouraged to increase fiber intake trial Metamucil.

## 2024-01-02 NOTE — ASSESSMENT & PLAN NOTE
Patient started to have abdominal pain with radiation to the back when she became constipated this past week.  Only fiber intake is beans intermittently.  Patient knows this is constipation secondary to her back pain is usually happens for.  Patient has started take prune juice but still not having large formed bowel movements.  Encouraged to trial Metamucil.

## 2024-01-02 NOTE — PATIENT INSTRUCTIONS
Beber mucha agua y llevar marla dieta lee ann en fibra puede mantener las heces blandas y prevenir el estreñimiento. Los ablandadores de heces también pueden ayudar. Junto con el ejercicio, estos pueden ayudar a evitar que reaparezcan las hemorroides.  Consuma alimentos ricos en fibra. Consuma más alimentos ricos en fibra. [place] ayuda a ablandar las heces y aumentar whittington volumen, lo que le ayudará a evitar el esfuerzo. Agregue fibra a whittington dieta lentamente para evitar problemas con los gases. Si no puede obtener suficiente fibra en whittington dieta, le sugiero fibra de psyllium o Metamucil de venta luke. 1 cucharada al día mezclada con 8 onzas de agua.  Utilice tratamientos tópicos. [place] la Preparación H, que contiene un esteroide para reducir las hemorroides y también un agente anestésico para ayudar con el dolor. Aplique marla crema o supositorio para hemorroides que contenga hidrocortisona y que pueda comprar sin receta. También puede utilizar toallas sanitarias que contengan hamamelis o un medicamento anestésico.  Sumérjase regularmente en un baño tibio o en un baño de asiento. Remoje whittington área anal en agua tibia yael 10 a 15 minutos dos o arun veces al día. Marla bañera de asiento cabe sobre el inodoro.  [place] analgésicos por vía oral. Puede usar temporalmente acetaminofén (Tylenol, otros), aspirina o ibuprofeno (Advil, Motrin IB, otros) para ayudar a aliviar el malestar.

## 2024-01-02 NOTE — PROGRESS NOTES
Subjective:   Mary Carmen Viera is a 58 year old female who presents for Blood In Stool (Constipation x 2 days, bowel movement yesterday, has low back pain)   Patient is a pleasant 58-year-old female past medical history significant for lupus, anemia, and OA.  Patient  presents to office today for constipation and some bleeding with stool.  Patient states she has been constipated for the last 2 days with associated back pain which is typical for her when she is constipated. Has tried prune juice at home with some relief. Saturday am she had a little BM, none Sunday and then small again on Monday. Patient is reporting gas pain. Pain is getting better but not having larger BM. Reports some blood on tissue paper when wiping, straining to have BM    Last cscope 11/16/18. Hemorrhoids noted in rectum, small to medium. MD Regan        Past Medical History:   Diagnosis Date    Age-related nuclear cataract of both eyes 7/7/2015    Anemia     Arthritis     Back problem     Dry eye 2013    per ng schirmer 10mm/5mm    Early cataract 2013    History of blood transfusion 2013    2 units    Lipid screening 1/29/2013    per NG    Long-term use of Plaquenil 2013,2014    per ng w/ no toxicity    Lupus (HCC) 2008    per ng sistemic lupus erythematous ; prednison & plaquenil; lupus nephritis nephrotic range proteinuria    Meibomian gland dysfunction 2013    SLE (systemic lupus erythematosus) (HCC) 2008    Prednisone and Plaquenil      Past Surgical History:   Procedure Laterality Date    Blood transfusions  2013    per ng 2 unit    Cataract  2013    per ng early    Colonoscopy N/A 11/16/2018    Procedure: COLONOSCOPY;  Surgeon: Buddy Regan MD;  Location: [facility] ENDOSCOPY    Hysterectomy  2013    [place] teeth removed  02/2021        History/Other:    Chief Complaint Reviewed and Verified  Nursing Notes Reviewed and   Verified  Tobacco Reviewed  Allergies Reviewed  Medications Reviewed    Problem List Reviewed  Medical History  Reviewed  Surgical History   Reviewed  Family History Reviewed         Tobacco:  She has never smoked tobacco.    Current Outpatient Medications   Medication Sig Dispense Refill    dicyclomine 10 MG Oral Cap Take 1 capsule (10 mg total) by mouth daily as needed. 15 capsule 0    hydroxychloroquine 200 MG Oral Tab Take 1 tablet (200 mg total) by mouth 2 (two) times daily. 180 tablet 3    Omega-3 Fatty Acids (FISH OIL OR) Take by mouth daily.      Vitamin D3 1000 units Oral Tab Take 1 tablet (1,000 Units total) by mouth 2 (two) times daily.      Calcium Carbonate-Vitamin D 500-200 MG-UNIT Oral Tab Take 1 tablet by mouth daily. Take 2 tabs. Every day           Review of Systems:  Review of Systems   Constitutional: Negative.  Negative for activity change and appetite change.   HENT: Negative.  Negative for congestion, postnasal drip, rhinorrhea, sore throat, tinnitus and voice change.    Eyes: Negative.    Respiratory: Negative.  Negative for apnea, cough, chest tightness and shortness of breath.    Cardiovascular:  Negative for chest pain and leg swelling.   Gastrointestinal:  Positive for blood in stool and constipation. Negative for abdominal pain and anal bleeding.   Genitourinary: Negative.  Negative for difficulty urinating, flank pain and menstrual problem.   Musculoskeletal: Negative.  Negative for joint swelling.   Skin: Negative.    Neurological: Negative.  Negative for dizziness and headaches.   Psychiatric/Behavioral: Negative.  Negative for agitation.          Objective:   /78   Pulse 85   Resp 18   Ht 5' 3\" (1.6 m)   Wt 139 lb 9.6 oz (63.3 kg)   BMI 24.73 kg/m²  Estimated body mass index is 24.73 kg/m² as calculated from the following:    Height as of this encounter: 5' 3\" (1.6 m).    Weight as of this encounter: 139 lb 9.6 oz (63.3 kg).  Physical Exam  Vitals and nursing note reviewed.   Constitutional:       General: She is not in acute distress.     Appearance: Normal appearance. She is  well-developed and normal weight.   HENT:      Head: Normocephalic and atraumatic.      Right Ear: External ear normal.      Left Ear: External ear normal.      Nose: Nose normal.   Neck:      Thyroid: No thyromegaly.   Cardiovascular:      Rate and Rhythm: Normal rate and regular rhythm.      Pulses: Normal pulses.      Heart sounds: Normal heart sounds. No murmur heard.  Pulmonary:      Effort: Pulmonary effort is normal. No respiratory distress.      Breath sounds: Normal breath sounds. No wheezing or rales.   Chest:      Chest wall: No tenderness.   Abdominal:      General: Bowel sounds are normal. There is no distension.      Palpations: Abdomen is soft.      Tenderness: There is no abdominal tenderness.   Musculoskeletal:         General: No tenderness. Normal range of motion.      Cervical back: Normal range of motion and neck supple.   Lymphadenopathy:      Cervical: No cervical adenopathy.   Skin:     General: Skin is warm and dry.      Capillary Refill: Capillary refill takes less than 2 seconds.      Findings: No rash.   Neurological:      Mental Status: She is alert and oriented to person, place, and time.      Coordination: Coordination normal.   Psychiatric:         Behavior: Behavior normal.         Thought Content: Thought content normal.         Judgment: Judgment normal.           Assessment & Plan:   1. Constipation, unspecified constipation type (Primary)  Assessment & Plan:  Patient started to have abdominal pain with radiation to the back when she became constipated this past week.  Only fiber intake is beans intermittently.  Patient knows this is constipation secondary to her back pain is usually happens for.  Patient has started take prune juice but still not having large formed bowel movements.  Encouraged to trial Metamucil.  Orders:  -     Dicyclomine HCl; Take 1 capsule (10 mg total) by mouth daily as needed.  Dispense: 15 capsule; Refill: 0  2. Blood in stool  Assessment & Plan:  Patient  with documented history of rectal hemorrhoids.  Patient straining to have bowel movement.  Encouraged to increase fiber intake trial Metamucil.  3. Hemorrhoids, unspecified hemorrhoid type  Assessment & Plan:  Rectal hemorrhoids visualized on C-scope.  Increase fiber intake  Increase water intake  Escalate care as needed  Preparation H as needed    Patient aware of plan of care. All questions answered to satisfaction of the patient. Patient instructed to call office or reach out via [employer] if any issues arise. For urgent issues and/or reviewed red flags please proceed to the urgent care or ER.  Also, inform the nurse practitioner with any new symptoms or medication side effects.          4. Gas pain  Assessment & Plan:  Intermittent gasping since trialing prune juice.  Will trial baclofen 10 days as needed.    Orders:  -     Dicyclomine HCl; Take 1 capsule (10 mg total) by mouth daily as needed.  Dispense: 15 capsule; Refill: 0        Return if symptoms worsen or fail to improve.    Buddy Santana, RUTHY, 1/2/2024, 12:27 PM

## 2024-01-12 DIAGNOSIS — K59.00 CONSTIPATION, UNSPECIFIED CONSTIPATION TYPE: ICD-10-CM

## 2024-01-12 DIAGNOSIS — R14.1 GAS PAIN: ICD-10-CM

## 2024-01-12 NOTE — TELEPHONE ENCOUNTER
Pharmacy requesting refill    Current Outpatient Medications   Medication Sig Dispense Refill    dicyclomine 10 MG Oral Cap Take 1 capsule (10 mg total) by mouth daily as needed. 15 capsule 0

## 2024-01-12 NOTE — TELEPHONE ENCOUNTER
Patient is requesting a refill for the medication provider prescribes but does not know the name and mentions she is concerned about the new insurance and pharmacy having a delay in refilling.   Please advise.    Patient requesting a 3 month refill.

## 2024-01-12 NOTE — TELEPHONE ENCOUNTER
LOV: 4/12/23  Summary:  1.  Try tyelnol -arthritis  first - for hands and lower back pain and upper back pain   2.. Cont. hydroxychlrqouine 200mg twice a day    3. Check labs every 6 months.   4. Return to clinic in 6 months -   5. Take fish oil and turmeric -  - try increasing omega 3 to 2000mg a day - to see if this helps the joint pain   6. Try turmeric   7. Exercises for plantar fasciitis            She decliens PT at this time for her lower back.      Rafael Ohara MD  4/12/2023   4:55 PM  - Reviewed [place] information  through Close     Future Appointments   Date Time Provider Department Center   2/7/2024 11:00 AM Rafael Ohara MD [facility]   6/26/2024  9:00 AM Abundio Peña MD [facility]     Labs:      Component      Latest Ref Rng 10/28/2023   Color Urine      Yellow  Light-Yellow    Clarity Urine      Clear  Clear    Spec Gravity      1.005 - 1.030  1.012    Glucose Urine      Normal mg/dL Normal    Bilirubin Urine      Negative  Negative    Ketones, UA      Negative mg/dL Negative    Blood Urine      Negative  Trace !    PH Urine      5.0 - 8.0  7.0    Protein Urine      Negative mg/dL 20 !    Urobilinogen Urine      Normal  Normal    Nitrite Urine      Negative  Negative    Leukocyte Esterase       Negative  Negative    WBC Urine      0 - 5 /HPF 1-5    RBC Urine      0 - 2 /HPF 0-2    Bacteria Urine      None Seen /HPF None Seen    SQUAM EPI CELLS UR      None Seen /HPF None Seen    RENAL TUBULAR EPITHELIAL CELLS      None Seen /HPF None Seen    TRANSITIONAL EPI CELLS      None Seen /HPF None Seen    YEAST URINE      None Seen /HPF None Seen    WBC      4.0 - 11.0 x10(3) uL 5.3    RBC      3.80 - 5.30 x10(6)uL 4.69    Hemoglobin      12.0 - 16.0 g/dL 14.0    Hematocrit      35.0 - 48.0 % 44.5    MCV      80.0 - 100.0 fL 94.9    MCH      26.0 - 34.0 pg 29.9    MCHC      31.0 - 37.0 g/dL 31.5    RDW      11.0 - 15.0 % 13.4    RDW-SD      35.1 - 46.3 fL 47.5 (H)    Platelet  Count      150.0 - 450.0 10(3)uL 185.0    MALB URINE      mg/dL 1.36    CREATININE UR RANDOM      mg/dL 105.00    MALB/CRE CALC      <=30.0 ug/mg 13.0    SED RATE      0 - 30 mm/Hr 11    C-REACTIVE PROTEIN      <0.30 mg/dL <0.29    VITAMIN D, 25-OH, TOTAL      30.0 - 100.0 ng/mL 36.2       Legend:  ! Abnormal  (H) High

## 2024-01-12 NOTE — TELEPHONE ENCOUNTER
Phoned pt; verified name and . Identified that pt needs refill on Hydroxychloroquine. Pt states she has new insurance and does not know what Dr Ohara needs to do about the script because of the new insurance. Informed pt script will be sent and pharmacy will run under new insurance. Pharmacy will notify our office if insurance will not cover the medication or of a Prior authorization will be required.

## 2024-01-13 RX ORDER — HYDROXYCHLOROQUINE SULFATE 200 MG/1
200 TABLET, FILM COATED ORAL 2 TIMES DAILY
Qty: 180 TABLET | Refills: 0 | Status: SHIPPED | OUTPATIENT
Start: 2024-01-13

## 2024-01-13 NOTE — TELEPHONE ENCOUNTER
Please review; protocol failed. Or has no protocol    Requested Prescriptions   Pending Prescriptions Disp Refills    dicyclomine 10 MG Oral Cap 15 capsule 0     Sig: Take 1 capsule (10 mg total) by mouth daily as needed.       Gastrointestional Medication Protocol Passed - 1/12/2024  2:31 PM        Passed - In person appointment or virtual visit in the past 12 mos or appointment in next 3 mos     Recent Outpatient Visits              1 week ago Constipation, unspecified constipation type    [facility], Buddy Angela APRN    Office Visit    2 months ago [facility]    [facility] Visit    9 months ago Systemic lupus erythematosus, unspecified SLE type, unspecified organ involvement status (HCC)    [facility]    Office Visit    1 year ago Viral URI with cough    [facility]    Office Visit    1 year ago Age-related nuclear cataract of both eyes    [facility]    Office Visit          Future Appointments         Provider Department Appt Notes    In 3 weeks Rafael Ohara MD [facility] f/u from LOV    In 5 months [facility] Plaquenil EE                     Recent Outpatient Visits              1 week ago Constipation, unspecified constipation type    [facility], Buddy Angela APRN    Office Visit    2 months ago [facility] physical exam    [facility]    Office Visit    9 months ago Systemic lupus erythematosus, unspecified SLE type, unspecified organ involvement status (HCC)     [facility]    [facility] Visit    1 year ago Viral URI with cough    [facility], [facility], Trini Berg APN    Office Visit    1 year ago Age-related nuclear cataract of both eyes    [facility]    Office Visit           Future Appointments         Provider Department Appt Notes    [facility] 3 weeks [facility]    In 5 months [facility] Plaquenil EE

## 2024-01-14 RX ORDER — DICYCLOMINE HYDROCHLORIDE 10 MG/1
10 CAPSULE ORAL
Qty: 15 CAPSULE | Refills: 0 | Status: SHIPPED | OUTPATIENT
Start: 2024-01-14 | End: 2024-01-14

## 2024-01-14 RX ORDER — DICYCLOMINE HYDROCHLORIDE 10 MG/1
10 CAPSULE ORAL
Qty: 15 CAPSULE | Refills: 0 | Status: SHIPPED | OUTPATIENT
Start: 2024-01-14 | End: 2024-01-29

## 2024-01-14 NOTE — TELEPHONE ENCOUNTER
Please call patient and see if she is still having gas pain issues?Happy to refill bentyl if needed but only as needed, should not be taking daily. If pain continues to be an issues recommend referral to MD rosenbaum in GI.    Thanks

## 2024-01-17 ENCOUNTER — TELEPHONE (OUTPATIENT)
Dept: FAMILY MEDICINE CLINIC | Facility: CLINIC | Age: 59
End: 2024-01-17

## 2024-01-17 DIAGNOSIS — L98.9 LESION OF NECK: Primary | ICD-10-CM

## 2024-01-17 NOTE — TELEPHONE ENCOUNTER
Patient called requesting to schedule her 2nd shingles vaccine. I spoke to a triage nurse and she stated that patient is overdue for 2nd dose. Does she need to start over since she is over due?

## 2024-01-22 ENCOUNTER — TELEPHONE (OUTPATIENT)
Dept: FAMILY MEDICINE CLINIC | Facility: CLINIC | Age: 59
End: 2024-01-22

## 2024-01-22 DIAGNOSIS — Z23: Primary | ICD-10-CM

## 2024-01-29 DIAGNOSIS — R14.1 GAS PAIN: ICD-10-CM

## 2024-01-29 DIAGNOSIS — K59.00 CONSTIPATION, UNSPECIFIED CONSTIPATION TYPE: ICD-10-CM

## 2024-01-29 NOTE — TELEPHONE ENCOUNTER
Per pharmacy, is requesting 90 day       dicyclomine 10 MG Oral Cap, Take 1 capsule (10 mg total) by mouth daily as needed., Disp: 15 capsule, Rfl: 0

## 2024-01-31 RX ORDER — DICYCLOMINE HYDROCHLORIDE 10 MG/1
10 CAPSULE ORAL
Qty: 15 CAPSULE | Refills: 0 | Status: SHIPPED | OUTPATIENT
Start: 2024-01-31 | End: 2024-04-30

## 2024-01-31 NOTE — TELEPHONE ENCOUNTER
Routed to Buddy BLISS for advise, thanks.  Last ref 1-14-24 # 15  Also [employer] message sent to pt.         Refill Passed Per Protocol    Requested Prescriptions   Pending Prescriptions Disp Refills    dicyclomine 10 MG Oral Cap 15 capsule 5     Sig: Take 1 capsule (10 mg total) by mouth daily as needed.       Gastrointestional Medication Protocol Passed - 1/29/2024 10:16 AM        Passed - In person appointment or virtual visit in the past 12 mos or appointment in next 3 mos     Recent Outpatient Visits              4 weeks ago Constipation, unspecified constipation type    [facility], Buddy Angela APRN    Office Visit    3 months ago Annual physical exam    [facility]    Office Visit    9 months ago Systemic lupus erythematosus, unspecified SLE type, unspecified organ involvement status (HCC)    [facility]    Office Visit    1 year ago Viral URI with cough    [facility] [place] [place]    Office Visit    1 year ago Age-related nuclear cataract of both eyes    [facility]    Office Visit          Future Appointments         Provider Department Appt Notes    In 1 week [facility] ok to book per encounter    In 1 week [facility]    In 4 months [facility]1 year Plaquenil EE                    Future Appointments         Provider Department Appt Notes    In 1 week [facility] FM RN [facility] [place] 2nd shingles vaccine ok to book per encounter    In 1 week May[facility]  [facility]    In 4 months [facility] Plaquenil EE          Recent Outpatient Visits              4 weeks ago Constipation, unspecified constipation type    [facility], Buddy Angela APRN    Office Visit    3 months ago Annual physical exam    [facility] [place] Beth Smith MD    Office Visit    9 months ago Systemic lupus erythematosus, unspecified SLE type, unspecified organ involvement status (HCC)    [facility]    Office Visit    1 year ago Viral URI with cough    [facility] [place] [facility]    Office Visit    1 year ago Age-related nuclear cataract of both eyes    [facility] Abundio Peña MD    Office Visit

## 2024-02-05 ENCOUNTER — LAB ENCOUNTER (OUTPATIENT)
Dept: LAB | Facility: HOSPITAL | Age: 59
End: 2024-02-05
Attending: INTERNAL MEDICINE
Payer: COMMERCIAL

## 2024-02-05 DIAGNOSIS — M32.9 SYSTEMIC LUPUS ERYTHEMATOSUS, UNSPECIFIED SLE TYPE, UNSPECIFIED ORGAN INVOLVEMENT STATUS (HCC): ICD-10-CM

## 2024-02-05 DIAGNOSIS — Z51.81 THERAPEUTIC DRUG MONITORING: ICD-10-CM

## 2024-02-05 DIAGNOSIS — E55.9 VITAMIN D DEFICIENCY: ICD-10-CM

## 2024-02-05 LAB
ALT SERPL-CCNC: 12 U/L
AST SERPL-CCNC: 18 U/L (ref ?–34)
BILIRUB UR QL: NEGATIVE
CREAT BLD-MCNC: 0.63 MG/DL
CREAT UR-SCNC: 103.6 MG/DL
CRP SERPL-MCNC: <0.4 MG/DL (ref ?–1)
DEPRECATED RDW RBC AUTO: 47.3 FL (ref 35.1–46.3)
EGFRCR SERPLBLD CKD-EPI 2021: 103 ML/MIN/1.73M2 (ref 60–?)
ERYTHROCYTE [DISTWIDTH] IN BLOOD BY AUTOMATED COUNT: 13.4 % (ref 11–15)
ERYTHROCYTE [SEDIMENTATION RATE] IN BLOOD: 5 MM/HR
GLUCOSE UR-MCNC: NORMAL MG/DL
HCT VFR BLD AUTO: 42.7 %
HGB BLD-MCNC: 13.4 G/DL
KETONES UR-MCNC: NEGATIVE MG/DL
LEUKOCYTE ESTERASE UR QL STRIP.AUTO: NEGATIVE
MCH RBC QN AUTO: 29.7 PG (ref 26–34)
MCHC RBC AUTO-ENTMCNC: 31.4 G/DL (ref 31–37)
MCV RBC AUTO: 94.7 FL
MICROALBUMIN UR-MCNC: 0.7 MG/DL
MICROALBUMIN/CREAT 24H UR-RTO: 6.8 UG/MG (ref ?–30)
NITRITE UR QL STRIP.AUTO: NEGATIVE
PH UR: 6.5 [PH] (ref 5–8)
PLATELET # BLD AUTO: 152 10(3)UL (ref 150–450)
PROT UR-MCNC: 20 MG/DL
RBC # BLD AUTO: 4.51 X10(6)UL
SP GR UR STRIP: 1.01 (ref 1–1.03)
UROBILINOGEN UR STRIP-ACNC: NORMAL
VIT D+METAB SERPL-MCNC: 44.6 NG/ML (ref 30–100)
WBC # BLD AUTO: 2.9 X10(3) UL (ref 4–11)

## 2024-02-05 PROCEDURE — 82570 ASSAY OF URINE CREATININE: CPT

## 2024-02-05 PROCEDURE — 36415 COLL VENOUS BLD VENIPUNCTURE: CPT

## 2024-02-05 PROCEDURE — 85027 COMPLETE CBC AUTOMATED: CPT

## 2024-02-05 PROCEDURE — 82043 UR ALBUMIN QUANTITATIVE: CPT

## 2024-02-05 PROCEDURE — 84460 ALANINE AMINO (ALT) (SGPT): CPT

## 2024-02-05 PROCEDURE — 82306 VITAMIN D 25 HYDROXY: CPT

## 2024-02-05 PROCEDURE — 86140 C-REACTIVE PROTEIN: CPT

## 2024-02-05 PROCEDURE — 84450 TRANSFERASE (AST) (SGOT): CPT

## 2024-02-05 PROCEDURE — 82565 ASSAY OF CREATININE: CPT

## 2024-02-05 PROCEDURE — 85652 RBC SED RATE AUTOMATED: CPT

## 2024-02-05 PROCEDURE — 81001 URINALYSIS AUTO W/SCOPE: CPT

## 2024-02-07 ENCOUNTER — NURSE ONLY (OUTPATIENT)
Dept: FAMILY MEDICINE CLINIC | Facility: CLINIC | Age: 59
End: 2024-02-07

## 2024-02-07 ENCOUNTER — OFFICE VISIT (OUTPATIENT)
Dept: RHEUMATOLOGY | Facility: CLINIC | Age: 59
End: 2024-02-07

## 2024-02-07 VITALS
RESPIRATION RATE: 16 BRPM | DIASTOLIC BLOOD PRESSURE: 66 MMHG | BODY MASS INDEX: 24.19 KG/M2 | SYSTOLIC BLOOD PRESSURE: 104 MMHG | HEIGHT: 63 IN | WEIGHT: 136.5 LBS | HEART RATE: 69 BPM

## 2024-02-07 DIAGNOSIS — M32.9 SYSTEMIC LUPUS ERYTHEMATOSUS, UNSPECIFIED SLE TYPE, UNSPECIFIED ORGAN INVOLVEMENT STATUS (HCC): Primary | ICD-10-CM

## 2024-02-07 DIAGNOSIS — E55.9 VITAMIN D DEFICIENCY: ICD-10-CM

## 2024-02-07 DIAGNOSIS — Z23 IMMUNIZATION DUE: Primary | ICD-10-CM

## 2024-02-07 DIAGNOSIS — Z51.81 THERAPEUTIC DRUG MONITORING: ICD-10-CM

## 2024-02-07 PROCEDURE — 90750 HZV VACC RECOMBINANT IM: CPT | Performed by: FAMILY MEDICINE

## 2024-02-07 PROCEDURE — 90471 IMMUNIZATION ADMIN: CPT | Performed by: FAMILY MEDICINE

## 2024-02-07 PROCEDURE — 99214 OFFICE O/P EST MOD 30 MIN: CPT | Performed by: INTERNAL MEDICINE

## 2024-02-07 RX ORDER — METHYLPREDNISOLONE 4 MG/1
4 TABLET ORAL DAILY
COMMUNITY

## 2024-02-07 RX ORDER — HYDROXYCHLOROQUINE SULFATE 200 MG/1
200 TABLET, FILM COATED ORAL 2 TIMES DAILY
Qty: 180 TABLET | Refills: 2 | Status: SHIPPED | OUTPATIENT
Start: 2024-02-07

## 2024-02-07 RX ORDER — AMOXICILLIN 500 MG/1
500 CAPSULE ORAL AS DIRECTED
COMMUNITY

## 2024-02-07 RX ORDER — IBUPROFEN 800 MG/1
800 TABLET ORAL EVERY 6 HOURS PRN
COMMUNITY

## 2024-02-07 NOTE — PROGRESS NOTES
HPI:     Chief Complaint   Patient presents with    SLE    Medication Follow-Up    Lab Results    Hand Pain     B/L       I had the pleasure of seeing Mrs. Mary Carmen Viear. . As you recall, she is a pleasant 55  year old who has a hx of SLE.  She's on plaquenil 200mg bid. She was on azathioprine and now off.     3/12/2018  The physical therapy helped her but she stil lhast hte pain. It's not as bad as before. She has 4/10 pain. She keeps doing the exercises.   She lost 6 pounds since her last visit.   She is moving more and the therapy exercises are doing better.   Her back is better. It's not completley 100% but it's better than before.     11/2/2018  She's doing ok. Nothing worse. She has 3/10 pain in her back. She has mid back pain in her kidneys.   She saw dr. morris yesterday - no hcq toxicity.   She occl has a few palpitations.     6/17/2019  hs'e doing ok. She soemtimes has a lot of pain in her upper back and wrists.   Also her ankles hurt both sides - but more in her right ankle. She has noticed this going on for 1 month now.   She has 2/10 pain.   Her mid back is still sore. She has trouble lifting heavy thing.s     3/16/2020  She has 4/10 pain in her low back and neck pain. She has some hand pain too but it's not bad.   seh has trouble lifting things stil - likely from work.   Her ankles are ok. But she has a little bit of pain in he rright ankel.   No rashes, no oral ulcers.   She works at the [employer]. She had a mild cough 1 month ago.   Sometimes she feels chest tight and sometimes tachycardia. It's nota ll the time.   Echo oin 2016 was normal.   Her white count is lower than normal at 3.4 in 1/2020.     11/16/2020  VIDEO VISIT  She feels bad. She has fever and headahes and chest pain  . All her joints hurt.   Her feet hurt. She had a bad headache from behind her ear to her other ear.   She was scared b/c she feels bad.  Her fever is around 100.8F and sometimes 102.6F   No sob. She feels mostly  pain. She has body pain, her knees are hurting. She has bad muscle pains as well.   She got covid testing last week and this was negative.   Then she went to the ER on 11/12/2020 - covid was run for the 2nd time and it was negative.   Rapid strep was negative.   She doesn't know what is happening.   Her recent labs on 11/12/2020 - showed low wbc of 3.0 and plt slightly low.   She was a little runny nose today.   Last night she had fevers.   She had viral pneumonitis in left upper lobe     12/14/2020   She has a viral pnemonitis in 11/2020. She feels achy and flared up.   She felt better on medrol torres and the pain was better until 1 week ago.   She has about 3/10 pain - it's in her upper back. shes' wondering if this is still lupus with the pain coming   She had follow up chest ray on 11/18 - that showed resolution of opacities.   She had general aches and the covid tests was negative.   She had labs on 11/18/2020 - sed rate 39mm/hr. And crp 1.2mg/dL,   She has microalbuminuria 840  Her creatinine is normal.   Her wbc is 2.9 with lypmhopenia.     9/15/2021  She's feels so so.   She had a accidental needle stick at work.   She was to get the hep b vaccine. Her last one is in January.   She was also checked for HIV this is negative and she will check again in 2/2022.     She went to her dentist in 2/2021 and gotten two wisdom teeth removed and she still have pain and swelling over the left cheek.   She is going to try accupuncutre with the chiropracter. She is going to see a neurologist for this pain . This pain comes and goes . The pain is not all the time but over her left jaw. She feels sensitive . No numbness or tingling. It's a sharp apin close to her ear. It's not so bad. She was dx with TMJ with her dentist and referred to PT.   She hasn't gone yet - she is thinking about chiropractic care instead. In the begnining she had numbnes over her right cheek , now it's better.     7/18/2022  She has pain in heir hands  and she has more pain in her upper back and mid back.   She has about 4/10 pain in her hands. Her back is around 3/10 pain - sometimes it can goto t6/10 pain.   She still has trigmeinal neuralgia over her left cheek. She got ct scan but ahns't gotten this yet.   She saw dr. Bales and given celebrex but it didn' thelp. Much.   She has more pain with hare hands with working and it's better on the weekend.     4/12/2023  She has a little bit of pain in her  hands wrists and ankles,, soemtems there is a burning in the bottom of her feet.   She has about 4-5/10 pain in her feet.   She has 4/10 pain in her hands.   The pain is burning in the feet.     2/7/2024  She feels so so - her neck and upper back pain is stilll there.   She feels dizziness as well at times.   When she is walking she feels she gets dizzy. She gets weak down her right shoulder and right leg. - this happens at times.   In December she had bad consitpation- she was having pain - and given dicyclomine - and took one bottle without refills.   She's ok now.   It's weird b/c she never had constipation.   She took medicine and she had her teeth fixed.   She's not exercising.     She is losing weight unitentionally -3-4 months.   She does skip breakfast .   She does get burning on her feet at times - it's not as bad.   Her ingers - her pip joitns are hurting. And her right knee hurts with pain and burning.   She has about 6 /10 at the most in theses joints - like her right knee.     She took a medrol torres -and amoxicillin,  for dental work - for her upper tooth and her lower teeth.   It did help her pain -   She is also taking ibuprofen 800mg a day -       HISTORY:  Past Medical History:   Diagnosis Date    Age-related nuclear cataract of both eyes 7/7/2015    Anemia     Arthritis     Back problem     Dry eye 2013    per ng schirmer 10mm/5mm    Early cataract 2013    History of blood transfusion 2013    2 units    Lipid screening 1/29/2013    per NG     Long-term use of Plaquenil 2013,2014    per ng w/ no toxicity    Lupus [facility]) 2008    per ng sistemic lupus erythematous ; prednison & plaquenil; lupus nephritis nephrotic range proteinuria    Meibomian gland dysfunction 2013    SLE (systemic lupus erythematosus) ([facility]) 2008    Prednisone and Plaquenil      Social Hx Reviewed   Family Hx Reviewed     Medications (Active prior to today's visit):  Current Outpatient Medications   Medication Sig Dispense Refill    methylPREDNISolone 4 MG Oral Tab Take 1 tablet (4 mg total) by mouth daily. FOR 6 DAYS      ibuprofen 800 MG Oral Tab Take 1 tablet (800 mg total) by mouth every 6 (six) hours as needed for Pain.      hydroxychloroquine 200 MG Oral Tab Take 1 tablet (200 mg total) by mouth 2 (two) times daily. 180 tablet 0    Omega-3 Fatty Acids (FISH OIL OR) Take by mouth daily.      Vitamin D3 1000 units Oral Tab Take 1 tablet (1,000 Units total) by mouth 2 (two) times daily.      Calcium Carbonate-Vitamin D 500-200 MG-UNIT Oral Tab Take 1 tablet by mouth daily. Take 2 tabs. Every day      amoxicillin 500 MG Oral Cap Take 1 capsule (500 mg total) by mouth As Directed. After DENTAL procedure (Patient not taking: Reported on 2/7/2024)      dicyclomine 10 MG Oral Cap Take 1 capsule (10 mg total) by mouth daily as needed. (Patient not taking: Reported on 2/7/2024) 15 capsule 0       Allergies:  No Known Allergies      ROS:   All other ROS are negative.     PHYSICAL EXAM:   HEENT:  clear sclera  PERRLA, pleasant, no acute distress, no CAD, no neck tendnerness, good ROM,   No rashes - no malar rash  Left ear , mxilalary area and jaw tender - likley tmj  Tapping did not cause any tingling.   CVS: RRR,  RS:  CTAB  ABD: Soft not tender   Joint exam :  Tenderness in lower back   Right 3rd pip - oa change stender with mild swelling.   Tender in left 3rd finger with mid swelling  Right 2nd and 5th dip tender with mild heberdone nodes   Early oa changes in hadns   B/ lwrists mild tender    Not tender in bl knees, right knee crepitus felt -   No synovitis seen       Component      Latest Ref Rng 10/28/2023   Glucose      70 - 99 mg/dL 93    Sodium      136 - 145 mmol/L 142    Potassium      3.5 - 5.1 mmol/L 4.0    Chloride      98 - 112 mmol/L 109    Carbon Dioxide, Total      21.0 - 32.0 mmol/L 29.0    ANION GAP      0 - 18 mmol/L 4    BUN      7 - 18 mg/dL 11    CREATININE      0.55 - 1.02 mg/dL 0.73    BUN/CREATININE RATIO      10.0 - 20.0  15.1    CALCIUM      8.5 - 10.1 mg/dL 9.2    CALCULATED OSMOLALITY      275 - 295 mOsm/kg 293    EGFR      >=60 mL/min/1.73m2 96    ALT (SGPT)      13 - 56 U/L 17    AST (SGOT)      15 - 37 U/L 13 (L)    ALKALINE PHOSPHATASE      46 - 118 U/L 96    Total Bilirubin      0.1 - 2.0 mg/dL 1.0    PROTEIN, TOTAL      6.4 - 8.2 g/dL 7.6    Albumin      3.4 - 5.0 g/dL 3.9    Globulin      2.8 - 4.4 g/dL 3.7    A/G Ratio      1.0 - 2.0  1.1    Patient Fasting for CMP? Yes    Color Urine      Yellow  Light-Yellow    Clarity Urine      Clear  Clear    Spec Gravity      1.005 - 1.030  1.012    Glucose Urine      Normal mg/dL Normal    Bilirubin Urine      Negative  Negative    Ketones, UA      Negative mg/dL Negative    Blood Urine      Negative  Trace !    PH Urine      5.0 - 8.0  7.0    Protein Urine      Negative mg/dL 20 !    Urobilinogen Urine      Normal  Normal    Nitrite Urine      Negative  Negative    Leukocyte Esterase       Negative  Negative    WBC Urine      0 - 5 /HPF 1-5    RBC Urine      0 - 2 /HPF 0-2    Bacteria Urine      None Seen /HPF None Seen    SQUAM EPI CELLS UR      None Seen /HPF None Seen    RENAL TUBULAR EPITHELIAL CELLS      None Seen /HPF None Seen    TRANSITIONAL EPI CELLS      None Seen /HPF None Seen    YEAST URINE      None Seen /HPF None Seen    WBC      4.0 - 11.0 x10(3) uL 5.3    RBC      3.80 - 5.30 x10(6)uL 4.69    Hemoglobin      12.0 - 16.0 g/dL 14.0    Hematocrit      35.0 - 48.0 % 44.5    MCV      80.0 - 100.0 fL 94.9    MCH       26.0 - 34.0 pg 29.9    MCHC      31.0 - 37.0 g/dL 31.5    RDW      11.0 - 15.0 % 13.4    RDW-SD      35.1 - 46.3 fL 47.5 (H)    Platelet Count      150.0 - 450.0 10(3)uL 185.0    Cholesterol, Total      <200 mg/dL 151    HDL Cholesterol      40 - 59 mg/dL 92 (H)    Triglycerides      30 - 149 mg/dL 53    LDL Cholesterol Calc      <100 mg/dL 48    VLDL      0 - 30 mg/dL 7    NON-HDL CHOLESTEROL      <130 mg/dL 59    Patient Fasting for Lipid? Yes    MALB URINE      mg/dL 1.36    CREATININE UR RANDOM      mg/dL 105.00    MALB/CRE CALC      <=30.0 ug/mg 13.0    SED RATE      0 - 30 mm/Hr 11    C-REACTIVE PROTEIN      <0.30 mg/dL <0.29    TSH      0.358 - 3.740 mIU/mL 1.520    VITAMIN D, 25-OH, TOTAL      30.0 - 100.0 ng/mL 36.2       Legend:  (L) Low  ! Abnormal  (H) High  Component      Latest Ref Rn 2/5/2024   Color Urine      Yellow  Light-Yellow    Clarity Urine      Clear  Turbid !    Spec Gravity      1.005 - 1.030  1.015    Glucose Urine      Normal mg/dL Normal    Bilirubin Urine      Negative  Negative    Ketones, UA      Negative mg/dL Negative    Blood Urine      Negative  1+ !    PH Urine      5.0 - 8.0  6.5    Protein Urine      Negative mg/dL 20 !    Urobilinogen Urine      Normal  Normal    Nitrite Urine      Negative  Negative    Leukocyte Esterase       Negative  Negative    WBC Urine      0 - 5 /HPF 1-5    RBC Urine      0 - 2 /HPF 0-2    Bacteria Urine      None Seen /HPF None Seen    SQUAM EPI CELLS UR      None Seen /HPF None Seen    RENAL TUBULAR EPITHELIAL CELLS      None Seen /HPF None Seen    TRANSITIONAL EPI CELLS      None Seen /HPF None Seen    YEAST URINE      None Seen /HPF None Seen    WBC      4.0 - 11.0 x10(3) uL 2.9 (L)    RBC      3.80 - 5.30 x10(6)uL 4.51    Hemoglobin      12.0 - 16.0 g/dL 13.4    Hematocrit      35.0 - 48.0 % 42.7    MCV      80.0 - 100.0 fL 94.7    MCH      26.0 - 34.0 pg 29.7    MCHC      31.0 - 37.0 g/dL 31.4    RDW      11.0 - 15.0 % 13.4    RDW-SD      35.1  - 46.3 fL 47.3 (H)    Platelet Count      150.0 - 450.0 10(3)uL 152.0    MALB URINE      mg/dL 0.70       Legend:  ! Abnormal  (L) Low  (H) High      8/22/2016 - right knee xray   Mild/early degenerative changes in the patellofemoral compartment with  trace effusion.     Mild prepatellar soft tissue swelling.  5/21/2016 - cxr -   1. Tortuous aorta otherwise normal chest unchanged from January 28, 2013.    11/4/2016 - lumbar spine xray   1. Minimal osteoarthritis.  2. Minimal osteitis condensans ilii.  11/4/2016 - pelvis xray - no acute disease     11/12/2020 -   Chest xray   CONCLUSION:   1. Multifocal pneumonitis versus atypical viral pneumonia most pronounced in the left apex..    2. [id number] to interval resolution.      6/20/2020 - 2decho  Study Conclusions   1. Left ventricle: The cavity size was mildly dilated. Wall      thickness was normal. Systolic function was normal. The      estimated ejection fraction was 65%, by biplane method of disks.      Wall motion was normal; there were no regional wall motion      abnormalities. Left ventricular diastolic function parameters      were normal.   2. Aortic valve: Mild regurgitation.   3. Mitral valve: Mild regurgitation.   4. Left atrium: The atrium was mildly dilated.   5. Impressions: Normal pulmonary artery pressure.   Compared to the prior study there has been no significant interval   Change      11/18/2020 - chesty xray   CONCLUSION:      Interval improvement of previously described left upper lobe opacity with mild residual opacity remaining.  Recommend continued short-term follow-up chest radiographs to document imaging resolution.         1. SLE (systemic lupus erythematosus) (HCC)  1. 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 Smith, this doesn't  seem to be from SLE - lupus seems in remission   -  cont. hydroxychlrooquine 200mg bid  -   - she's avoiding celebrexa and ibupofen - she can call for script - she can take as needed for upper back pain - a, she willt ry tyelno.   - ok to take ibuprofen as needed for joitn pain -   - off azathiorpin 50mg a day now -but can add back if joint pain increases   - taking omega 3 - in cresae to 2000mg a day -   - mild porteinuria - has lori d- has seen dr. paniagua in the past   - headache is 2 days a week - bulbital given - hx of migraines - it helps her -   = echo 6/2020 - normal  - if joint pain - is wrose - can add back azathioprine   In the past:   - flared after  viral infection on 11/12/2020 - -leukopenia and slightly low platelets now with high fever and pneumonitis on chest xray ,  better after  medrol torres -- tyelnol as needed -  And s/pt prednisone 5mg a day. X 1month   -      2. Eye exam done Normal,11/2019 - eye exam in 12/2022 - , getting appt. In 6/2024 -   3. Vaginal bleeding - s/p hysterectomy 2/2013 - hb is stable.   4. righ tknee pain - declines steroid inejction , - cont. Home exercises learned in pt - tylenol arthriits for pain ,voltaren gel 1% -   5. Lower back pain - and now upper back pain - celebrx as needed  -  off cymbalta 60mg ad ya - per dr. Price.   6. Long d/w pt - that she can get the covid vaccine   7. Plantar fasciiits - both feet - exercises given, orthotics recommeneded.  better  8 .hx of  left sided TMJ - now off of  baclofen  Or cyclobenzaprine 5mg at night - f/u with ent or neurology - improved     Summary:  1.  Try tyelnol -arthritis  and ibuprofen  for hands and lower back pain and upper back pain   2.. Cont. hydroxychlrqouine 200mg twice a day    3. Check labs every 6 months.   4. Return to clinic in 6 months -   5. Take fish oil and turmeric -  - try increasing omega 3 to 2000mg a day - to see if this helps the joint pain   6. Try turmeric     She will call about if she wants PT  for right knee pain   If she is still losing weight she will talk to Dr. Smith        She decliens PT at this time for her lower back.     Rafael Ohara MD  2/7/2024   11:00 AM

## 2024-02-07 NOTE — PATIENT INSTRUCTIONS
1.  Try tyelnol -arthritis  and ibuprofen  for hands and lower back pain and upper back pain   2.. Cont. hydroxychlrqouine 200mg twice a day    3. Check labs every 6 months.   4. Return to clinic in 6 months -   5. Take fish oil and turmeric -  - try increasing omega 3 to 2000mg a day - to see if this helps the joint pain   6. Try turmeric

## 2024-02-12 ENCOUNTER — TELEPHONE (OUTPATIENT)
Dept: NEPHROLOGY | Facility: CLINIC | Age: 59
End: 2024-02-12

## 2024-02-12 DIAGNOSIS — Z87.39 H/O LUPUS NEPHRITIS: Primary | ICD-10-CM

## 2024-02-12 NOTE — TELEPHONE ENCOUNTER
Spoke with patient notify of note below, verbalize understanding .  Make appointments to F/U ON 2/23/24   pt like to know if need more labs before appointment

## 2024-02-12 NOTE — TELEPHONE ENCOUNTER
----- Message from Earl English MD sent at 2/11/2024 12:53 PM CST -----  Please call patient regarding test results.  Not reading MyChart.  All these messages have been jumbled up by different doctors but should see me soon for yearly follow-up.

## 2024-02-12 NOTE — TELEPHONE ENCOUNTER
Mild protein but the microalbumin/creatinine ratio is good - no protein so I think that your kidneys are good - no issues.  Reviewed labs ordered by Dr. BELLO.  Kidneys still look pretty good but please see me soon for yearly follow-up.   Written by Earl English MD on 2/6/2024 10:51 AM CST

## 2024-02-19 ENCOUNTER — HOSPITAL ENCOUNTER (OUTPATIENT)
Dept: GENERAL RADIOLOGY | Facility: HOSPITAL | Age: 59
Discharge: HOME | End: 2024-02-19
Attending: EMERGENCY MEDICINE

## 2024-02-19 ENCOUNTER — OFFICE VISIT (OUTPATIENT)
Dept: OTHER | Facility: HOSPITAL | Age: 59
End: 2024-02-19
Attending: EMERGENCY MEDICINE

## 2024-02-19 DIAGNOSIS — R52 PAIN: ICD-10-CM

## 2024-02-19 DIAGNOSIS — R52 PAIN: Primary | ICD-10-CM

## 2024-02-19 PROCEDURE — 73110 X-RAY EXAM OF WRIST: CPT | Performed by: EMERGENCY MEDICINE

## 2024-02-26 ENCOUNTER — APPOINTMENT (OUTPATIENT)
Dept: OTHER | Facility: HOSPITAL | Age: 59
End: 2024-02-26
Attending: EMERGENCY MEDICINE

## 2024-04-09 ENCOUNTER — NURSE TRIAGE (OUTPATIENT)
Dept: FAMILY MEDICINE CLINIC | Facility: CLINIC | Age: 59
End: 2024-04-09

## 2024-04-09 NOTE — TELEPHONE ENCOUNTER
With  ID # [id number] Daniela            Adarsh Requested: Summary for Provider     []  Critical Lab, Recommendations Needed  [x] Need Additional Advice  []   FYI    []   Need Orders  [] Need Medications Sent to Pharmacy  []  Other     SUMMARY: Patient asking for appointment only with , none available, patient also asking for a referral to Dr Bales for this problem. Please advise.      Patient states she has a dark \"leigh\" mole on her neck/collar bone area,size of a bean. It hurts \"a little\" when she touches it. Patient states it gets a crust and then oozes. This started about 1 year ago initially as a mole.     Patient insist  is correct doctor for this problem, as she has seen him in the past for a different problem.  Patient advised an office visit, but refused to see anyone but , no available appointment until May. Please advise.    Reason for call: Moles  Onset: 1 year      Reason for Disposition   Skin growth or mole and changes color or has more than one color    Protocols used: Skin Lesion - Moles or Growths-A-OH

## 2024-04-15 NOTE — TELEPHONE ENCOUNTER
Patient contacted in Lithuanian and made aware of referral placed. She has already scheduled visit with ENT. Patient verbalized understanding. No further questions or concerns at this time.    Future Appointments   Date Time Provider Department Center   4/25/2024  3:50 PM [facility]

## 2024-04-25 ENCOUNTER — OFFICE VISIT (OUTPATIENT)
Dept: OTOLARYNGOLOGY | Facility: CLINIC | Age: 59
End: 2024-04-25

## 2024-04-25 DIAGNOSIS — L98.9 LESION OF NECK: Primary | ICD-10-CM

## 2024-04-25 PROCEDURE — 99213 OFFICE O/P EST LOW 20 MIN: CPT | Performed by: OTOLARYNGOLOGY

## 2024-04-26 ENCOUNTER — TELEPHONE (OUTPATIENT)
Dept: OTOLARYNGOLOGY | Facility: CLINIC | Age: 59
End: 2024-04-26

## 2024-04-26 DIAGNOSIS — L98.9 LESION OF NECK: Primary | ICD-10-CM

## 2024-04-26 NOTE — PROGRESS NOTES
Mary Carmen Viera is a 58 year old female.    Chief Complaint   Patient presents with    Warts     Patient is here due to wart on the right ride of neck. Reports  increasing in size. Reports some pain irritation.        HISTORY OF PRESENT ILLNESS  He presents today with a history of enlarging lesion of the right neck.  She states that this becomes hard at times and she will scrape it off and will note subsequent bleeding from the structure.  No other signs, symptoms or complaints.  Here for further evaluation and management.      Social History     Socioeconomic History    Marital status:    Tobacco Use    Smoking status: Never    Smokeless tobacco: Never   Vaping Use    Vaping status: Never Used   Substance and Sexual Activity    Alcohol use: Yes     Alcohol/week: 2.0 standard drinks of alcohol     Types: 2 Cans of beer per week     Comment: occasionally    Drug use: No    Sexual activity: Never     Birth control/protection: Hysterectomy   Other Topics Concern    Caffeine Concern Yes     Comment: 3cups coffee, soda, tea    Pt has a pacemaker No    Pt has a defibrillator No    Reaction to local anesthetic No       Family History   Problem Relation Age of Onset    Other (Other) Father         per ng prostatitis    No Known Problems Mother     Diabetes Sister     Diabetes Brother     No Known Problems Son     No Known Problems Maternal Grandmother     No Known Problems Maternal Grandfather     No Known Problems Paternal Grandmother     No Known Problems Paternal Grandfather     No Known Problems Self     No Known Problems Daughter     No Known Problems Maternal Aunt     No Known Problems Paternal Aunt     No Known Problems Maternal Cousin Female     No Known Problems Maternal Cousin Male     No Known Problems Paternal Cousin Female     No Known Problems Paternal Cousin Male     No Known Problems Other     Glaucoma Neg     Macular degeneration Neg     Breast Cancer Neg     Ovarian Cancer Neg     DCIS Neg     LCIS  Neg     BRCA gene + Neg     Ashkenazi Hinduism Descent Neg        Past Medical History:    Age-related nuclear cataract of both eyes    Anemia    Arthritis    Back problem    Dry eye    per ng schirmer 10mm/5mm    Early cataract    History of blood transfusion    2 units    Lipid screening    per NG    Long-term use of Plaquenil    per ng w/ no toxicity    Lupus (HCC)    per ng sistemic lupus erythematous ; prednison & plaquenil; lupus nephritis nephrotic range proteinuria    Meibomian gland dysfunction    SLE (systemic lupus erythematosus) (HCC)    Prednisone and Plaquenil       Past Surgical History:   Procedure Laterality Date    Blood transfusions  2013    per ng 2 unit    Cataract  2013    per ng early    Colonoscopy N/A 11/16/2018    Procedure: COLONOSCOPY;  Surgeon: Buddy Regan MD;  Location: [facility] ENDOSCOPY    Hysterectomy  2013    [place] teeth removed  02/2021         REVIEW OF SYSTEMS    System Neg/Pos Details   Constitutional Negative Fatigue, fever and weight loss.   ENMT Negative Drooling.   Eyes Negative Blurred vision and vision changes.   Respiratory Negative Dyspnea and wheezing.   Cardio Negative Chest pain, irregular heartbeat/palpitations and syncope.   GI Negative Abdominal pain and diarrhea.   Endocrine Negative Cold intolerance and heat intolerance.   Neuro Negative Tremors.   Psych Negative Anxiety and depression.   Integumentary Negative Frequent skin infections, pigment change and rash.   Hema/Lymph Negative Easy bleeding and easy bruising.           PHYSICAL EXAM    There were no vitals taken for this visit.       Constitutional Normal Overall appearance - Normal.   Psychiatric Normal Orientation - Oriented to time, place, person & situation. Appropriate mood and affect.   Neck Exam Normal Inspection - Normal. Palpation - Normal. Parotid gland - Normal. Thyroid gland - Normal.   Eyes Normal Conjunctiva - Right: Normal, Left: Normal. Pupil - Right: Normal, Left: Normal. Fundus - Right:  Normal, Left: Normal.   Neurological Normal Memory - Normal. Cranial nerves - Cranial nerves II through XII grossly intact.   Head/Face Normal Facial features - Normal. Eyebrows - Normal. Skull - Normal.        Nasopharynx Normal External nose - Normal. Lips/teeth/gums - Normal. Tonsils - Normal. Oropharynx - Normal.   Ears Normal Inspection - Right: Normal, Left: Normal. Canal - Right: Normal, Left: Normal. TM - Right: Normal, Left: Normal.   Skin Normal Inspection -1 cm right neck lesion suspicious for benign nevus.  Currently with a blood scab.        Lymph Detail Normal Submental. Submandibular. Anterior cervical. Posterior cervical. Supraclavicular.        Nose/Mouth/Throat Normal External nose - Normal. Lips/teeth/gums - Normal. Tonsils - Normal. Oropharynx - Normal.   Nose/Mouth/Throat Normal Nares - Right: Normal Left: Normal. Septum -Normal  Turbinates - Right: Normal, Left: Normal.       Current Outpatient Medications:     methylPREDNISolone 4 MG Oral Tab, Take 1 tablet (4 mg total) by mouth daily. FOR 6 DAYS (Patient not taking: Reported on 4/25/2024), Disp: , Rfl:     ibuprofen 800 MG Oral Tab, Take 1 tablet (800 mg total) by mouth every 6 (six) hours as needed for Pain. (Patient not taking: Reported on 4/25/2024), Disp: , Rfl:     amoxicillin 500 MG Oral Cap, Take 1 capsule (500 mg total) by mouth As Directed. After DENTAL procedure (Patient not taking: Reported on 2/7/2024), Disp: , Rfl:     hydroxychloroquine 200 MG Oral Tab, Take 1 tablet (200 mg total) by mouth 2 (two) times daily., Disp: 180 tablet, Rfl: 2    dicyclomine 10 MG Oral Cap, Take 1 capsule (10 mg total) by mouth daily as needed. (Patient not taking: Reported on 2/7/2024), Disp: 15 capsule, Rfl: 0    Omega-3 Fatty Acids (FISH OIL OR), Take by mouth daily., Disp: , Rfl:     Vitamin D3 1000 units Oral Tab, Take 1 tablet (1,000 Units total) by mouth 2 (two) times daily., Disp: , Rfl:     Calcium Carbonate-Vitamin D 500-200 MG-UNIT Oral Tab,  Take 1 tablet by mouth daily. Take 2 tabs. Every day, Disp: , Rfl:   ASSESSMENT AND PLAN    1. Lesion of neck  She has a 1 cm lesion of the right neck.  Appears to have a bloody scab but this may be from her recently picking at it.  I did recommend leaving it alone and allowing it to heal and further removal so that she does not have any issues with that in the future.  We discussed excising this under local anesthesia in the surgery center setting.  She understands the risk of surgery to include but not be limited to postoperative pain, bleeding as well as poor cosmesis.  She accepts these risks and wishes to proceed        This note was prepared using Dragon Medical voice recognition dictation software. As a result errors may occur. When identified these errors have been corrected. While every attempt is made to correct errors during dictation discrepancies may still exist    Puma Bales MD    4/25/2024    9:39 PM

## 2024-04-26 NOTE — TELEPHONE ENCOUNTER
Patient scheduled for EXCISION AND INTERMEDIATE CLOSURE RIGHT NECK LESION on 5/15/24 at [facility].     English Interpretor  #[id number]

## 2024-05-09 ENCOUNTER — TELEPHONE (OUTPATIENT)
Dept: RHEUMATOLOGY | Facility: CLINIC | Age: 59
End: 2024-05-09

## 2024-05-09 NOTE — TELEPHONE ENCOUNTER
Returned pt call; verified name and . Pt having a right neck lesion excision next week. Surgeon's office directed her to stop all vitamins pre-op and that ok to continue hydroxychloroquine. Informed pt she should follow surgeon's recommendation regarding holding medications. Pt verbalized understanding.

## 2024-05-09 NOTE — TELEPHONE ENCOUNTER
Patient called stating she is scheduled for a procedure next week. She is calling regarding questions about her medications and vitamins she is taking. She wants to know if she needs to hold any of the medication or if she can continue taking them.

## 2024-05-10 ENCOUNTER — TELEPHONE (OUTPATIENT)
Dept: OTOLARYNGOLOGY | Facility: CLINIC | Age: 59
End: 2024-05-10

## 2024-05-10 NOTE — TELEPHONE ENCOUNTER
Left voice message for patient to call back regarding rescheduling procedure.     [place] Interpretor #[id number]

## 2024-05-13 NOTE — TELEPHONE ENCOUNTER
Left voice message for patient to call back regarding rescheduling procedure.     [place] Interpretor #[id number]

## 2024-05-15 PROBLEM — L98.9 BENIGN SKIN LESION OF NECK: Status: RESOLVED | Noted: 2024-05-15 | Resolved: 2024-05-15

## 2024-05-15 PROBLEM — L98.9 BENIGN SKIN LESION OF NECK: Status: ACTIVE | Noted: 2024-05-15

## 2024-05-15 PROCEDURE — 88305 TISSUE EXAM BY PATHOLOGIST: CPT | Performed by: OTOLARYNGOLOGY

## 2024-05-16 ENCOUNTER — TELEPHONE (OUTPATIENT)
Dept: OTOLARYNGOLOGY | Facility: CLINIC | Age: 59
End: 2024-05-16

## 2024-05-16 RX ORDER — HYDROXYCHLOROQUINE SULFATE 200 MG/1
200 TABLET, FILM COATED ORAL 2 TIMES DAILY
Qty: 180 TABLET | Refills: 3 | Status: SHIPPED | OUTPATIENT
Start: 2024-05-16

## 2024-05-16 NOTE — TELEPHONE ENCOUNTER
Pt is excision and intermediate closure right neck lesion. Per pt incision is dry and intact, no bleeding or fever, pt taking antibiotic as prescribed. Pt will contact office if symptoms change or worsen (drainage from incision. Pt verbalized understanding.

## 2024-05-16 NOTE — TELEPHONE ENCOUNTER
Requested Prescriptions     Pending Prescriptions Disp Refills    hydroxychloroquine 200 MG Oral Tab 180 tablet 2     Sig: Take 1 tablet (200 mg total) by mouth 2 (two) times daily.     Future Appointments   Date Time Provider Department [place]   5/24/2024  9:00 AM [facility]   6/26/2024  9:00 AM [facility]   8/7/2024 10:40 AM [facility]     LOV: 2/7/24   Last Refilled:2/7/24 #180 2 RF   Labs:  Component      Latest Ref Rn 2/5/2024   Color Urine      Yellow  Light-Yellow    Clarity Urine      Clear  Turbid !    Spec Gravity      1.005 - 1.030  1.015    Glucose Urine      Normal mg/dL Normal    Bilirubin Urine      Negative  Negative    Ketones, UA      Negative mg/dL Negative    Blood Urine      Negative  1+ !    PH Urine      5.0 - 8.0  6.5    Protein Urine      Negative mg/dL 20 !    Urobilinogen Urine      Normal  Normal    Nitrite Urine      Negative  Negative    Leukocyte Esterase       Negative  Negative    WBC Urine      0 - 5 /HPF 1-5    RBC Urine      0 - 2 /HPF 0-2    Bacteria Urine      None Seen /HPF None Seen    SQUAM EPI CELLS UR      None Seen /HPF None Seen    RENAL TUBULAR EPITHELIAL CELLS      None Seen /HPF None Seen    TRANSITIONAL EPI CELLS      None Seen /HPF None Seen    YEAST URINE      None Seen /HPF None Seen    WBC      4.0 - 11.0 x10(3) uL 2.9 (L)    RBC      3.80 - 5.30 x10(6)uL 4.51    Hemoglobin      12.0 - 16.0 g/dL 13.4    Hematocrit      35.0 - 48.0 % 42.7    MCV      80.0 - 100.0 fL 94.7    MCH      26.0 - 34.0 pg 29.7    MCHC      31.0 - 37.0 g/dL 31.4    RDW      11.0 - 15.0 % 13.4    RDW-SD      35.1 - 46.3 fL 47.3 (H)    Platelet Count      150.0 - 450.0 10(3)uL 152.0    MALB URINE      mg/dL 0.70    CREATININE UR RANDOM      mg/dL 103.60    MALB/CRE CALC      <=30.0 ug/mg 6.8    CREATININE      0.55 - 1.02 mg/dL 0.63    EGFR      >=60 mL/min/1.73m2 103    SED RATE      0 - 30 mm/Hr 5     C-REACTIVE PROTEIN      <1.00 mg/dL <0.40    ALT (SGPT)      10 - 49 U/L 12    AST (SGOT)      <=34 U/L 18    VITAMIN D, 25-OH, TOTAL      30.0 - 100.0 ng/mL 44.6       Legend:  ! Abnormal  (L) Low  (H) High    Summary:  1.  Try tyelnol -arthritis  and ibuprofen  for hands and lower back pain and upper back pain   2.. Cont. hydroxychlrqouine 200mg twice a day    3. Check labs every 6 months.   4. Return to clinic in 6 months -   5. Take fish oil and turmeric -  - try increasing omega 3 to 2000mg a day - to see if this helps the joint pain   6. Try turmeric      She will call about if she wants PT for right knee pain   If she is still losing weight she will talk to Dr. Smith           She decliens PT at this time for her lower back.      Rafael Ohara MD  2/7/2024   11:00 AM

## 2024-05-16 NOTE — TELEPHONE ENCOUNTER
Patient called requesting refills on the following medication:        hydroxychloroquine 200 MG Oral Tab       Per patient she would like these refills to go to the following pharmacy:    [facility]    [address]    [place] [id number]    Also, patient is requesting a 3 month supply.

## 2024-05-21 ENCOUNTER — TELEPHONE (OUTPATIENT)
Dept: FAMILY MEDICINE CLINIC | Facility: CLINIC | Age: 59
End: 2024-05-21

## 2024-05-21 DIAGNOSIS — Z12.31 SCREENING MAMMOGRAM FOR BREAST CANCER: Primary | ICD-10-CM

## 2024-05-21 NOTE — TELEPHONE ENCOUNTER
Last mammogram 7/10/23  RECOMMENDATIONS:   ROUTINE MAMMOGRAM AND CLINICAL EVALUATION IN 12 MONTHS.

## 2024-05-24 ENCOUNTER — OFFICE VISIT (OUTPATIENT)
Dept: OTOLARYNGOLOGY | Facility: CLINIC | Age: 59
End: 2024-05-24

## 2024-05-24 DIAGNOSIS — L98.9 LESION OF NECK: Primary | ICD-10-CM

## 2024-05-24 PROCEDURE — 99024 POSTOP FOLLOW-UP VISIT: CPT | Performed by: OTOLARYNGOLOGY

## 2024-05-24 NOTE — PROGRESS NOTES
Mary Carmen Viera is a 58 year old female.    Chief Complaint   Patient presents with    Post-Op     Patient is here due to EXCISION AND INTERMEDIATE CLOSURE RIGHT NECK LESION post op       HISTORY OF PRESENT ILLNESS  He presents today with a history of enlarging lesion of the right neck.  She states that this becomes hard at times and she will scrape it off and will note subsequent bleeding from the structure.  No other signs, symptoms or complaints.  Here for further evaluation and management.     5/24/24 she is now 8 days out from excision of a lesion of her right neck which path revealed to be a benign inflamed seborrheic keratosis.  Here for suture and dressing removal.  Path reviewed with patient      Social History     Socioeconomic History    Marital status:    Tobacco Use    Smoking status: Never    Smokeless tobacco: Never   Vaping Use    Vaping status: Never Used   Substance and Sexual Activity    Alcohol use: Yes     Alcohol/week: 2.0 standard drinks of alcohol     Types: 2 Cans of beer per week     Comment: occasionally    Drug use: No    Sexual activity: Never     Birth control/protection: Hysterectomy   Other Topics Concern    Caffeine Concern Yes     Comment: 3cups coffee, soda, tea    Pt has a pacemaker No    Pt has a defibrillator No    Reaction to local anesthetic No       Family History   Problem Relation Age of Onset    Other (Other) Father         per ng prostatitis    No Known Problems Mother     Diabetes Sister     Diabetes Brother     No Known Problems Son     No Known Problems Maternal Grandmother     No Known Problems Maternal Grandfather     No Known Problems Paternal Grandmother     No Known Problems Paternal Grandfather     No Known Problems Self     No Known Problems Daughter     No Known Problems Maternal Aunt     No Known Problems Paternal Aunt     No Known Problems Maternal Cousin Female     No Known Problems Maternal Cousin Male     No Known Problems Paternal Cousin Female      No Known Problems Paternal Cousin Male     No Known Problems Other     Glaucoma Neg     Macular degeneration Neg     Breast Cancer Neg     Ovarian Cancer Neg     DCIS Neg     LCIS Neg     BRCA gene + Neg     Ashkenazi Sabianism Descent Neg        Past Medical History:    Age-related nuclear cataract of both eyes    Anemia    Arthritis    Back problem    Dry eye    per ng schirmer 10mm/5mm    Early cataract    History of blood transfusion    2 units    Lipid screening    per NG    Long-term use of Plaquenil    per ng w/ no toxicity    Lupus (HCC)    per ng sistemic lupus erythematous ; prednison & plaquenil; lupus nephritis nephrotic range proteinuria    Meibomian gland dysfunction    SLE (systemic lupus erythematosus) (HCC)    Prednisone and Plaquenil       Past Surgical History:   Procedure Laterality Date    Blood transfusions  2013    per ng 2 unit    Cataract  2013    per ng early    Colonoscopy N/A 11/16/2018    Procedure: COLONOSCOPY;  Surgeon: Buddy Regan MD;  Location: [facility] ENDOSCOPY    Hysterectomy  2013    [place] teeth removed  02/2021         REVIEW OF SYSTEMS    System Neg/Pos Details   Constitutional Negative Fatigue, fever and weight loss.   ENMT Negative Drooling.   Eyes Negative Blurred vision and vision changes.   Respiratory Negative Dyspnea and wheezing.   Cardio Negative Chest pain, irregular heartbeat/palpitations and syncope.   GI Negative Abdominal pain and diarrhea.   Endocrine Negative Cold intolerance and heat intolerance.   Neuro Negative Tremors.   Psych Negative Anxiety and depression.   Integumentary Negative Frequent skin infections, pigment change and rash.   Hema/Lymph Negative Easy bleeding and easy bruising.           PHYSICAL EXAM    There were no vitals taken for this visit.       Constitutional Normal Overall appearance - Normal.   Psychiatric Normal Orientation - Oriented to time, place, person & situation. Appropriate mood and affect.   Neck Exam Normal Inspection - Normal.  Palpation - Normal. Parotid gland - Normal. Thyroid gland - Normal.   Eyes Normal Conjunctiva - Right: Normal, Left: Normal. Pupil - Right: Normal, Left: Normal. Fundus - Right: Normal, Left: Normal.   Neurological Normal Memory - Normal. Cranial nerves - Cranial nerves II through XII grossly intact.   Head/Face Normal Facial features - Normal. Eyebrows - Normal. Skull - Normal.        Nasopharynx Normal External nose - Normal. Lips/teeth/gums - Normal. Tonsils - Normal. Oropharynx - Normal.   Ears Normal Inspection - Right: Normal, Left: Normal. Canal - Right: Normal, Left: Normal. TM - Right: Normal, Left: Normal.   Skin Normal Inspection - Normal.        Lymph Detail Normal Submental. Submandibular. Anterior cervical. Posterior cervical. Supraclavicular.   Incision  Clean dry and intact   Nose/Mouth/Throat Normal External nose - Normal. Lips/teeth/gums - Normal. Tonsils - Normal. Oropharynx - Normal.   Nose/Mouth/Throat Normal Nares - Right: Normal Left: Normal. Septum -Normal  Turbinates - Right: Normal, Left: Normal.       Current Outpatient Medications:     hydroxychloroquine 200 MG Oral Tab, Take 1 tablet (200 mg total) by mouth 2 (two) times daily., Disp: 180 tablet, Rfl: 3    cephalexin 500 MG Oral Cap, Take 1 capsule (500 mg total) by mouth every 8 (eight) hours., Disp: 21 capsule, Rfl: 0    Omega-3 Fatty Acids (FISH OIL OR), Take by mouth daily., Disp: , Rfl:     Vitamin D3 1000 units Oral Tab, Take 1 tablet (1,000 Units total) by mouth 2 (two) times daily., Disp: , Rfl:     Calcium Carbonate-Vitamin D 500-200 MG-UNIT Oral Tab, Take 1 tablet by mouth daily. Take 2 tabs. Every day, Disp: , Rfl:   ASSESSMENT AND PLAN    1. Lesion of neck  Doing very well healing very nicely wound care discussed and understood        This note was prepared using Dragon Medical voice recognition dictation software. As a result errors may occur. When identified these errors have been corrected. While every attempt is made to  correct errors during dictation discrepancies may still exist    Puma Bales MD    5/24/2024    11:10 AM

## 2024-06-26 ENCOUNTER — OFFICE VISIT (OUTPATIENT)
Dept: OPHTHALMOLOGY | Facility: CLINIC | Age: 59
End: 2024-06-26

## 2024-06-26 DIAGNOSIS — Z79.899 LONG-TERM USE OF PLAQUENIL: Primary | ICD-10-CM

## 2024-06-26 DIAGNOSIS — H04.123 CHRONIC DRYNESS OF BOTH EYES: ICD-10-CM

## 2024-06-26 DIAGNOSIS — H25.13 AGE-RELATED NUCLEAR CATARACT OF BOTH EYES: ICD-10-CM

## 2024-06-26 PROBLEM — H00.11 CHALAZION OF RIGHT UPPER EYELID: Status: RESOLVED | Noted: 2018-11-01 | Resolved: 2024-06-26

## 2024-06-26 PROCEDURE — 92014 COMPRE OPH EXAM EST PT 1/>: CPT | Performed by: OPHTHALMOLOGY

## 2024-06-26 NOTE — PROGRESS NOTES
Mary Carmen Viera is a 58 year old female.    HPI:     HPI    Here for a Plaquenil EE. Pt is taking 200mg of Plaquenil bid twice a day for Lupus. Dx in 2008. Pt has been seeing Dr. Gross every 6 - 8 months. Pt denies any blurred vision and is happy with her glasses. Pt is not using any eye drops. Pt has a lot of  dry eyes.  Last edited by Stacie Sim OT on 6/26/2024  9:15 AM.        Patient History:  Past Medical History:    Age-related nuclear cataract of both eyes    Anemia    Arthritis    Back problem    Dry eye    per ng schirmer 10mm/5mm    Early cataract    History of blood transfusion    2 units    Lipid screening    per NG    Long-term use of Plaquenil    per ng w/ no toxicity    Lupus (HCC)    per ng sistemic lupus erythematous ; prednison & plaquenil; lupus nephritis nephrotic range proteinuria    Meibomian gland dysfunction    SLE (systemic lupus erythematosus) (HCC)    Prednisone and Plaquenil       Surgical History: Mary Carmen Viera has a past surgical history that includes blood transfusions (2013) (per ng 2 unit); cataract (2013) (per ng early); hysterectomy (2013); colonoscopy (N/A, 11/16/2018) (Procedure: COLONOSCOPY;  Surgeon: Buddy Regan MD;  Location: [facility] ENDOSCOPY); and wisdom teeth removed (02/2021).    Family History   Problem Relation Age of Onset    Other (Other) Father         per  prostatitis    No Known Problems Mother     Diabetes Sister     Diabetes Brother     No Known Problems Son     No Known Problems Maternal Grandmother     No Known Problems Maternal Grandfather     No Known Problems Paternal Grandmother     No Known Problems Paternal Grandfather     No Known Problems Self     No Known Problems Daughter     No Known Problems Maternal Aunt     No Known Problems Paternal Aunt     No Known Problems Maternal Cousin Female     No Known Problems Maternal Cousin Male     No Known Problems Paternal Cousin Female     No Known Problems Paternal Cousin Male     No Known  Problems Other     Glaucoma Neg     Macular degeneration Neg     Breast Cancer Neg     Ovarian Cancer Neg     DCIS Neg     LCIS Neg     BRCA gene + Neg     Ashkenazi Sabianist Descent Neg        Social History:   Social History     Socioeconomic History    Marital status:    Tobacco Use    Smoking status: Never    Smokeless tobacco: Never   Vaping Use    Vaping status: Never Used   Substance and Sexual Activity    Alcohol use: Yes     Alcohol/week: 2.0 standard drinks of alcohol     Types: 2 Cans of beer per week     Comment: occasionally    Drug use: No    Sexual activity: Never     Birth control/protection: Hysterectomy   Other Topics Concern    Caffeine Concern Yes     Comment: 3cups coffee, soda, tea    Pt has a pacemaker No    Pt has a defibrillator No    Reaction to local anesthetic No       Medications:  Current Outpatient Medications   Medication Sig Dispense Refill    hydroxychloroquine 200 MG Oral Tab Take 1 tablet (200 mg total) by mouth 2 (two) times daily. 180 tablet 3    cephalexin 500 MG Oral Cap Take 1 capsule (500 mg total) by mouth every 8 (eight) hours. 21 capsule 0    Omega-3 Fatty Acids (FISH OIL OR) Take by mouth daily.      Vitamin D3 1000 units Oral Tab Take 1 tablet (1,000 Units total) by mouth 2 (two) times daily.      Calcium Carbonate-Vitamin D 500-200 MG-UNIT Oral Tab Take 1 tablet by mouth daily. Take 2 tabs. Every day         Allergies:  No Known Allergies    ROS:     ROS    Positive for: Eyes  Last edited by Stacie Sim OT on 6/26/2024  8:58 AM.          PHYSICAL EXAM:     Base Eye Exam       Visual Acuity (Snellen - Linear)         Right Left    Dist cc 20/25 20/25 -1    Near cc 20/25 20/25      Correction: Glasses              Tonometry (Applanation, 9:13 AM)         Right Left    Pressure 14 14              Pupils         Pupils    Right PERRL    Left PERRL              Visual Fields         Left Right     Full Full              Extraocular Movement         Right Left      Full, Ortho Full, Ortho              Neuro/Psych       Oriented x3: Yes    Mood/Affect: Normal              Dilation       Both eyes: 1.0% Mydriacyl and 2.5% Alistair Synephrine @ 9:15 AM              Dilation #2       Both eyes: 1.0% Mydriacyl and 2.5% Alistair Synephrine @ 9:15 AM                  Additional Tests       Amsler         Right Left     Normal Normal              Color         Right Left    Ishihara 5/5 5/5                  Slit Lamp and Fundus Exam       Slit Lamp Exam         Right Left    Lids/Lashes Dermatochalasis, Meibomian gland dysfunction, oily lids Dermatochalasis, Meibomian gland dysfunction, oily lids    Conjunctiva/Sclera Nasal pinguecula, Ocular Melanosis, no follicles or papilla Nasal pinguecula, Ocular Melanosis, no follicles or papilla    Cornea 1+ arcus 1+ arcus, Salzmann's nodule at 9 o'clock    Anterior Chamber Deep and quiet Deep and quiet    Iris Normal Normal    Lens Trace Nuclear sclerosis Trace Nuclear sclerosis    Vitreous Clear Clear              Fundus Exam         Right Left    Disc Good rim, Temporal crescent Good rim, Temporal crescent    C/D Ratio 0.35 0.35    Macula Normal- no plaquenil toxicity Normal- no plaquenil toxicity    Vessels Normal Normal    Periphery Normal Normal                  Refraction       Wearing Rx         Sphere Cylinder Add    Right +2.00 Sphere +2.50    Left +1.75 Sphere +2.50      Age: 3yrs    Type: Progressive bifocal   Patient does not want a refraction.             Final Rx         Sphere Cylinder Add    Right +2.00 Sphere +2.50    Left +1.75 Sphere +2.50      Type: Progressive bifocal                     ASSESSMENT/PLAN:     Diagnoses and Plan:     Chronic dryness of both eyes  Patient was instructed to use warm compresses to the eyelids twice a day everyday.    Instructions for warm compress use:   Patient should place wash compresses on both eyelids for 5 minutes every morning and every night.  After 5 minutes of holding the warm compresses on  the eyelids, patient should gently rub the eyelashes and then rinse thoroughly with warm water.     Patient should also use artificial tears (any over the counter brand is okay) up to 4-6  times per day as needed for dry eye symptoms.        Age-related nuclear cataract of both eyes  Discussed mild cataracts in both eyes that are not affecting vision and are not surgical at this time.    Copy of last glasses RX given.     Long-term use of Plaquenil   No evidence of plaquenil toxicity in either eye.    Patient is approved to continue on plaquenil as directed by their PCP or rheumatologist.   Recommend yearly eye exams with an ophthalmologist due to Plaquenil use.     No orders of the defined types were placed in this encounter.      Meds This Visit:  Requested Prescriptions      No prescriptions requested or ordered in this encounter        Follow up instructions:  Return in about 1 year (around 6/26/2025) for Plaquenil eye exam.    6/26/2024  Scribed by: Abundio Peña MD

## 2024-06-26 NOTE — ASSESSMENT & PLAN NOTE
No evidence of plaquenil toxicity in either eye.    Patient is approved to continue on plaquenil as directed by their PCP or rheumatologist.   Recommend yearly eye exams with an ophthalmologist due to Plaquenil use.

## 2024-06-26 NOTE — PATIENT INSTRUCTIONS
Chronic dryness of both eyes  Patient was instructed to use warm compresses to the eyelids twice a day everyday.    Instructions for warm compress use:   Patient should place wash compresses on both eyelids for 5 minutes every morning and every night.  After 5 minutes of holding the warm compresses on the eyelids, patient should gently rub the eyelashes and then rinse thoroughly with warm water.     Patient should also use artificial tears (any over the counter brand is okay) up to 4-6  times per day as needed for dry eye symptoms.        Age-related nuclear cataract of both eyes  Discussed mild cataracts in both eyes that are not affecting vision and are not surgical at this time.    Copy of last glasses RX given.     Long-term use of Plaquenil   No evidence of plaquenil toxicity in either eye.    Patient is approved to continue on plaquenil as directed by their PCP or rheumatologist.   Recommend yearly eye exams with an ophthalmologist due to Plaquenil use.

## 2024-06-26 NOTE — ASSESSMENT & PLAN NOTE
Patient was instructed to use warm compresses to the eyelids twice a day everyday.    Instructions for warm compress use:   Patient should place wash compresses on both eyelids for 5 minutes every morning and every night.  After 5 minutes of holding the warm compresses on the eyelids, patient should gently rub the eyelashes and then rinse thoroughly with warm water.     Patient should also use artificial tears (any over the counter brand is okay) up to 4-6  times per day as needed for dry eye symptoms.

## 2024-06-26 NOTE — ASSESSMENT & PLAN NOTE
Discussed mild cataracts in both eyes that are not affecting vision and are not surgical at this time.    Copy of last glasses RX given.

## 2024-07-10 ENCOUNTER — HOSPITAL ENCOUNTER (OUTPATIENT)
Dept: MAMMOGRAPHY | Facility: HOSPITAL | Age: 59
Discharge: HOME | End: 2024-07-10
Attending: FAMILY MEDICINE
Payer: COMMERCIAL

## 2024-07-10 DIAGNOSIS — Z12.31 SCREENING MAMMOGRAM FOR BREAST CANCER: ICD-10-CM

## 2024-07-10 PROCEDURE — 77063 BREAST TOMOSYNTHESIS BI: CPT | Performed by: FAMILY MEDICINE

## 2024-07-10 PROCEDURE — 77067 SCR MAMMO BI INCL CAD: CPT | Performed by: FAMILY MEDICINE

## 2024-10-12 ENCOUNTER — TELEPHONE (OUTPATIENT)
Dept: NEPHROLOGY | Facility: CLINIC | Age: 59
End: 2024-10-12

## 2024-10-12 ENCOUNTER — LAB ENCOUNTER (OUTPATIENT)
Dept: LAB | Facility: HOSPITAL | Age: 59
End: 2024-10-12
Attending: INTERNAL MEDICINE
Payer: COMMERCIAL

## 2024-10-12 DIAGNOSIS — Z87.39 H/O LUPUS NEPHRITIS: ICD-10-CM

## 2024-10-12 DIAGNOSIS — Z87.39 H/O LUPUS NEPHRITIS: Primary | ICD-10-CM

## 2024-10-12 DIAGNOSIS — E55.9 VITAMIN D DEFICIENCY: ICD-10-CM

## 2024-10-12 DIAGNOSIS — M32.9 SYSTEMIC LUPUS ERYTHEMATOSUS, UNSPECIFIED SLE TYPE, UNSPECIFIED ORGAN INVOLVEMENT STATUS (HCC): ICD-10-CM

## 2024-10-12 DIAGNOSIS — Z51.81 THERAPEUTIC DRUG MONITORING: ICD-10-CM

## 2024-10-12 LAB
ALBUMIN SERPL-MCNC: 4.4 G/DL (ref 3.2–4.8)
ALBUMIN/GLOB SERPL: 1.6 {RATIO} (ref 1–2)
ALP LIVER SERPL-CCNC: 92 U/L
ALT SERPL-CCNC: 12 U/L
ANION GAP SERPL CALC-SCNC: 7 MMOL/L (ref 0–18)
AST SERPL-CCNC: 18 U/L (ref ?–34)
BILIRUB SERPL-MCNC: 1.1 MG/DL (ref 0.3–1.2)
BUN BLD-MCNC: 11 MG/DL (ref 9–23)
BUN/CREAT SERPL: 17.5 (ref 10–20)
CALCIUM BLD-MCNC: 9.3 MG/DL (ref 8.7–10.4)
CHLORIDE SERPL-SCNC: 108 MMOL/L (ref 98–112)
CO2 SERPL-SCNC: 29 MMOL/L (ref 21–32)
CREAT BLD-MCNC: 0.63 MG/DL
CRP SERPL-MCNC: <0.4 MG/DL (ref ?–1)
DEPRECATED RDW RBC AUTO: 45.6 FL (ref 35.1–46.3)
EGFRCR SERPLBLD CKD-EPI 2021: 103 ML/MIN/1.73M2 (ref 60–?)
ERYTHROCYTE [DISTWIDTH] IN BLOOD BY AUTOMATED COUNT: 13.5 % (ref 11–15)
ERYTHROCYTE [SEDIMENTATION RATE] IN BLOOD: 21 MM/HR
FASTING STATUS PATIENT QL REPORTED: YES
GLOBULIN PLAS-MCNC: 2.8 G/DL (ref 2–3.5)
GLUCOSE BLD-MCNC: 87 MG/DL (ref 70–99)
HCT VFR BLD AUTO: 42 %
HGB BLD-MCNC: 14.1 G/DL
MCH RBC QN AUTO: 30.7 PG (ref 26–34)
MCHC RBC AUTO-ENTMCNC: 33.6 G/DL (ref 31–37)
MCV RBC AUTO: 91.5 FL
OSMOLALITY SERPL CALC.SUM OF ELEC: 297 MOSM/KG (ref 275–295)
PHOSPHATE SERPL-MCNC: 3.9 MG/DL (ref 2.4–5.1)
PLATELET # BLD AUTO: 188 10(3)UL (ref 150–450)
POTASSIUM SERPL-SCNC: 4.1 MMOL/L (ref 3.5–5.1)
PROT SERPL-MCNC: 7.2 G/DL (ref 5.7–8.2)
RBC # BLD AUTO: 4.59 X10(6)UL
SODIUM SERPL-SCNC: 144 MMOL/L (ref 136–145)
WBC # BLD AUTO: 3.1 X10(3) UL (ref 4–11)

## 2024-10-12 PROCEDURE — 36415 COLL VENOUS BLD VENIPUNCTURE: CPT

## 2024-10-12 PROCEDURE — 82306 VITAMIN D 25 HYDROXY: CPT

## 2024-10-12 PROCEDURE — 86225 DNA ANTIBODY NATIVE: CPT

## 2024-10-12 PROCEDURE — 85027 COMPLETE CBC AUTOMATED: CPT

## 2024-10-12 PROCEDURE — 84100 ASSAY OF PHOSPHORUS: CPT

## 2024-10-12 PROCEDURE — 86140 C-REACTIVE PROTEIN: CPT

## 2024-10-12 PROCEDURE — 85652 RBC SED RATE AUTOMATED: CPT

## 2024-10-12 PROCEDURE — 80053 COMPREHEN METABOLIC PANEL: CPT

## 2024-10-12 NOTE — TELEPHONE ENCOUNTER
Reviewed labs that rheumatology ordered.  Kidney function remains normal but she also needs to do a urinalysis and urine for microalbumin.  And then see for follow-up.  Not seen since January 2022.

## 2024-10-14 ENCOUNTER — OFFICE VISIT (OUTPATIENT)
Dept: RHEUMATOLOGY | Facility: CLINIC | Age: 59
End: 2024-10-14
Payer: COMMERCIAL

## 2024-10-14 VITALS
DIASTOLIC BLOOD PRESSURE: 79 MMHG | HEIGHT: 63 IN | SYSTOLIC BLOOD PRESSURE: 130 MMHG | HEART RATE: 65 BPM | WEIGHT: 139 LBS | BODY MASS INDEX: 24.63 KG/M2

## 2024-10-14 DIAGNOSIS — E55.9 VITAMIN D DEFICIENCY: ICD-10-CM

## 2024-10-14 DIAGNOSIS — M32.9 SYSTEMIC LUPUS ERYTHEMATOSUS, UNSPECIFIED SLE TYPE, UNSPECIFIED ORGAN INVOLVEMENT STATUS (HCC): Primary | ICD-10-CM

## 2024-10-14 DIAGNOSIS — Z51.81 THERAPEUTIC DRUG MONITORING: ICD-10-CM

## 2024-10-14 LAB — VIT D+METAB SERPL-MCNC: 49.8 NG/ML (ref 30–100)

## 2024-10-14 PROCEDURE — 99214 OFFICE O/P EST MOD 30 MIN: CPT | Performed by: INTERNAL MEDICINE

## 2024-10-14 NOTE — PROGRESS NOTES
HPI:     Chief Complaint   Patient presents with    Follow - Up     Lupus       I had the pleasure of seeing Mrs. Mary Carmen Viera. . As you recall, she is a pleasant 55  year old who has a hx of SLE.  She's on plaquenil 200mg bid. She was on azathioprine and now off.     3/12/2018  The physical therapy helped her but she stil lhast hte pain. It's not as bad as before. She has 4/10 pain. She keeps doing the exercises.   She lost 6 pounds since her last visit.   She is moving more and the therapy exercises are doing better.   Her back is better. It's not completley 100% but it's better than before.     11/2/2018  She's doing ok. Nothing worse. She has 3/10 pain in her back. She has mid back pain in her kidneys.   She saw dr. morris yesterday - no hcq toxicity.   She occl has a few palpitations.     6/17/2019  hs'e doing ok. She soemtimes has a lot of pain in her upper back and wrists.   Also her ankles hurt both sides - but more in her right ankle. She has noticed this going on for 1 month now.   She has 2/10 pain.   Her mid back is still sore. She has trouble lifting heavy thing.s     3/16/2020  She has 4/10 pain in her low back and neck pain. She has some hand pain too but it's not bad.   seh has trouble lifting things stil - likely from work.   Her ankles are ok. But she has a little bit of pain in he rright ankel.   No rashes, no oral ulcers.   She works at the [employer]. She had a mild cough 1 month ago.   Sometimes she feels chest tight and sometimes tachycardia. It's nota ll the time.   Echo oin 2016 was normal.   Her white count is lower than normal at 3.4 in 1/2020.     11/16/2020  VIDEO VISIT  She feels bad. She has fever and headahes and chest pain  . All her joints hurt.   Her feet hurt. She had a bad headache from behind her ear to her other ear.   She was scared b/c she feels bad.  Her fever is around 100.8F and sometimes 102.6F   No sob. She feels mostly pain. She has body pain, her knees are  hurting. She has bad muscle pains as well.   She got covid testing last week and this was negative.   Then she went to the ER on 11/12/2020 - covid was run for the 2nd time and it was negative.   Rapid strep was negative.   She doesn't know what is happening.   Her recent labs on 11/12/2020 - showed low wbc of 3.0 and plt slightly low.   She was a little runny nose today.   Last night she had fevers.   She had viral pneumonitis in left upper lobe     12/14/2020   She has a viral pnemonitis in 11/2020. She feels achy and flared up.   She felt better on medrol torres and the pain was better until 1 week ago.   She has about 3/10 pain - it's in her upper back. shes' wondering if this is still lupus with the pain coming   She had follow up chest ray on 11/18 - that showed resolution of opacities.   She had general aches and the covid tests was negative.   She had labs on 11/18/2020 - sed rate 39mm/hr. And crp 1.2mg/dL,   She has microalbuminuria 840  Her creatinine is normal.   Her wbc is 2.9 with lypmhopenia.     9/15/2021  She's feels so so.   She had a accidental needle stick at work.   She was to get the hep b vaccine. Her last one is in January.   She was also checked for HIV this is negative and she will check again in 2/2022.     She went to her dentist in 2/2021 and gotten two wisdom teeth removed and she still have pain and swelling over the left cheek.   She is going to try accupuncutre with the chiropracter. She is going to see a neurologist for this pain . This pain comes and goes . The pain is not all the time but over her left jaw. She feels sensitive . No numbness or tingling. It's a sharp apin close to her ear. It's not so bad. She was dx with TMJ with her dentist and referred to PT.   She hasn't gone yet - she is thinking about chiropractic care instead. In the begnining she had numbnes over her right cheek , now it's better.     7/18/2022  She has pain in heir hands and she has more pain in her upper back  and mid back.   She has about 4/10 pain in her hands. Her back is around 3/10 pain - sometimes it can goto t6/10 pain.   She still has trigmeinal neuralgia over her left cheek. She got ct scan but ahns't gotten this yet.   She saw dr. Bales and given celebrex but it didn' thelp. Much.   She has more pain with hare hands with working and it's better on the weekend.     4/12/2023  She has a little bit of pain in her  hands wrists and ankles,, soemtems there is a burning in the bottom of her feet.   She has about 4-5/10 pain in her feet.   She has 4/10 pain in her hands.   The pain is burning in the feet.     2/7/2024  She feels so so - her neck and upper back pain is stilll there.   She feels dizziness as well at times.   When she is walking she feels she gets dizzy. She gets weak down her right shoulder and right leg. - this happens at times.   In December she had bad consitpation- she was having pain - and given dicyclomine - and took one bottle without refills.   She's ok now.   It's weird b/c she never had constipation.   She took medicine and she had her teeth fixed.   She's not exercising.     She is losing weight unitentionally -3-4 months.   She does skip breakfast .   She does get burning on her feet at times - it's not as bad.   Her ingers - her pip joitns are hurting. And her right knee hurts with pain and burning.   She has about 6 /10 at the most in theses joints - like her right knee.     She took a medrol torres -and amoxicillin,  for dental work - for her upper tooth and her lower teeth.   It did help her pain -   She is also taking ibuprofen 800mg a day -     10/14/2024  She still has a litlte bit of joitn pain.   She has 4/10 pain.   I'ts not bad.   She overall is stable     HISTORY:  Past Medical History:    Age-related nuclear cataract of both eyes    Anemia    Arthritis    Back problem    Dry eye    per ng schirmer 10mm/5mm    Early cataract    History of blood transfusion    2 units    Lipid  screening    per NG    Long-term use of Plaquenil    per ng w/ no toxicity    Lupus    per ng sistemic lupus erythematous ; prednison & plaquenil; lupus nephritis nephrotic range proteinuria    Meibomian gland dysfunction    SLE (systemic lupus erythematosus) (HCC)    Prednisone and Plaquenil      Social Hx Reviewed   Family Hx Reviewed     Medications (Active prior to today's visit):  Current Outpatient Medications   Medication Sig Dispense Refill    hydroxychloroquine 200 MG Oral Tab Take 1 tablet (200 mg total) by mouth 2 (two) times daily. 180 tablet 3    Omega-3 Fatty Acids (FISH OIL OR) Take by mouth daily.      Vitamin D3 1000 units Oral Tab Take 1 tablet (1,000 Units total) by mouth 2 (two) times daily.      Calcium Carbonate-Vitamin D 500-200 MG-UNIT Oral Tab Take 1 tablet by mouth daily. Take 2 tabs. Every day      cephalexin 500 MG Oral Cap Take 1 capsule (500 mg total) by mouth every 8 (eight) hours. (Patient not taking: Reported on 10/14/2024) 21 capsule 0       Allergies:  No Known Allergies      ROS:   All other ROS are negative.     PHYSICAL EXAM:   HEENT:  clear sclera  PERRLA, pleasant, no acute distress, no CAD, no neck tendnerness, good ROM,   No rashes - no malar rash  Left ear , mxilalary area and jaw tender - likley tmj  Tapping did not cause any tingling.   CVS: RRR,  RS:  CTAB  ABD: Soft not tender   Joint exam :  Tenderness in lower back  and upper back   Right 3rd pip - oa change stender with mild swelling.   Tender in left 3rd finger with mid swelling  Right 2nd and 5th dip tender with mild heberdone nodes   Early oa changes in hadns   B/ lwrists mild tender   Not tender in bl knees, right knee crepitus felt -   No synovitis seen       Component      Latest Ref Rng 10/28/2023   Glucose      70 - 99 mg/dL 93    Sodium      136 - 145 mmol/L 142    Potassium      3.5 - 5.1 mmol/L 4.0    Chloride      98 - 112 mmol/L 109    Carbon Dioxide, Total      21.0 - 32.0 mmol/L 29.0    ANION GAP       0 - 18 mmol/L 4    BUN      7 - 18 mg/dL 11    CREATININE      0.55 - 1.02 mg/dL 0.73    BUN/CREATININE RATIO      10.0 - 20.0  15.1    CALCIUM      8.5 - 10.1 mg/dL 9.2    CALCULATED OSMOLALITY      275 - 295 mOsm/kg 293    EGFR      >=60 mL/min/1.73m2 96    ALT (SGPT)      13 - 56 U/L 17    AST (SGOT)      15 - 37 U/L 13 (L)    ALKALINE PHOSPHATASE      46 - 118 U/L 96    Total Bilirubin      0.1 - 2.0 mg/dL 1.0    PROTEIN, TOTAL      6.4 - 8.2 g/dL 7.6    Albumin      3.4 - 5.0 g/dL 3.9    Globulin      2.8 - 4.4 g/dL 3.7    A/G Ratio      1.0 - 2.0  1.1    Patient Fasting for CMP? Yes    Color Urine      Yellow  Light-Yellow    Clarity Urine      Clear  Clear    Spec Gravity      1.005 - 1.030  1.012    Glucose Urine      Normal mg/dL Normal    Bilirubin Urine      Negative  Negative    Ketones, UA      Negative mg/dL Negative    Blood Urine      Negative  Trace !    PH Urine      5.0 - 8.0  7.0    Protein Urine      Negative mg/dL 20 !    Urobilinogen Urine      Normal  Normal    Nitrite Urine      Negative  Negative    Leukocyte Esterase       Negative  Negative    WBC Urine      0 - 5 /HPF 1-5    RBC Urine      0 - 2 /HPF 0-2    Bacteria Urine      None Seen /HPF None Seen    SQUAM EPI CELLS UR      None Seen /HPF None Seen    RENAL TUBULAR EPITHELIAL CELLS      None Seen /HPF None Seen    TRANSITIONAL EPI CELLS      None Seen /HPF None Seen    YEAST URINE      None Seen /HPF None Seen    WBC      4.0 - 11.0 x10(3) uL 5.3    RBC      3.80 - 5.30 x10(6)uL 4.69    Hemoglobin      12.0 - 16.0 g/dL 14.0    Hematocrit      35.0 - 48.0 % 44.5    MCV      80.0 - 100.0 fL 94.9    MCH      26.0 - 34.0 pg 29.9    MCHC      31.0 - 37.0 g/dL 31.5    RDW      11.0 - 15.0 % 13.4    RDW-SD      35.1 - 46.3 fL 47.5 (H)    Platelet Count      150.0 - 450.0 10(3)uL 185.0    Cholesterol, Total      <200 mg/dL 151    HDL Cholesterol      40 - 59 mg/dL 92 (H)    Triglycerides      30 - 149 mg/dL 53    LDL Cholesterol Calc       <100 mg/dL 48    VLDL      0 - 30 mg/dL 7    NON-HDL CHOLESTEROL      <130 mg/dL 59    Patient Fasting for Lipid? Yes    MALB URINE      mg/dL 1.36    CREATININE UR RANDOM      mg/dL 105.00    MALB/CRE CALC      <=30.0 ug/mg 13.0    SED RATE      0 - 30 mm/Hr 11    C-REACTIVE PROTEIN      <0.30 mg/dL <0.29    TSH      0.358 - 3.740 mIU/mL 1.520    VITAMIN D, 25-OH, TOTAL      30.0 - 100.0 ng/mL 36.2         Component      Latest Ref Rng 10/12/2024   Glucose      70 - 99 mg/dL 87    Sodium      136 - 145 mmol/L 144    Potassium      3.5 - 5.1 mmol/L 4.1    Chloride      98 - 112 mmol/L 108    Carbon Dioxide, Total      21.0 - 32.0 mmol/L 29.0    ANION GAP      0 - 18 mmol/L 7    BUN      9 - 23 mg/dL 11    CREATININE      0.55 - 1.02 mg/dL 0.63    BUN/CREATININE RATIO      10.0 - 20.0  17.5    CALCIUM      8.7 - 10.4 mg/dL 9.3    CALCULATED OSMOLALITY      275 - 295 mOsm/kg 297 (H)    EGFR      >=60 mL/min/1.73m2 103    ALT (SGPT)      10 - 49 U/L 12    AST (SGOT)      <34 U/L 18    ALKALINE PHOSPHATASE      46 - 118 U/L 92    Total Bilirubin      0.3 - 1.2 mg/dL 1.1    PROTEIN, TOTAL      5.7 - 8.2 g/dL 7.2    Albumin      3.2 - 4.8 g/dL 4.4    Globulin      2.0 - 3.5 g/dL 2.8    A/G Ratio      1.0 - 2.0  1.6    Patient Fasting for CMP? Yes    WBC      4.0 - 11.0 x10(3) uL 3.1 (L)    RBC      3.80 - 5.30 x10(6)uL 4.59    Hemoglobin      12.0 - 16.0 g/dL 14.1    Hematocrit      35.0 - 48.0 % 42.0    MCV      80.0 - 100.0 fL 91.5    MCH      26.0 - 34.0 pg 30.7    MCHC      31.0 - 37.0 g/dL 33.6    RDW      11.0 - 15.0 % 13.5    RDW-SD      35.1 - 46.3 fL 45.6    Platelet Count      150.0 - 450.0 10(3)uL 188.0    SED RATE      0 - 30 mm/Hr 21    C-REACTIVE PROTEIN      <1.00 mg/dL <0.40    PHOSPHORUS      2.4 - 5.1 mg/dL 3.9       Legend:  (H) High  (L) Low      8/22/2016 - right knee xray   Mild/early degenerative changes in the patellofemoral compartment with  trace effusion.     Mild prepatellar soft tissue  swelling.  5/21/2016 - cxr -   1. Tortuous aorta otherwise normal chest unchanged from January 28, 2013.    11/4/2016 - lumbar spine xray   1. Minimal osteoarthritis.  2. Minimal osteitis condensans ilii.  11/4/2016 - pelvis xray - no acute disease     11/12/2020 -   Chest xray   CONCLUSION:   1. Multifocal pneumonitis versus atypical viral pneumonia most pronounced in the left apex..    2. Vhmprw-he-fz to interval resolution.      6/20/2020 - 2decho  Study Conclusions   1. Left ventricle: The cavity size was mildly dilated. Wall      thickness was normal. Systolic function was normal. The      estimated ejection fraction was 65%, by biplane method of disks.      Wall motion was normal; there were no regional wall motion      abnormalities. Left ventricular diastolic function parameters      were normal.   2. Aortic valve: Mild regurgitation.   3. Mitral valve: Mild regurgitation.   4. Left atrium: The atrium was mildly dilated.   5. Impressions: Normal pulmonary artery pressure.   Compared to the prior study there has been no significant interval   Change      11/18/2020 - chesty xray   CONCLUSION:      Interval improvement of previously described left upper lobe opacity with mild residual opacity remaining.  Recommend continued short-term follow-up chest radiographs to document imaging resolution.         1. SLE (systemic lupus erythematosus) (HCC)  1. 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 Smith, this doesn't seem to be from SLE - lupus seems in remission   -  cont. hydroxychlrooquine 200mg bid  -   - she's avoiding celebrexa and ibupofen - she can call for script - she can take as needed for upper back pain - a, she willt ry tyelno.   - ok to take ibuprofen as needed for joitn pain -   - off azathiorpin  50mg a day now -but can add back if joint pain increases   - taking omega 3 - in cresae to 2000mg a day -   - mild porteinuria - has lori bess- has seen dr. paniagua in the past   - headache is 2 days a week - bulbital given - hx of migraines - it helps her -   = echo 6/2020 - normal  - if joint pain - is wrose - can add back azathioprine   In the past:   - flared after  viral infection on 11/12/2020 - -leukopenia and slightly low platelets now with high fever and pneumonitis on chest xray ,  better after  medrol torres -- tyelnol as needed -  And s/pt prednisone 5mg a day. X 1month   2. Eye exam done Normal, 6/2024 -   3. Vaginal bleeding - s/p hysterectomy 2/2013 - hb is stable.   4. righ tknee pain - declines steroid inejction , - cont. Home exercises learned in pt - tylenol arthriits for pain ,voltaren gel 1% -   5. Lower back pain - and now upper back pain - celebrx as needed  -  off cymbalta 60mg ad ya - per dr. Price.   6. Long d/w pt - that she can get the covid vaccine   7. Plantar fasciiits - both feet - exercises given, orthotics recommeneded.  better  8 .hx of  left sided TMJ - now off of  baclofen  Or cyclobenzaprine 5mg at night - f/u with ent or neurology - improved   9. Vit d def - check level   Summary:  1.  Try tyelnol -arthritis  and ibuprofen  for hands and lower back pain and upper back pain   2.. Cont. hydroxychlrqouine 200mg twice a day    3. Check labs every 6 months.   4. Return to clinic in 6 months -   5. Take fish oil and turmeric -  omega 3 to 2000mg a day - to see if this helps the joint pain   6. Try turmeric   7. Vit d is 2000unitsa d ay-       She will call about if she wants PT for right knee pain   If she is still losing weight she will talk to Dr. Smith        She decliens PT at this time for her lower back.     Rafael Ohara MD  10/14/2024   3:23 PM     is applicable because the patient's medical record notes reflects the ongoing nature of the continuous longitudinal  relationship of care, and the medical record indicates that there is ongoing treatment of a serious/complex medical condition.

## 2024-10-14 NOTE — TELEPHONE ENCOUNTER
Rand ESTES informed patient of note below and verbalized understanding. ,Rn generated order for urinalysis and microalbumin

## 2024-10-15 ENCOUNTER — TELEPHONE (OUTPATIENT)
Dept: FAMILY MEDICINE CLINIC | Facility: CLINIC | Age: 59
End: 2024-10-15

## 2024-10-15 LAB — DSDNA IGG SERPL IA-ACNC: 8.6 IU/ML

## 2024-10-15 NOTE — TELEPHONE ENCOUNTER
Patient called requesting to speak with . Patient states she's has a question for the lead and requested a call back.  informed.

## 2024-10-15 NOTE — TELEPHONE ENCOUNTER
Patient called back stating she will be re certifying Family Medical Leave Act, all information will remain the same. Patient will call [facility] to have forms faxed to us. Start of leave: 11/4/24

## 2024-10-22 ENCOUNTER — TELEPHONE (OUTPATIENT)
Dept: FAMILY MEDICINE CLINIC | Facility: CLINIC | Age: 59
End: 2024-10-22

## 2024-10-22 DIAGNOSIS — Z00.00 ANNUAL PHYSICAL EXAM: Primary | ICD-10-CM

## 2024-10-22 NOTE — TELEPHONE ENCOUNTER
Patient called to confirm Family Medical Leave Act forms were received from [employer] - Informed patient forms received and logged for processing. Please call patient when forms are completed           Patient will  forms from provider's officer    Type of Leave: Intermittent Family Medical Leave Act (re-cert) patient has appointment 10/25/24  Reason for Leave: Lupus  Start date of leave: 11/4/24  End date of leave: 11/4/25  How many flare ups per month/length?: 1-4 flare ups a month, lasting 1 day  How many appts per month/length?: 1-2 appointments a month, lasting 1-4 hours  Was Fee and Turnaround info Given?:

## 2024-10-22 NOTE — TELEPHONE ENCOUNTER
Patient called to request labs ordered prior to her 10/25 physical appointment.     Patient is asking if orders can be placed today or tomorrow, if possible, will like to have results for Friday.     Patient is Concerned about Diabetes, cholesterol and whatever else is needed.

## 2024-10-23 NOTE — TELEPHONE ENCOUNTER
Per [facility]NA[employer] detailed message left notifying patient with  [employer] ID# [id number].  Mcyhart message sent.  Encouraged to call with  questions or concerns.

## 2024-10-24 ENCOUNTER — LAB ENCOUNTER (OUTPATIENT)
Dept: LAB | Facility: HOSPITAL | Age: 59
End: 2024-10-24
Attending: FAMILY MEDICINE
Payer: COMMERCIAL

## 2024-10-24 DIAGNOSIS — Z87.39 H/O LUPUS NEPHRITIS: ICD-10-CM

## 2024-10-24 LAB
BILIRUB UR QL: NEGATIVE
CHOLEST SERPL-MCNC: 160 MG/DL (ref ?–200)
CLARITY UR: CLEAR
CREAT UR-SCNC: 52.1 MG/DL
EST. AVERAGE GLUCOSE BLD GHB EST-MCNC: 117 MG/DL (ref 68–126)
FASTING PATIENT LIPID ANSWER: YES
GLUCOSE UR-MCNC: NORMAL MG/DL
HBA1C MFR BLD: 5.7 % (ref ?–5.7)
HDLC SERPL-MCNC: 84 MG/DL (ref 40–59)
KETONES UR-MCNC: NEGATIVE MG/DL
LDLC SERPL CALC-MCNC: 66 MG/DL (ref ?–100)
LEUKOCYTE ESTERASE UR QL STRIP.AUTO: NEGATIVE
MICROALBUMIN UR-MCNC: <0.3 MG/DL
NITRITE UR QL STRIP.AUTO: NEGATIVE
NONHDLC SERPL-MCNC: 76 MG/DL (ref ?–130)
PH UR: 5.5 [PH] (ref 5–8)
PROT UR-MCNC: NEGATIVE MG/DL
SP GR UR STRIP: 1.01 (ref 1–1.03)
TRIGL SERPL-MCNC: 44 MG/DL (ref 30–149)
TSI SER-ACNC: 1.63 MIU/ML (ref 0.55–4.78)
UROBILINOGEN UR STRIP-ACNC: NORMAL
VLDLC SERPL CALC-MCNC: 7 MG/DL (ref 0–30)

## 2024-10-24 PROCEDURE — 36415 COLL VENOUS BLD VENIPUNCTURE: CPT | Performed by: FAMILY MEDICINE

## 2024-10-24 PROCEDURE — 82043 UR ALBUMIN QUANTITATIVE: CPT

## 2024-10-24 PROCEDURE — 82570 ASSAY OF URINE CREATININE: CPT

## 2024-10-24 PROCEDURE — 84443 ASSAY THYROID STIM HORMONE: CPT | Performed by: FAMILY MEDICINE

## 2024-10-24 PROCEDURE — 81001 URINALYSIS AUTO W/SCOPE: CPT

## 2024-10-24 PROCEDURE — 83036 HEMOGLOBIN GLYCOSYLATED A1C: CPT | Performed by: FAMILY MEDICINE

## 2024-10-24 PROCEDURE — 80061 LIPID PANEL: CPT | Performed by: FAMILY MEDICINE

## 2024-10-25 ENCOUNTER — OFFICE VISIT (OUTPATIENT)
Dept: FAMILY MEDICINE CLINIC | Facility: CLINIC | Age: 59
End: 2024-10-25

## 2024-10-25 VITALS
TEMPERATURE: 98 F | OXYGEN SATURATION: 97 % | HEIGHT: 63 IN | HEART RATE: 66 BPM | BODY MASS INDEX: 24.8 KG/M2 | WEIGHT: 140 LBS | DIASTOLIC BLOOD PRESSURE: 70 MMHG | RESPIRATION RATE: 20 BRPM | SYSTOLIC BLOOD PRESSURE: 118 MMHG

## 2024-10-25 DIAGNOSIS — M32.9 SLE (SYSTEMIC LUPUS ERYTHEMATOSUS RELATED SYNDROME) (HCC): ICD-10-CM

## 2024-10-25 DIAGNOSIS — Z00.00 ANNUAL PHYSICAL EXAM: Primary | ICD-10-CM

## 2024-10-25 PROCEDURE — 99396 PREV VISIT EST AGE 40-64: CPT | Performed by: FAMILY MEDICINE

## 2024-10-25 RX ORDER — AMOXICILLIN 875 MG/1
875 TABLET, COATED ORAL 2 TIMES DAILY
Qty: 14 TABLET | Refills: 0 | Status: SHIPPED | OUTPATIENT
Start: 2024-10-25 | End: 2024-11-01

## 2024-10-25 NOTE — PROGRESS NOTES
Subjective:   Patient ID: Mary Carmen Viera is a 58 year old female.    HPI  Here for annual physical   Has SLE doing well with hydroxychloroquine    History/Other:   Review of Systems    Constitutional: Negative.  Negative for activity change, appetite change, diaphoresis and fatigue.     Respiratory: Negative.  Negative for apnea, cough, chest tightness and shortness of breath.    Cardiovascular: Negative.  Negative for chest pain, palpitations and leg swelling.   Gastrointestinal: Negative.  Negative for abdominal pain.   Skin: Negative.           Psychiatric/Behavioral: Negative.        Current Outpatient Medications   Medication Sig Dispense Refill    hydroxychloroquine 200 MG Oral Tab Take 1 tablet (200 mg total) by mouth 2 (two) times daily. 180 tablet 3    cephalexin 500 MG Oral Cap Take 1 capsule (500 mg total) by mouth every 8 (eight) hours. 21 capsule 0    Omega-3 Fatty Acids (FISH OIL OR) Take by mouth daily.      Vitamin D3 1000 units Oral Tab Take 1 tablet (1,000 Units total) by mouth 2 (two) times daily.      Calcium Carbonate-Vitamin D 500-200 MG-UNIT Oral Tab Take 1 tablet by mouth daily. Take 2 tabs. Every day       Allergies:Allergies[1]    Objective:   Physical Exam  Constitutional:       Appearance: She is well-developed.   Cardiovascular:      Rate and Rhythm: Normal rate and regular rhythm.      Heart sounds: Normal heart sounds.   Pulmonary:      Effort: Pulmonary effort is normal.      Breath sounds: Normal breath sounds.   Abdominal:      General: Bowel sounds are normal.      Palpations: Abdomen is soft.   Neurological:      Mental Status: She is alert.      Deep Tendon Reflexes: Reflexes are normal and symmetric.         Assessment & Plan:     ICD-10-CM    1. Annual physical exam  Z00.00       2. SLE (systemic lupus erythematosus related syndrome) ([facility])  M32.9       Stable cpm      No orders of the defined types were placed in this encounter.      Meds This Visit:  Requested Prescriptions       No prescriptions requested or ordered in this encounter       Imaging & Referrals:  None         [1] No Known Allergies

## 2024-10-28 NOTE — TELEPHONE ENCOUNTER
Pt stopped by to renew signature on HIPAA form. Form scanned and mailed to corporate dept/forms dept.

## 2024-10-28 NOTE — TELEPHONE ENCOUNTER
Patient calling requesting to speak to someone who speaks Estonian. Spoke to patient with language line  ID [id number]. Patient wanted to check the status of her Family Medical Leave Act forms. Let her know her forms are being processed and informed her of our current turnaround time. Patient now asking that forms be faxed to the [employer] when completed. Let her know we do not have authorization on file. Authorization on file was from [employer]. Sent patient [employer] message for Authorization.

## 2024-10-28 NOTE — TELEPHONE ENCOUNTER
Dr. Smith,    **The ACKNOWLEDGE button has been moved to the top right ribbon**    Please sign off on form if you agree to: Family Medical Leave Act recertification  (place your signature on the first page only)  -From your Inbasket, Highlight the patient and click Chart  -Double click the 10/15/24 Forms Completion telephone encounter  -Scroll down to the Media section  -Click the blue Hyperlink: FMLa re--cert Dr. Smith 10/28/24    -Click Acknowledge located in the top right ribbon/menu  -Drag the mouse into the blank space of the document and a + sign will appear. Left click to  electronically sign the document.    Thank you,    Meme

## 2024-10-28 NOTE — TELEPHONE ENCOUNTER
Duplicate FMLA forms rcvd in forms dept from [facility] for Dr Smith. Forms placed in archive.

## 2024-10-29 ENCOUNTER — TELEPHONE (OUTPATIENT)
Dept: NEPHROLOGY | Facility: CLINIC | Age: 59
End: 2024-10-29

## 2024-10-29 NOTE — TELEPHONE ENCOUNTER
----- Message from Earl English sent at 10/28/2024  6:31 PM CDT -----  Please call patient regarding test results.  Not reading [employer]

## 2024-10-29 NOTE — TELEPHONE ENCOUNTER
Notified patient of Dr. English  test result message.   11/13/2024  Scheduled appointment as recommended.   Patient voiced understanding.

## 2024-11-05 NOTE — TELEPHONE ENCOUNTER
Form completed and E-faxed to [employer], [phone] on 11/04/24 with confirmation received.  MyChart sent to inform patient.

## 2024-11-13 ENCOUNTER — OFFICE VISIT (OUTPATIENT)
Dept: NEPHROLOGY | Facility: CLINIC | Age: 59
End: 2024-11-13

## 2024-11-13 VITALS
SYSTOLIC BLOOD PRESSURE: 97 MMHG | BODY MASS INDEX: 25 KG/M2 | HEART RATE: 72 BPM | DIASTOLIC BLOOD PRESSURE: 65 MMHG | WEIGHT: 140 LBS

## 2024-11-13 DIAGNOSIS — Z87.39 H/O LUPUS NEPHRITIS: Primary | ICD-10-CM

## 2024-11-13 PROCEDURE — 99214 OFFICE O/P EST MOD 30 MIN: CPT | Performed by: INTERNAL MEDICINE

## 2024-11-13 NOTE — PROGRESS NOTES
11/13/24        Patient: Mary Carmen Viera   YOB: 1965   Date of Visit: 11/13/2024       Dear  Dr. Sarah MD,      Thank you for referring Mary Carmen Viera to my practice.  Please find my assessment and plan below.      As you know she is a 58-year-old female with a history of systemic lupus erythematosus.  Lupus nephritis diagnosed in 2008, class IV and class V who is now here for follow-up.  Last seen in January 2022.  Overall states she has been doing well without any chest pain, shortness of breath, GI or urinary tract symptoms.  No symptoms to suggest active lupus.  I last saw her in January 2022.  Sees rheumatology every 6 months.    Physical exam initial blood pressure 97/65.  Repeat is 110/70 with a pulse of 72 and she weighed under 40 pounds.  Her neck was supple without JVD.  Lungs were clear.  Heart revealed a regular rate and rhythm without gallops or murmurs.  Abdomen was soft, flat, nontender without organomegaly, masses or bruits.  Extremities revealed no edema.  No significant skin rashes or synovitis.    I reviewed her most recent labs done in October 2024.  Creatinine remains normal at 0.63.  Urinalysis showed 1-5 WBCs 0-2 RBCs and no protein.  Recent sed rate, C-reactive protein and anti-double-stranded DNA were all negative.    I therefore reassured the patient that there is no evidence for active SLE and kidney function remains normal.  She will continue to see rheumatology every 6 months.  Recommended she see me in 1 year for follow-up or sooner if clinically indicated.    Thank you again for allowing me to participate in the care of your patient.  If you have any questions please feel free to call.           Sincerely,   Earl English MD   [facility], [facility], [place]  [address]  [place] [address]    Document electronically generated by:  Earl English MD

## 2025-01-08 ENCOUNTER — OFFICE VISIT (OUTPATIENT)
Dept: FAMILY MEDICINE CLINIC | Facility: CLINIC | Age: 60
End: 2025-01-08

## 2025-01-08 VITALS
HEIGHT: 63 IN | TEMPERATURE: 99 F | HEART RATE: 76 BPM | BODY MASS INDEX: 25.34 KG/M2 | DIASTOLIC BLOOD PRESSURE: 79 MMHG | SYSTOLIC BLOOD PRESSURE: 130 MMHG | WEIGHT: 143 LBS

## 2025-01-08 DIAGNOSIS — R05.1 ACUTE COUGH: ICD-10-CM

## 2025-01-08 DIAGNOSIS — J06.9 UPPER RESPIRATORY TRACT INFECTION, UNSPECIFIED TYPE: Primary | ICD-10-CM

## 2025-01-08 DIAGNOSIS — J02.9 SORE THROAT: ICD-10-CM

## 2025-01-08 LAB
CONTROL LINE PRESENT WITH A CLEAR BACKGROUND (YES/NO): YES YES/NO
KIT LOT #: NORMAL NUMERIC
STREP GRP A CUL-SCR: NEGATIVE

## 2025-01-08 PROCEDURE — 99213 OFFICE O/P EST LOW 20 MIN: CPT | Performed by: FAMILY MEDICINE

## 2025-01-08 PROCEDURE — 87880 STREP A ASSAY W/OPTIC: CPT | Performed by: FAMILY MEDICINE

## 2025-01-08 RX ORDER — BENZONATATE 100 MG/1
CAPSULE ORAL
Qty: 40 CAPSULE | Refills: 0 | Status: SHIPPED | OUTPATIENT
Start: 2025-01-08

## 2025-01-08 NOTE — PROGRESS NOTES
HPI:   Mary Carmen Viera is a 59 year old female who presents for:    Patient presents with:  Irritated and sore Throat, cough, congestion, fatigue and Headache x3 days, she does have a little shortness of breath, no wheezing, she has had fevers, no known sick contacts, patient lives by herself, she did a COVID test this morning at home which was negative  She complains of some mild upper abdominal pain that radiates up to chest but denies chest pain otherwise, there is some nausea, no diarrhea, no vomiting          See ROS for pertinent positive and negative complaints.    Allergies[1]    Current Outpatient Medications   Medication Sig Dispense Refill    benzonatate 100 MG Oral Cap 1-2 caps po tid prn for cough 40 capsule 0    hydroxychloroquine 200 MG Oral Tab Take 1 tablet (200 mg total) by mouth 2 (two) times daily. 180 tablet 3    Omega-3 Fatty Acids (FISH OIL OR) Take by mouth daily.      Vitamin D3 1000 units Oral Tab Take 1 tablet (1,000 Units total) by mouth 2 (two) times daily.      Calcium Carbonate-Vitamin D 500-200 MG-UNIT Oral Tab Take 1 tablet by mouth daily. Take 2 tabs. Every day       Past Medical History:    Age-related nuclear cataract of both eyes    Anemia    Arthritis    Back problem    Dry eye    per ng schirmer 10mm/5mm    Early cataract    History of blood transfusion    2 units    Lipid screening    per NG    Long-term use of Plaquenil    per ng w/ no toxicity    Lupus    per ng sistemic lupus erythematous ; prednison & plaquenil; lupus nephritis nephrotic range proteinuria    Meibomian gland dysfunction    SLE (systemic lupus erythematosus) (HCC)    Prednisone and Plaquenil     Past Surgical History:   Procedure Laterality Date    Blood transfusions  2013    per ng 2 unit    Cataract  2013    per ng early    Colonoscopy N/A 11/16/2018    Procedure: COLONOSCOPY;  Surgeon: Buddy Regan MD;  Location: [facility] ENDOSCOPY    Hysterectomy  2013    [place] teeth removed  02/2021       REVIEW OF  SYSTEMS:   Review of Systems   Constitutional:  Positive for fatigue and fever. Negative for appetite change.   HENT:  Positive for congestion, ear pain, rhinorrhea and sore throat. Negative for ear discharge, facial swelling, sinus pressure and sinus pain.    Eyes:  Negative for discharge and redness.   Respiratory:  Positive for cough. Negative for shortness of breath and wheezing.    Cardiovascular: Negative.    Genitourinary: Negative.    Musculoskeletal:  Negative for myalgias.   Skin:  Negative for rash.   Neurological:  Positive for headaches. Negative for weakness.   Psychiatric/Behavioral: Negative.         PHYSICAL EXAM:   /79 (BP Location: Left arm, Patient Position: Sitting)   Pulse 76   Temp 99.1 °F (37.3 °C)   Ht 5' 3\" (1.6 m)   Wt 143 lb (64.9 kg)   BMI 25.33 kg/m²  Estimated body mass index is 25.33 kg/m² as calculated from the following:    Height as of this encounter: 5' 3\" (1.6 m).    Weight as of this encounter: 143 lb (64.9 kg).  Physical Exam  Vitals and nursing note reviewed.   Constitutional:       General: She is not in acute distress.     Appearance: Normal appearance. She is normal weight. She is not ill-appearing, toxic-appearing or diaphoretic.   HENT:      Head: Normocephalic.      Right Ear: Tympanic membrane, ear canal and external ear normal. There is no impacted cerumen.      Left Ear: Tympanic membrane, ear canal and external ear normal. There is no impacted cerumen.      Nose: Congestion present. No rhinorrhea.      Mouth/Throat:      Mouth: Mucous membranes are moist.      Pharynx: Posterior oropharyngeal erythema (Mild) present. No oropharyngeal exudate.   Eyes:      General:         Right eye: No discharge.         Left eye: No discharge.      Extraocular Movements: Extraocular movements intact.      Conjunctiva/sclera: Conjunctivae normal.   Cardiovascular:      Rate and Rhythm: Normal rate and regular rhythm.      Heart sounds: Normal heart sounds. No murmur  heard.  Pulmonary:      Effort: Pulmonary effort is normal. No respiratory distress.      Breath sounds: Normal breath sounds. No stridor. No wheezing, rhonchi or rales.   Abdominal:      General: Bowel sounds are normal.   Musculoskeletal:         General: No swelling.      Cervical back: Normal range of motion and neck supple. No rigidity or tenderness.   Lymphadenopathy:      Cervical: No cervical adenopathy.   Skin:     Capillary Refill: Capillary refill takes less than 2 seconds.      Findings: No rash.   Neurological:      General: No focal deficit present.      Mental Status: She is alert and oriented to person, place, and time.   Psychiatric:         Mood and Affect: Mood normal.         Behavior: Behavior normal.        used for entire visit  ASSESSMENT AND PLAN:   1. Patient is a 59 year old female who presents for:     1. Upper respiratory tract infection, unspecified type    2. Sore throat    - POC Rapid Strep [22579]    3. Acute cough    No acute or respiratory distress  POC tests:  Rapid strep negative  Other testing deferred    Reviewed viral versus bacterial infections and when antibiotics are appropriate.  Discussed normal progression of and duration of viral illness and when to seek reevaluation.    Plan:  Prescriptions given today: Benzonatate as needed for cough  Will observe for now  Follow-up in the next few days as needed, or sooner if no improvement or symptoms are worsening.  See additional recommendations and follow-up below    Patient (and/or guardian or parent if less than 18 years old) was given instructions regarding diagnosis, management, expectations and follow up.    Patient Instructions   Recommend:    Medication(s), over-the-counter Robitussin or Mucinex DM (dextromethorphan, for cough and cold) or benzonatate as prescribed today as needed for cough    Follow-up in the next 3-4 days as needed, or sooner if no improvement or symptoms are worsening.      Go to the emergency  room or call 911 for any new or suddenly worsening symptoms, any signs of acute distress, respiratory stress or emergent changes.    Care for upper respiratory infection (nasal and sinus congestion, cough, and sore throat):  Encourage rest, keeping well hydrated, and symptomatic treatment.  You may try warm water or tea with honey and lemon.  For a sore throat salt water gargles 3 times a day may be helpful.  For congestion and cold symptoms, a humidifier may also be helpful.  For nasal and sinus congestion try saline nasal rinses (like [place] Hammer Simply Saline) 3-4 times a day.  Acetaminophen (Tylenol) and/or NSAID like Ibuprofen (take with food, stop if side effects or heartburn occurs) as needed and as tolerated. Take at least 3 hours apart if needed. (Do not take if contraindicated or you have been told otherwise.)            (See AVS)    All questions were answered.  We discussed the indications, proper use, risks, and benefits of the above recommendations including any medication(s) as prescribed.  The patient's parent (or guardian) indicate(s) understanding and agree(s) to the above plan of care.    Orders Placed This Encounter   Procedures    POC Rapid Strep [54743]     Meds & Refills for this Visit:  Requested Prescriptions     Signed Prescriptions Disp Refills    benzonatate 100 MG Oral Cap 40 capsule 0     Si-2 caps po tid prn for cough     Other Orders, Imaging & Consults:  None    Shruthi Tinajero MD        [1] No Known Allergies

## 2025-01-08 NOTE — PATIENT INSTRUCTIONS
Recommend:    Medication(s), over-the-counter Robitussin or Mucinex DM (dextromethorphan, for cough and cold) or benzonatate as prescribed today as needed for cough    Follow-up in the next 3-4 days as needed, or sooner if no improvement or symptoms are worsening.      Go to the emergency room or call 911 for any new or suddenly worsening symptoms, any signs of acute distress, respiratory stress or emergent changes.    Care for upper respiratory infection (nasal and sinus congestion, cough, and sore throat):  Encourage rest, keeping well hydrated, and symptomatic treatment.  You may try warm water or tea with honey and lemon.  For a sore throat salt water gargles 3 times a day may be helpful.  For congestion and cold symptoms, a humidifier may also be helpful.  For nasal and sinus congestion try saline nasal rinses (like [place] Hammer Simply Saline) 3-4 times a day.  Acetaminophen (Tylenol) and/or NSAID like Ibuprofen (take with food, stop if side effects or heartburn occurs) as needed and as tolerated. Take at least 3 hours apart if needed. (Do not take if contraindicated or you have been told otherwise.)

## 2025-01-14 ENCOUNTER — TELEPHONE (OUTPATIENT)
Dept: FAMILY MEDICINE CLINIC | Facility: CLINIC | Age: 60
End: 2025-01-14

## 2025-01-14 ENCOUNTER — OFFICE VISIT (OUTPATIENT)
Dept: FAMILY MEDICINE CLINIC | Facility: CLINIC | Age: 60
End: 2025-01-14

## 2025-01-14 VITALS
BODY MASS INDEX: 25.34 KG/M2 | OXYGEN SATURATION: 97 % | SYSTOLIC BLOOD PRESSURE: 125 MMHG | WEIGHT: 143 LBS | DIASTOLIC BLOOD PRESSURE: 77 MMHG | RESPIRATION RATE: 20 BRPM | HEART RATE: 58 BPM | TEMPERATURE: 96 F | HEIGHT: 63 IN

## 2025-01-14 DIAGNOSIS — M32.9 SLE (SYSTEMIC LUPUS ERYTHEMATOSUS RELATED SYNDROME) (HCC): ICD-10-CM

## 2025-01-14 PROCEDURE — 99213 OFFICE O/P EST LOW 20 MIN: CPT | Performed by: FAMILY MEDICINE

## 2025-01-14 RX ORDER — AZITHROMYCIN 250 MG/1
TABLET, FILM COATED ORAL
Qty: 6 TABLET | Refills: 0 | Status: SHIPPED | OUTPATIENT
Start: 2025-01-14 | End: 2025-01-19

## 2025-01-14 NOTE — PROGRESS NOTES
Subjective:   Patient ID: Mary Carmen Viera is a 59 year old female.    HPI  Patient having cough for almost a week and it is not getting better   Denies fever but feeling more weak   Cough comes in attacks that last for days   History/Other:   Review of Systems    Constitutional: Negative.  Negative for activity change, appetite change, diaphoresis and fatigue.     Respiratory: see hpi   Cardiovascular: Negative.  Negative for chest pain, palpitations and leg swelling.   Gastrointestinal: Negative.  Negative for abdominal pain.   Skin: Negative.             Current Outpatient Medications   Medication Sig Dispense Refill    azithromycin (ZITHROMAX Z-DARIELA) 250 MG Oral Tab Take 2 tablets (500 mg total) by mouth daily for 1 day, THEN 1 tablet (250 mg total) daily for 4 days. 6 tablet 0    benzonatate 100 MG Oral Cap 1-2 caps po tid prn for cough 40 capsule 0    hydroxychloroquine 200 MG Oral Tab Take 1 tablet (200 mg total) by mouth 2 (two) times daily. 180 tablet 3    Omega-3 Fatty Acids (FISH OIL OR) Take by mouth daily.      Vitamin D3 1000 units Oral Tab Take 1 tablet (1,000 Units total) by mouth 2 (two) times daily.      Calcium Carbonate-Vitamin D 500-200 MG-UNIT Oral Tab Take 1 tablet by mouth daily. Take 2 tabs. Every day       Allergies:Allergies[1]    Objective:   Physical Exam  Constitutional:       Appearance: Normal appearance.   Cardiovascular:      Rate and Rhythm: Normal rate and regular rhythm.      Pulses: Normal pulses.      Heart sounds: Normal heart sounds.   Pulmonary:      Comments: Prolonged exp phase  Neurological:      Mental Status: She is alert.         Assessment & Plan:   1. SLE (systemic lupus erythematosus related syndrome) ([facility])    2. Bronchitis   Z pack   To call me if not better     No orders of the defined types were placed in this encounter.      Meds This Visit:  Requested Prescriptions     Signed Prescriptions Disp Refills    azithromycin (ZITHROMAX Z-DARIELA) 250 MG Oral Tab 6 tablet 0      Sig: Take 2 tablets (500 mg total) by mouth daily for 1 day, THEN 1 tablet (250 mg total) daily for 4 days.       Imaging & Referrals:  None         [1] No Known Allergies

## 2025-01-15 ENCOUNTER — HOSPITAL ENCOUNTER (EMERGENCY)
Facility: HOSPITAL | Age: 60
Discharge: HOME | End: 2025-01-15
Attending: EMERGENCY MEDICINE
Payer: COMMERCIAL

## 2025-01-15 VITALS
DIASTOLIC BLOOD PRESSURE: 98 MMHG | HEART RATE: 64 BPM | SYSTOLIC BLOOD PRESSURE: 119 MMHG | HEIGHT: 63 IN | BODY MASS INDEX: 25.34 KG/M2 | WEIGHT: 143 LBS | TEMPERATURE: 99 F | OXYGEN SATURATION: 96 % | RESPIRATION RATE: 20 BRPM

## 2025-01-15 DIAGNOSIS — H65.91 RIGHT NON-SUPPURATIVE OTITIS MEDIA: Primary | ICD-10-CM

## 2025-01-15 DIAGNOSIS — H60.501 ACUTE OTITIS EXTERNA OF RIGHT EAR, UNSPECIFIED TYPE: ICD-10-CM

## 2025-01-15 PROCEDURE — 99283 EMERGENCY DEPT VISIT LOW MDM: CPT

## 2025-01-15 RX ORDER — IBUPROFEN 600 MG/1
600 TABLET, FILM COATED ORAL ONCE
Status: COMPLETED | OUTPATIENT
Start: 2025-01-15 | End: 2025-01-15

## 2025-01-15 RX ORDER — NEOMYCIN SULFATE, POLYMYXIN B SULFATE AND HYDROCORTISONE 10; 3.5; 1 MG/ML; MG/ML; [USP'U]/ML
4 SUSPENSION/ DROPS AURICULAR (OTIC) 4 TIMES DAILY
Qty: 10 ML | Refills: 0 | Status: SHIPPED | OUTPATIENT
Start: 2025-01-15 | End: 2025-01-15

## 2025-01-15 RX ORDER — NEOMYCIN SULFATE, POLYMYXIN B SULFATE AND HYDROCORTISONE 10; 3.5; 1 MG/ML; MG/ML; [USP'U]/ML
4 SUSPENSION/ DROPS AURICULAR (OTIC) 4 TIMES DAILY
Qty: 10 ML | Refills: 0 | Status: SHIPPED | OUTPATIENT
Start: 2025-01-15

## 2025-01-15 RX ORDER — IBUPROFEN 600 MG/1
600 TABLET, FILM COATED ORAL EVERY 8 HOURS PRN
Qty: 20 TABLET | Refills: 0 | Status: SHIPPED | OUTPATIENT
Start: 2025-01-15 | End: 2025-01-15

## 2025-01-15 RX ORDER — IBUPROFEN 600 MG/1
600 TABLET, FILM COATED ORAL EVERY 8 HOURS PRN
Qty: 20 TABLET | Refills: 0 | Status: SHIPPED | OUTPATIENT
Start: 2025-01-15 | End: 2025-01-22

## 2025-01-15 NOTE — TELEPHONE ENCOUNTER
You are receiving this message because you were on call 25, please advise what advice was given to the patient. Please sign the encounter after you document if there is no further action needed.       Message # [id number]         2025 05:18p   [CRISTALB]  To:  BLAYNE CHURCHILL  From:  EMY Hernadez MD:  Phone#:  [phone]  ----------------------------------------------------------------------  RE JESSE ERWIN 25, CRITICAL RESULTS          (Message Delivered)   D E L I V E R I E S :  2025 05:20p           JONO      Delivered  2025 05:20p           TIOB      Connected Caller

## 2025-01-16 NOTE — ED PROVIDER NOTES
Patient Seen in: [facility] Emergency Department    History     Chief Complaint   Patient presents with    Ear Problem Pain       HPI    59-year-old female who presents ER today with left ear pain.  Patient states started about 1 to 2 days ago.  Saw her primary care provider who started her on a Z-Eliazra.  Patient the pain is gotten worse.  Also having some difficulty hearing on the same ear.  No injury or trauma to the ear.  No drainage.  No trouble swallowing or breathing.    History reviewed.   Past Medical History:    Age-related nuclear cataract of both eyes    Anemia    Arthritis    Back problem    Dry eye    per ng schirmer 10mm/5mm    Early cataract    History of blood transfusion    2 units    Lipid screening    per NG    Long-term use of Plaquenil    per ng w/ no toxicity    Lupus    per ng sistemic lupus erythematous ; prednison & plaquenil; lupus nephritis nephrotic range proteinuria    Meibomian gland dysfunction    SLE (systemic lupus erythematosus) (HCC)    Prednisone and Plaquenil       History reviewed.   Past Surgical History:   Procedure Laterality Date    Blood transfusions  2013    per ng 2 unit    Cataract  2013    per ng early    Colonoscopy N/A 11/16/2018    Procedure: COLONOSCOPY;  Surgeon: Buddy Regan MD;  Location: [facility] ENDOSCOPY    Hysterectomy  2013    [place] teeth removed  02/2021         Medications :  Prescriptions Prior to Admission[1]     Family History   Problem Relation Age of Onset    Other (Other) Father         per ng prostatitis    No Known Problems Mother     Diabetes Sister     Diabetes Brother     No Known Problems Son     No Known Problems Maternal Grandmother     No Known Problems Maternal Grandfather     No Known Problems Paternal Grandmother     No Known Problems Paternal Grandfather     No Known Problems Self     No Known Problems Daughter     No Known Problems Maternal Aunt     No Known Problems Paternal Aunt     No Known Problems Maternal Cousin Female      No Known Problems Maternal Cousin Male     No Known Problems Paternal Cousin Female     No Known Problems Paternal Cousin Male     No Known Problems Other     Glaucoma Neg     Macular degeneration Neg     Breast Cancer Neg     Ovarian Cancer Neg     DCIS Neg     LCIS Neg     BRCA gene + Neg     [employer] Descent Neg        Smoking Status:   Social History     Socioeconomic History    Marital status:    Tobacco Use    Smoking status: Never    Smokeless tobacco: Never   Vaping Use    Vaping status: Never Used   Substance and Sexual Activity    Alcohol use: Yes     Alcohol/week: 2.0 standard drinks of alcohol     Types: 2 Cans of beer per week     Comment: occasionally    Drug use: No    Sexual activity: Never     Birth control/protection: Hysterectomy   Other Topics Concern    Caffeine Concern Yes     Comment: 3cups coffee, soda, tea    Pt has a pacemaker No    Pt has a defibrillator No    Reaction to local anesthetic No       Constitutional and vital signs reviewed.      Social History and Family History elements reviewed from today, pertinent positives to the presenting problem noted.    Physical Exam     ED Triage Vitals [01/15/25 2142]   BP (!) 183/104   Pulse 88   Resp 20   Temp 98.9 °F (37.2 °C)   Temp src Temporal   SpO2 98 %   O2 Device None (Room air)       All measures to prevent infection transmission during my interaction with the patient were taken. Handwashing was performed prior to and after the exam.  Stethoscope and any equipment used during my examination was cleaned with super sani-cloth germicidal wipes following the exam.     Physical Exam  Vitals and nursing note reviewed.   HENT:      Ears:      Comments: Left ear canal mild erythema with dullness and bulging to the left TM.     Nose: Congestion present.      Mouth/Throat:      Mouth: Mucous membranes are dry.   Cardiovascular:      Rate and Rhythm: Normal rate.      Pulses: Normal pulses.   Pulmonary:      Effort: Pulmonary  effort is normal.   Abdominal:      Palpations: Abdomen is soft.   Neurological:      Mental Status: She is alert.         ED Course      Labs Reviewed - No data to display    As Interpreted by me    Imaging Results Available and Reviewed while in ED: No results found.  ED Medications Administered:   Medications   amoxicillin clavulanate (Augmentin) 875-125 MG per tab 875 mg (875 mg Oral Given 1/15/25 2247)   ibuprofen (Motrin) tab 600 mg (600 mg Oral Given 1/15/25 2247)         MDM     Vitals:    01/15/25 2142   BP: (!) 183/104   Pulse: 88   Resp: 20   Temp: 98.9 °F (37.2 °C)   TempSrc: Temporal   SpO2: 98%   Weight: 64.9 kg   Height: 160 cm (5' 3\")     *I personally reviewed and interpreted all ED vitals.    Pulse Ox: 98%, Room air, Normal       Differential Diagnosis/ Diagnostic Considerations: Otitis media, otitis externa, cerumen impaction    Complicating Factors: The patient already has does not have any pertinent problems on file. to contribute to the complexity of this ED evaluation.    Medical Decision Making  Amount and/or Complexity of Data Reviewed  Independent Historian:      Details: Family at the bedside translating  External Data Reviewed: notes.     Details: Reviewed outpatient notes for evaluation and prescription she was given yesterday    Risk  OTC drugs.  Prescription drug management.      I explained to the patient and family at the bedside who is translating that she has a little bit of an otitis media and externa.  Will treat with oral and topical antibiotics.  Along with ibuprofen as needed for pain.  I explained to them to take all medications as prescribed.  Will also refer her to ENT, call to schedule appointment.  Return to the ER if some continue, get worse, unable to follow-up  Condition upon leaving the department: Stable    Disposition and Plan     Clinical Impression:  1. Right non-suppurative otitis media    2. Acute otitis externa of right ear, unspecified type         Disposition:  Discharge    Follow-up:  Beth Smith MD  [address] [address]  [phone]    Follow up      Jose Zacarias DO  [address]  [address]  [place] [address]  [phone]    Follow up        Medications Prescribed:  Current Discharge Medication List        START taking these medications    Details   amoxicillin clavulanate 875-125 MG Oral Tab Take 1 tablet by mouth 2 (two) times daily for 10 days.  Qty: 20 tablet, Refills: 0      ibuprofen 600 MG Oral Tab Take 1 tablet (600 mg total) by mouth every 8 (eight) hours as needed for Pain or Fever.  Qty: 20 tablet, Refills: 0      neomycin-polymyxin-hydrocortisone 3.5-69033-0 Otic Suspension Place 4 drops into the left ear 4 (four) times daily.  Qty: 10 mL, Refills: 0                              [1] (Not in a hospital admission)

## 2025-01-16 NOTE — ED INITIAL ASSESSMENT (HPI)
Seen yesterday by PCP and dx with ear infection. Started on zpak. Pt states ear pain worse today and causing headache.

## 2025-01-16 NOTE — TELEPHONE ENCOUNTER
Noted    Also called [place] lab to verify below message.   Spoke with Kristie, Date of Birth verified.  She stated no critical lab done on this patient .  No further action needed.

## 2025-01-16 NOTE — DISCHARGE INSTRUCTIONS
Take all medications as prescribed.  Follow-up with your primary care provider in 1 to 2 days.  Given ENT referral, call to schedule appointment.  Return to the ER if some continue, get worse, unable to follow-up    Napa todos los medicamentos según lo recetado.  Jesús un seguimiento con whittington proveedor de atención primaria en 1 a 2 días.  Rajesh la derivación de un otorrinolaringólogo, llame para programar marla devan.  Regrese a la lottie de emergencias si algunos continúan, empeoran, no pueden hacer seguimiento

## 2025-02-14 ENCOUNTER — OFFICE VISIT (OUTPATIENT)
Dept: OTOLARYNGOLOGY | Facility: CLINIC | Age: 60
End: 2025-02-14

## 2025-02-14 ENCOUNTER — OFFICE VISIT (OUTPATIENT)
Dept: AUDIOLOGY | Facility: CLINIC | Age: 60
End: 2025-02-14

## 2025-02-14 DIAGNOSIS — H92.03 OTALGIA OF BOTH EARS: ICD-10-CM

## 2025-02-14 DIAGNOSIS — H93.13 BILATERAL TINNITUS: Primary | ICD-10-CM

## 2025-02-14 DIAGNOSIS — H93.19 TINNITUS, UNSPECIFIED LATERALITY: Primary | ICD-10-CM

## 2025-02-14 DIAGNOSIS — H90.6 MIXED HEARING LOSS, BILATERAL: ICD-10-CM

## 2025-02-14 PROCEDURE — 92567 TYMPANOMETRY: CPT | Performed by: AUDIOLOGIST

## 2025-02-14 PROCEDURE — 92557 COMPREHENSIVE HEARING TEST: CPT | Performed by: AUDIOLOGIST

## 2025-02-14 PROCEDURE — 99213 OFFICE O/P EST LOW 20 MIN: CPT | Performed by: OTOLARYNGOLOGY

## 2025-02-14 RX ORDER — CYCLOBENZAPRINE HCL 5 MG
5 TABLET ORAL NIGHTLY
Qty: 30 TABLET | Refills: 1 | Status: SHIPPED | OUTPATIENT
Start: 2025-02-14

## 2025-02-14 RX ORDER — CELECOXIB 200 MG/1
200 CAPSULE ORAL DAILY PRN
Qty: 30 CAPSULE | Refills: 0 | Status: SHIPPED | OUTPATIENT
Start: 2025-02-14 | End: 2025-03-16

## 2025-02-14 NOTE — PROGRESS NOTES
Mary Carmen Viera is a 59 year old female.    Chief Complaint   Patient presents with    Ear Problem     Patient is here due to bilateral ear pain, reports worsening in left ear x 1 month.   Reports ringing in ears       HISTORY OF PRESENT ILLNESS  He presents today with a history of enlarging lesion of the right neck.  She states that this becomes hard at times and she will scrape it off and will note subsequent bleeding from the structure.  No other signs, symptoms or complaints.  Here for further evaluation and management.     5/24/24 she is now 8 days out from excision of a lesion of her right neck which path revealed to be a benign inflamed seborrheic keratosis.  Here for suture and dressing removal.  Path reviewed with patient      2/14/25 presents with ear pain primarily on the left side for the last month.  Now on the right side comes and goes on the left.  She has been on 2 antibiotics.  She presents back given by her primary care physician that she only took 1 pill and subsequently went to urgent care was told that she had an ear infection and was started on antibiotics as well as eardrops.  Still having pain that comes and goes on the left.  Onset tinnitus in both ears in the last month.  No other signs, symptoms or complaint      Social History     Socioeconomic History    Marital status:    Tobacco Use    Smoking status: Never    Smokeless tobacco: Never   Vaping Use    Vaping status: Never Used   Substance and Sexual Activity    Alcohol use: Yes     Alcohol/week: 2.0 standard drinks of alcohol     Types: 2 Cans of beer per week     Comment: occasionally    Drug use: No    Sexual activity: Never     Birth control/protection: Hysterectomy   Other Topics Concern    Caffeine Concern Yes     Comment: 3cups coffee, soda, tea    Pt has a pacemaker No    Pt has a defibrillator No    Reaction to local anesthetic No       Family History   Problem Relation Age of Onset    Other (Other) Father         per mazin  prostatitis    No Known Problems Mother     Diabetes Sister     Diabetes Brother     No Known Problems Son     No Known Problems Maternal Grandmother     No Known Problems Maternal Grandfather     No Known Problems Paternal Grandmother     No Known Problems Paternal Grandfather     No Known Problems Self     No Known Problems Daughter     No Known Problems Maternal Aunt     No Known Problems Paternal Aunt     No Known Problems Maternal Cousin Female     No Known Problems Maternal Cousin Male     No Known Problems Paternal Cousin Female     No Known Problems Paternal Cousin Male     No Known Problems Other     Glaucoma Neg     Macular degeneration Neg     Breast Cancer Neg     Ovarian Cancer Neg     DCIS Neg     LCIS Neg     BRCA gene + Neg     Ashkenazi Restoration Descent Neg        Past Medical History:    Age-related nuclear cataract of both eyes    Anemia    Arthritis    Back problem    Dry eye    per ng schirmer 10mm/5mm    Early cataract    History of blood transfusion    2 units    Lipid screening    per NG    Long-term use of Plaquenil    per ng w/ no toxicity    Lupus    per ng sistemic lupus erythematous ; prednison & plaquenil; lupus nephritis nephrotic range proteinuria    Meibomian gland dysfunction    SLE (systemic lupus erythematosus) (HCC)    Prednisone and Plaquenil       Past Surgical History:   Procedure Laterality Date    Blood transfusions  2013    per ng 2 unit    Cataract  2013    per ng early    Colonoscopy N/A 11/16/2018    Procedure: COLONOSCOPY;  Surgeon: Buddy Regan MD;  Location: [facility] ENDOSCOPY    Hysterectomy  2013    [place] teeth removed  02/2021         REVIEW OF SYSTEMS    System Neg/Pos Details   Constitutional Negative Fatigue, fever and weight loss.   ENMT Negative Drooling.   Eyes Negative Blurred vision and vision changes.   Respiratory Negative Dyspnea and wheezing.   Cardio Negative Chest pain, irregular heartbeat/palpitations and syncope.   GI Negative Abdominal pain and  diarrhea.   Endocrine Negative Cold intolerance and heat intolerance.   Neuro Negative Tremors.   Psych Negative Anxiety and depression.   Integumentary Negative Frequent skin infections, pigment change and rash.   Hema/Lymph Negative Easy bleeding and easy bruising.           PHYSICAL EXAM    There were no vitals taken for this visit.       Constitutional Normal Overall appearance - Normal.   Psychiatric Normal Orientation - Oriented to time, place, person & situation. Appropriate mood and affect.   Neck Exam Normal Inspection - Normal. Palpation - Normal. Parotid gland - Normal. Thyroid gland - Normal.   Eyes Normal Conjunctiva - Right: Normal, Left: Normal. Pupil - Right: Normal, Left: Normal. Fundus - Right: Normal, Left: Normal.   Neurological Normal Memory - Normal. Cranial nerves - Cranial nerves II through XII grossly intact.   Head/Face Normal Facial features - Normal. Eyebrows - Normal. Skull - Normal.        Nasopharynx Normal External nose - Normal. Lips/teeth/gums - Normal. Tonsils - Normal. Oropharynx - Normal.   Ears Normal Inspection - Right: Normal, Left: Normal. Canal - Right: Normal, Left: Normal. TM - Right: Normal, Left: Normal.   Skin Normal Inspection - Normal.        Lymph Detail Normal Submental. Submandibular. Anterior cervical. Posterior cervical. Supraclavicular.   TMJ  Tender to palpation bilaterally   Nose/Mouth/Throat Normal External nose - Normal. Lips/teeth/gums - Normal. Tonsils - Normal. Oropharynx - Normal.   Nose/Mouth/Throat Normal Nares - Right: Normal Left: Normal. Septum -Normal  Turbinates - Right: Normal, Left: Normal.       Current Outpatient Medications:     celecoxib 200 MG Oral Cap, Take 1 capsule (200 mg total) by mouth daily as needed for Pain., Disp: 30 capsule, Rfl: 0    cyclobenzaprine 5 MG Oral Tab, Take 1 tablet (5 mg total) by mouth nightly., Disp: 30 tablet, Rfl: 1    neomycin-polymyxin-hydrocortisone 3.5-62625-7 Otic Suspension, Place 4 drops into the left ear  4 (four) times daily. (Patient not taking: Reported on 2/14/2025), Disp: 10 mL, Rfl: 0    benzonatate 100 MG Oral Cap, 1-2 caps po tid prn for cough (Patient not taking: Reported on 2/14/2025), Disp: 40 capsule, Rfl: 0    hydroxychloroquine 200 MG Oral Tab, Take 1 tablet (200 mg total) by mouth 2 (two) times daily. (Patient not taking: Reported on 2/14/2025), Disp: 180 tablet, Rfl: 3    Omega-3 Fatty Acids (FISH OIL OR), Take by mouth daily. (Patient not taking: Reported on 2/14/2025), Disp: , Rfl:     Vitamin D3 1000 units Oral Tab, Take 1 tablet (1,000 Units total) by mouth 2 (two) times daily. (Patient not taking: Reported on 2/14/2025), Disp: , Rfl:     Calcium Carbonate-Vitamin D 500-200 MG-UNIT Oral Tab, Take 1 tablet by mouth daily. Take 2 tabs. Every day (Patient not taking: Reported on 2/14/2025), Disp: , Rfl:   ASSESSMENT AND PLAN    1. Tinnitus, unspecified laterality    - Audiology Referral - [facility])    2. Otalgia of both ears  Appears to be musculoskeletal in nature.  Appears to be TMJ related.  She does grind her teeth does not have a bite guard.  Having some dental work done in the form of an implant.  I did recommend that she trial Celebrex cyclobenzaprine warm heat soft diet chewing on both sides of mouth and looking into getting a bite guard for the future.  Return to see me in 1 month        This note was prepared using Dragon Medical voice recognition dictation software. As a result errors may occur. When identified these errors have been corrected. While every attempt is made to correct errors during dictation discrepancies may still exist    Puma Bales MD    2/14/2025    1:10 PM

## 2025-03-18 ENCOUNTER — OFFICE VISIT (OUTPATIENT)
Dept: OTOLARYNGOLOGY | Facility: CLINIC | Age: 60
End: 2025-03-18

## 2025-03-18 DIAGNOSIS — H92.03 OTALGIA OF BOTH EARS: ICD-10-CM

## 2025-03-18 DIAGNOSIS — H93.19 TINNITUS, UNSPECIFIED LATERALITY: Primary | ICD-10-CM

## 2025-03-18 PROCEDURE — 99213 OFFICE O/P EST LOW 20 MIN: CPT | Performed by: OTOLARYNGOLOGY

## 2025-03-18 RX ORDER — CELECOXIB 200 MG/1
200 CAPSULE ORAL DAILY PRN
Qty: 30 CAPSULE | Refills: 0 | Status: SHIPPED | OUTPATIENT
Start: 2025-03-18 | End: 2025-04-17

## 2025-03-18 RX ORDER — CYCLOBENZAPRINE HCL 5 MG
5 TABLET ORAL NIGHTLY
Qty: 30 TABLET | Refills: 1 | Status: SHIPPED | OUTPATIENT
Start: 2025-03-18

## 2025-03-18 NOTE — PROGRESS NOTES
Mary Carmen Viera is a 59 year old female.    Chief Complaint   Patient presents with    Follow - Up     Reevaluation of bilateral ears, reports improvements        HISTORY OF PRESENT ILLNESS  He presents today with a history of enlarging lesion of the right neck.  She states that this becomes hard at times and she will scrape it off and will note subsequent bleeding from the structure.  No other signs, symptoms or complaints.  Here for further evaluation and management.     5/24/24 she is now 8 days out from excision of a lesion of her right neck which path revealed to be a benign inflamed seborrheic keratosis.  Here for suture and dressing removal.  Path reviewed with patient      2/14/25 presents with ear pain primarily on the left side for the last month.  Now on the right side comes and goes on the left.  She has been on 2 antibiotics.  She presents back given by her primary care physician that she only took 1 pill and subsequently went to urgent care was told that she had an ear infection and was started on antibiotics as well as eardrops.  Still having pain that comes and goes on the left.  Onset tinnitus in both ears in the last month.  No other signs, symptoms or complaint    3/18/25 last visit started on Celebrex and cyclobenzaprine for presumed TMJ related tinnitus and otalgia.  She has had about 95% improvement in her symptoms on these medications.  She does believe that she grinds her teeth that she has noted that her teeth are worn.  Here to discuss further management.  Has looked into bite guards but they are offering her 1 dose very expensive does not think that she will be able to afford it.  Social History     Socioeconomic History    Marital status:    Tobacco Use    Smoking status: Never    Smokeless tobacco: Never   Vaping Use    Vaping status: Never Used   Substance and Sexual Activity    Alcohol use: Yes     Alcohol/week: 2.0 standard drinks of alcohol     Types: 2 Cans of beer per week      Comment: occasionally    Drug use: No    Sexual activity: Never     Birth control/protection: Hysterectomy   Other Topics Concern    Caffeine Concern Yes     Comment: 3cups coffee, soda, tea    Pt has a pacemaker No    Pt has a defibrillator No    Reaction to local anesthetic No       Family History   Problem Relation Age of Onset    Other (Other) Father         per ng prostatitis    No Known Problems Mother     Diabetes Sister     Diabetes Brother     No Known Problems Son     No Known Problems Maternal Grandmother     No Known Problems Maternal Grandfather     No Known Problems Paternal Grandmother     No Known Problems Paternal Grandfather     No Known Problems Self     No Known Problems Daughter     No Known Problems Maternal Aunt     No Known Problems Paternal Aunt     No Known Problems Maternal Cousin Female     No Known Problems Maternal Cousin Male     No Known Problems Paternal Cousin Female     No Known Problems Paternal Cousin Male     No Known Problems Other     Glaucoma Neg     Macular degeneration Neg     Breast Cancer Neg     Ovarian Cancer Neg     DCIS Neg     LCIS Neg     BRCA gene + Neg     Ashkenazi Gnosticism Descent Neg        Past Medical History:    Age-related nuclear cataract of both eyes    Anemia    Arthritis    Back problem    Dry eye    per ng schirmer 10mm/5mm    Early cataract    History of blood transfusion    2 units    Lipid screening    per NG    Long-term use of Plaquenil    per ng w/ no toxicity    Lupus    per ng sistemic lupus erythematous ; prednison & plaquenil; lupus nephritis nephrotic range proteinuria    Meibomian gland dysfunction    SLE (systemic lupus erythematosus) (HCC)    Prednisone and Plaquenil       Past Surgical History:   Procedure Laterality Date    Blood transfusions  2013    per ng 2 unit    Cataract  2013    per ng early    Colonoscopy N/A 11/16/2018    Procedure: COLONOSCOPY;  Surgeon: Buddy Regan MD;  Location: [facility] ENDOSCOPY    Hysterectomy  2013     [place] teeth removed  02/2021         REVIEW OF SYSTEMS    System Neg/Pos Details   Constitutional Negative Fatigue, fever and weight loss.   ENMT Negative Drooling.   Eyes Negative Blurred vision and vision changes.   Respiratory Negative Dyspnea and wheezing.   Cardio Negative Chest pain, irregular heartbeat/palpitations and syncope.   GI Negative Abdominal pain and diarrhea.   Endocrine Negative Cold intolerance and heat intolerance.   Neuro Negative Tremors.   Psych Negative Anxiety and depression.   Integumentary Negative Frequent skin infections, pigment change and rash.   Hema/Lymph Negative Easy bleeding and easy bruising.           PHYSICAL EXAM    There were no vitals taken for this visit.       Constitutional Normal Overall appearance - Normal.   Psychiatric Normal Orientation - Oriented to time, place, person & situation. Appropriate mood and affect.   Neck Exam Normal Inspection - Normal. Palpation - Normal. Parotid gland - Normal. Thyroid gland - Normal.   Eyes Normal Conjunctiva - Right: Normal, Left: Normal. Pupil - Right: Normal, Left: Normal. Fundus - Right: Normal, Left: Normal.   Neurological Normal Memory - Normal. Cranial nerves - Cranial nerves II through XII grossly intact.   Head/Face Normal Facial features - Normal. Eyebrows - Normal. Skull - Normal.        Nasopharynx Normal External nose - Normal. Lips/teeth/gums - Normal. Tonsils - Normal. Oropharynx - Normal.   Ears Normal Inspection - Right: Normal, Left: Normal. Canal - Right: Normal, Left: Normal. TM - Right: Normal, Left: Normal.   Skin Normal Inspection - Normal.        Lymph Detail Normal Submental. Submandibular. Anterior cervical. Posterior cervical. Supraclavicular.        Nose/Mouth/Throat Normal External nose - Normal. Lips/teeth/gums - Normal. Tonsils - Normal. Oropharynx - Normal.   Nose/Mouth/Throat Normal Nares - Right: Normal Left: Normal. Septum -Normal  Turbinates - Right: Normal, Left: Normal.       Current Outpatient  Medications:     cyclobenzaprine 5 MG Oral Tab, Take 1 tablet (5 mg total) by mouth nightly., Disp: 30 tablet, Rfl: 1    neomycin-polymyxin-hydrocortisone 3.5-62048-0 Otic Suspension, Place 4 drops into the left ear 4 (four) times daily. (Patient not taking: Reported on 3/18/2025), Disp: 10 mL, Rfl: 0    benzonatate 100 MG Oral Cap, 1-2 caps po tid prn for cough (Patient not taking: Reported on 3/18/2025), Disp: 40 capsule, Rfl: 0    hydroxychloroquine 200 MG Oral Tab, Take 1 tablet (200 mg total) by mouth 2 (two) times daily. (Patient not taking: Reported on 3/18/2025), Disp: 180 tablet, Rfl: 3    Omega-3 Fatty Acids (FISH OIL OR), Take by mouth daily. (Patient not taking: Reported on 3/18/2025), Disp: , Rfl:     Vitamin D3 1000 units Oral Tab, Take 1 tablet (1,000 Units total) by mouth 2 (two) times daily. (Patient not taking: Reported on 3/18/2025), Disp: , Rfl:     Calcium Carbonate-Vitamin D 500-200 MG-UNIT Oral Tab, Take 1 tablet by mouth daily. Take 2 tabs. Every day (Patient not taking: Reported on 3/18/2025), Disp: , Rfl:   ASSESSMENT AND PLAN    1. Tinnitus, unspecified laterality    2. Otalgia of both ears  95% better using cyclobenzaprine and Celebrex.  Refills given to patient.  Warm heat soft diet continue chewing on both sides of mouth.  I did ask her to consider at worst getting an OTC bite guard as she manage paperwork for approximately made by car by a dentist.  She does understand the risk that this could potentially make her symptoms worse.  Return to see me in a few months for reevaluation.        This note was prepared using Dragon [employer] voice recognition dictation software. As a result errors may occur. When identified these errors have been corrected. While every attempt is made to correct errors during dictation discrepancies may still exist    Puma Bales MD    3/18/2025    4:47 PM

## 2025-03-21 RX ORDER — HYDROXYCHLOROQUINE SULFATE 200 MG/1
200 TABLET, FILM COATED ORAL 2 TIMES DAILY
Qty: 180 TABLET | Refills: 3 | Status: SHIPPED | OUTPATIENT
Start: 2025-03-21

## 2025-03-21 NOTE — TELEPHONE ENCOUNTER
LOV: 10/14/24  Future Appointments   Date Time Provider Department Center   4/14/2025  3:30 PM [facility]     Summary:  1.  Try tyelnol -arthritis  and ibuprofen  for hands and lower back pain and upper back pain   2.. Cont. hydroxychlrqouine 200mg twice a day    3. Check labs every 6 months.   4. Return to clinic in 6 months -   5. Take fish oil and turmeric -  omega 3 to 2000mg a day - to see if this helps the joint pain   6. Try turmeric   7. Vit d is 2000unitsa d ay-         She will call about if she wants PT for right knee pain   If she is still losing weight she will talk to Dr. Smith           She decliens PT at this time for her lower back.      Rafael Ohara MD  10/14/2024   3:23 PM

## 2025-04-10 ENCOUNTER — LAB ENCOUNTER (OUTPATIENT)
Dept: LAB | Facility: HOSPITAL | Age: 60
End: 2025-04-10
Attending: INTERNAL MEDICINE
Payer: COMMERCIAL

## 2025-04-10 DIAGNOSIS — Z51.81 THERAPEUTIC DRUG MONITORING: ICD-10-CM

## 2025-04-10 DIAGNOSIS — M32.9 SYSTEMIC LUPUS ERYTHEMATOSUS, UNSPECIFIED SLE TYPE, UNSPECIFIED ORGAN INVOLVEMENT STATUS (HCC): ICD-10-CM

## 2025-04-10 LAB
ALBUMIN SERPL-MCNC: 4.4 G/DL (ref 3.2–4.8)
ALBUMIN/GLOB SERPL: 1.8 {RATIO} (ref 1–2)
ALP LIVER SERPL-CCNC: 89 U/L (ref 46–118)
ALT SERPL-CCNC: 18 U/L (ref 10–49)
ANION GAP SERPL CALC-SCNC: 6 MMOL/L (ref 0–18)
AST SERPL-CCNC: 23 U/L (ref ?–34)
BASOPHILS # BLD AUTO: 0.03 X10(3) UL (ref 0–0.2)
BASOPHILS NFR BLD AUTO: 0.9 %
BILIRUB SERPL-MCNC: 1.1 MG/DL (ref 0.3–1.2)
BILIRUB UR QL: NEGATIVE
BUN BLD-MCNC: 12 MG/DL (ref 9–23)
BUN/CREAT SERPL: 20.3 (ref 10–20)
C3 SERPL-MCNC: 80.2 MG/DL (ref 90–170)
C4 SERPL-MCNC: 13.6 MG/DL (ref 12–36)
CALCIUM BLD-MCNC: 9.2 MG/DL (ref 8.7–10.4)
CHLORIDE SERPL-SCNC: 109 MMOL/L (ref 98–112)
CLARITY UR: CLEAR
CO2 SERPL-SCNC: 28 MMOL/L (ref 21–32)
CREAT BLD-MCNC: 0.59 MG/DL (ref 0.55–1.02)
CRP SERPL-MCNC: <0.4 MG/DL (ref ?–1)
DEPRECATED RDW RBC AUTO: 48.1 FL (ref 35.1–46.3)
EGFRCR SERPLBLD CKD-EPI 2021: 104 ML/MIN/1.73M2 (ref 60–?)
EOSINOPHIL # BLD AUTO: 0.25 X10(3) UL (ref 0–0.7)
EOSINOPHIL NFR BLD AUTO: 7.1 %
ERYTHROCYTE [DISTWIDTH] IN BLOOD BY AUTOMATED COUNT: 14.4 % (ref 11–15)
ERYTHROCYTE [SEDIMENTATION RATE] IN BLOOD: 9 MM/HR (ref 0–30)
FASTING STATUS PATIENT QL REPORTED: YES
GLOBULIN PLAS-MCNC: 2.5 G/DL (ref 2–3.5)
GLUCOSE BLD-MCNC: 79 MG/DL (ref 70–99)
GLUCOSE UR-MCNC: NORMAL MG/DL
HCT VFR BLD AUTO: 38.5 % (ref 35–48)
HGB BLD-MCNC: 12.6 G/DL (ref 12–16)
IMM GRANULOCYTES # BLD AUTO: 0.01 X10(3) UL (ref 0–1)
IMM GRANULOCYTES NFR BLD: 0.3 %
KETONES UR-MCNC: NEGATIVE MG/DL
LEUKOCYTE ESTERASE UR QL STRIP.AUTO: NEGATIVE
LYMPHOCYTES # BLD AUTO: 1.22 X10(3) UL (ref 1–4)
LYMPHOCYTES NFR BLD AUTO: 34.8 %
MCH RBC QN AUTO: 29.6 PG (ref 26–34)
MCHC RBC AUTO-ENTMCNC: 32.7 G/DL (ref 31–37)
MCV RBC AUTO: 90.6 FL (ref 80–100)
MONOCYTES # BLD AUTO: 0.23 X10(3) UL (ref 0.1–1)
MONOCYTES NFR BLD AUTO: 6.6 %
NEUTROPHILS # BLD AUTO: 1.77 X10 (3) UL (ref 1.5–7.7)
NEUTROPHILS # BLD AUTO: 1.77 X10(3) UL (ref 1.5–7.7)
NEUTROPHILS NFR BLD AUTO: 50.3 %
NITRITE UR QL STRIP.AUTO: NEGATIVE
OSMOLALITY SERPL CALC.SUM OF ELEC: 295 MOSM/KG (ref 275–295)
PH UR: 6 [PH] (ref 5–8)
PLATELET # BLD AUTO: 192 10(3)UL (ref 150–450)
POTASSIUM SERPL-SCNC: 4.2 MMOL/L (ref 3.5–5.1)
PROT SERPL-MCNC: 6.9 G/DL (ref 5.7–8.2)
PROT UR-MCNC: NEGATIVE MG/DL
RBC # BLD AUTO: 4.25 X10(6)UL (ref 3.8–5.3)
SODIUM SERPL-SCNC: 143 MMOL/L (ref 136–145)
SP GR UR STRIP: 1.02 (ref 1–1.03)
UROBILINOGEN UR STRIP-ACNC: NORMAL
WBC # BLD AUTO: 3.5 X10(3) UL (ref 4–11)

## 2025-04-10 PROCEDURE — 86225 DNA ANTIBODY NATIVE: CPT

## 2025-04-10 PROCEDURE — 85025 COMPLETE CBC W/AUTO DIFF WBC: CPT

## 2025-04-10 PROCEDURE — 85652 RBC SED RATE AUTOMATED: CPT

## 2025-04-10 PROCEDURE — 86160 COMPLEMENT ANTIGEN: CPT

## 2025-04-10 PROCEDURE — 36415 COLL VENOUS BLD VENIPUNCTURE: CPT

## 2025-04-10 PROCEDURE — 80053 COMPREHEN METABOLIC PANEL: CPT

## 2025-04-10 PROCEDURE — 86140 C-REACTIVE PROTEIN: CPT

## 2025-04-10 PROCEDURE — 81001 URINALYSIS AUTO W/SCOPE: CPT

## 2025-04-11 LAB — DSDNA IGG SERPL IA-ACNC: 8.7 IU/ML (ref ?–10)

## 2025-04-12 ENCOUNTER — TELEPHONE (OUTPATIENT)
Dept: NEPHROLOGY | Facility: CLINIC | Age: 60
End: 2025-04-12

## 2025-04-12 NOTE — TELEPHONE ENCOUNTER
The labs that Dr. BELLO ordered shows your kidney function remains normal.  Urinalysis shows just a trace of blood in the urine but otherwise was unremarkable.  If doing well just repeat labs in 6 months and then see me for yearly follow-up.

## 2025-04-14 ENCOUNTER — OFFICE VISIT (OUTPATIENT)
Age: 60
End: 2025-04-14

## 2025-04-14 VITALS
SYSTOLIC BLOOD PRESSURE: 108 MMHG | WEIGHT: 142.19 LBS | HEART RATE: 62 BPM | BODY MASS INDEX: 25.2 KG/M2 | HEIGHT: 63 IN | RESPIRATION RATE: 16 BRPM | DIASTOLIC BLOOD PRESSURE: 58 MMHG

## 2025-04-14 DIAGNOSIS — M32.9 SYSTEMIC LUPUS ERYTHEMATOSUS, UNSPECIFIED SLE TYPE, UNSPECIFIED ORGAN INVOLVEMENT STATUS (HCC): Primary | ICD-10-CM

## 2025-04-14 DIAGNOSIS — Z51.81 THERAPEUTIC DRUG MONITORING: ICD-10-CM

## 2025-04-14 DIAGNOSIS — E55.9 VITAMIN D DEFICIENCY: ICD-10-CM

## 2025-04-14 PROCEDURE — 99214 OFFICE O/P EST MOD 30 MIN: CPT | Performed by: INTERNAL MEDICINE

## 2025-04-14 NOTE — PATIENT INSTRUCTIONS
1.  Try tyelnol -arthritis  and ibuprofen  for hands and lower back pain and upper back pain   2.. Cont. hydroxychlrqouine 200mg twice a day    3. Check labs every 6 months.   4. Return to clinic in 6 months -   5. Take fish oil and turmeric -  omega 3 to 2000mg a day - to see if this helps the joint pain   6. Try turmeric   7. Vit d is 2000unitsa maria alejandra avery-

## 2025-04-14 NOTE — PROGRESS NOTES
HPI:     Chief Complaint   Patient presents with    SLE    Medication Follow-Up    Lab Results    Joint Pain       I had the pleasure of seeing Mrs. Mary Carmen Viera. . As you recall, she is a pleasant 55  year old who has a hx of SLE , sLE IV nephritis in 2008 .   She's on plaquenil 200mg bid. She was on azathioprine and now off.     3/12/2018  The physical therapy helped her but she stil lhast hte pain. It's not as bad as before. She has 4/10 pain. She keeps doing the exercises.   She lost 6 pounds since her last visit.   She is moving more and the therapy exercises are doing better.   Her back is better. It's not completley 100% but it's better than before.     11/2/2018  She's doing ok. Nothing worse. She has 3/10 pain in her back. She has mid back pain in her kidneys.   She saw dr. morris yesterday - no hcq toxicity.   She occl has a few palpitations.     6/17/2019  hs'e doing ok. She soemtimes has a lot of pain in her upper back and wrists.   Also her ankles hurt both sides - but more in her right ankle. She has noticed this going on for 1 month now.   She has 2/10 pain.   Her mid back is still sore. She has trouble lifting heavy thing.s     3/16/2020  She has 4/10 pain in her low back and neck pain. She has some hand pain too but it's not bad.   seh has trouble lifting things stil - likely from work.   Her ankles are ok. But she has a little bit of pain in he rright ankel.   No rashes, no oral ulcers.   She works at the [employer]. She had a mild cough 1 month ago.   Sometimes she feels chest tight and sometimes tachycardia. It's nota ll the time.   Echo oin 2016 was normal.   Her white count is lower than normal at 3.4 in 1/2020.     11/16/2020  VIDEO VISIT  She feels bad. She has fever and headahes and chest pain  . All her joints hurt.   Her feet hurt. She had a bad headache from behind her ear to her other ear.   She was scared b/c she feels bad.  Her fever is around 100.8F and sometimes 102.6F   No  sob. She feels mostly pain. She has body pain, her knees are hurting. She has bad muscle pains as well.   She got covid testing last week and this was negative.   Then she went to the ER on 11/12/2020 - covid was run for the 2nd time and it was negative.   Rapid strep was negative.   She doesn't know what is happening.   Her recent labs on 11/12/2020 - showed low wbc of 3.0 and plt slightly low.   She was a little runny nose today.   Last night she had fevers.   She had viral pneumonitis in left upper lobe     12/14/2020   She has a viral pnemonitis in 11/2020. She feels achy and flared up.   She felt better on medrol torres and the pain was better until 1 week ago.   She has about 3/10 pain - it's in her upper back. shes' wondering if this is still lupus with the pain coming   She had follow up chest ray on 11/18 - that showed resolution of opacities.   She had general aches and the covid tests was negative.   She had labs on 11/18/2020 - sed rate 39mm/hr. And crp 1.2mg/dL,   She has microalbuminuria 840  Her creatinine is normal.   Her wbc is 2.9 with lypmhopenia.     9/15/2021  She's feels so so.   She had a accidental needle stick at work.   She was to get the hep b vaccine. Her last one is in January.   She was also checked for HIV this is negative and she will check again in 2/2022.     She went to her dentist in 2/2021 and gotten two wisdom teeth removed and she still have pain and swelling over the left cheek.   She is going to try accupuncutre with the chiropracter. She is going to see a neurologist for this pain . This pain comes and goes . The pain is not all the time but over her left jaw. She feels sensitive . No numbness or tingling. It's a sharp apin close to her ear. It's not so bad. She was dx with TMJ with her dentist and referred to PT.   She hasn't gone yet - she is thinking about chiropractic care instead. In the begnining she had numbnes over her right cheek , now it's better.     7/18/2022  She  has pain in heir hands and she has more pain in her upper back and mid back.   She has about 4/10 pain in her hands. Her back is around 3/10 pain - sometimes it can goto t6/10 pain.   She still has trigmeinal neuralgia over her left cheek. She got ct scan but ahns't gotten this yet.   She saw dr. Bales and given celebrex but it didn' thelp. Much.   She has more pain with hare hands with working and it's better on the weekend.     4/12/2023  She has a little bit of pain in her  hands wrists and ankles,, soemtems there is a burning in the bottom of her feet.   She has about 4-5/10 pain in her feet.   She has 4/10 pain in her hands.   The pain is burning in the feet.     2/7/2024  She feels so so - her neck and upper back pain is stilll there.   She feels dizziness as well at times.   When she is walking she feels she gets dizzy. She gets weak down her right shoulder and right leg. - this happens at times.   In December she had bad consitpation- she was having pain - and given dicyclomine - and took one bottle without refills.   She's ok now.   It's weird b/c she never had constipation.   She took medicine and she had her teeth fixed.   She's not exercising.     She is losing weight unitentionally -3-4 months.   She does skip breakfast .   She does get burning on her feet at times - it's not as bad.   Her ingers - her pip joitns are hurting. And her right knee hurts with pain and burning.   She has about 6 /10 at the most in theses joints - like her right knee.     She took a medrol torres -and amoxicillin,  for dental work - for her upper tooth and her lower teeth.   It did help her pain -   She is also taking ibuprofen 800mg a day -     10/14/2024  She still has a litlte bit of joitn pain.   She has 4/10 pain.   I'ts not bad.   She overall is stable     4/14/2025  She's in a little bit pain - her shoulders hurts a little bit.   No flares recently .   N oswelling.   Was having jaw pain -     HISTORY:  Past Medical  History:    Age-related nuclear cataract of both eyes    Anemia    Arthritis    Back problem    Dry eye    per ng schirmer 10mm/5mm    Early cataract    History of blood transfusion    2 units    Lipid screening    per NG    Long-term use of Plaquenil    per ng w/ no toxicity    Lupus    per ng sistemic lupus erythematous ; prednison & plaquenil; lupus nephritis nephrotic range proteinuria    Meibomian gland dysfunction    SLE (systemic lupus erythematosus) (HCC)    Prednisone and Plaquenil      Social Hx Reviewed   Family Hx Reviewed     Medications (Active prior to today's visit):  Current Outpatient Medications   Medication Sig Dispense Refill    hydroxychloroquine 200 MG Oral Tab Take 1 tablet (200 mg total) by mouth 2 (two) times daily. 180 tablet 3    cyclobenzaprine 5 MG Oral Tab Take 1 tablet (5 mg total) by mouth nightly. 30 tablet 1    celecoxib 200 MG Oral Cap Take 1 capsule (200 mg total) by mouth daily as needed for Pain. 30 capsule 0    Omega-3 Fatty Acids (FISH OIL OR) Take by mouth daily.      Vitamin D3 1000 units Oral Tab Take 1 tablet (1,000 Units total) by mouth 2 (two) times daily.      Calcium Carbonate-Vitamin D 500-200 MG-UNIT Oral Tab Take 1 tablet by mouth daily. Take 2 tabs. Every day      neomycin-polymyxin-hydrocortisone 3.5-31515-0 Otic Suspension Place 4 drops into the left ear 4 (four) times daily. (Patient not taking: Reported on 4/14/2025) 10 mL 0    benzonatate 100 MG Oral Cap 1-2 caps po tid prn for cough (Patient not taking: Reported on 4/14/2025) 40 capsule 0       Allergies:  No Known Allergies      ROS:   All other ROS are negative.     PHYSICAL EXAM:   HEENT:  clear sclera  PERRLA, pleasant, no acute distress, no CAD, no neck tendnerness, good ROM,   No rashes - no malar rash  Left ear , mxilalary area and jaw tender - likley tmj  Tapping did not cause any tingling.   CVS: RRR,  RS:  CTAB  ABD: Soft not tender   Joint exam :  Tenderness in lower back  and upper back   Right  3rd pip - oa change stender with mild swelling.   Tender in left 3rd finger with mid swelling  Right 2nd and 5th dip tender with mild heberdone nodes   Early oa changes in hadns   B/ lwrists mild tender   Not tender in bl knees, right knee crepitus felt -   No synovitis seen     Component      Latest Ref Rng 4/10/2025   WBC      4.0 - 11.0 x10(3) uL 3.5 (L)    RBC      3.80 - 5.30 x10(6)uL 4.25    Hemoglobin      12.0 - 16.0 g/dL 12.6    Hematocrit      35.0 - 48.0 % 38.5    MCV      80.0 - 100.0 fL 90.6    MCH      26.0 - 34.0 pg 29.6    MCHC      31.0 - 37.0 g/dL 32.7    RDW-SD      35.1 - 46.3 fL 48.1 (H)    RDW      11.0 - 15.0 % 14.4    Platelet Count      150.0 - 450.0 10(3)uL 192.0    Prelim Neutrophil Abs      1.50 - 7.70 x10 (3) uL 1.77    Neutrophils Absolute      1.50 - 7.70 x10(3) uL 1.77    Lymphocytes Absolute      1.00 - 4.00 x10(3) uL 1.22    Monocytes Absolute      0.10 - 1.00 x10(3) uL 0.23    Eosinophils Absolute      0.00 - 0.70 x10(3) uL 0.25    Basophils Absolute      0.00 - 0.20 x10(3) uL 0.03    Immature Granulocyte Absolute      0.00 - 1.00 x10(3) uL 0.01    Neutrophils %      % 50.3    Lymphocytes %      % 34.8    Monocytes %      % 6.6    Eosinophils %      % 7.1    Basophils %      % 0.9    Immature Granulocyte %      % 0.3    Glucose      70 - 99 mg/dL 79    Sodium      136 - 145 mmol/L 143    Potassium      3.5 - 5.1 mmol/L 4.2    Chloride      98 - 112 mmol/L 109    Carbon Dioxide, Total      21.0 - 32.0 mmol/L 28.0    ANION GAP      0 - 18 mmol/L 6    BUN      9 - 23 mg/dL 12    CREATININE      0.55 - 1.02 mg/dL 0.59    BUN/CREATININE RATIO      10.0 - 20.0  20.3 (H)    CALCIUM      8.7 - 10.4 mg/dL 9.2    CALCULATED OSMOLALITY      275 - 295 mOsm/kg 295    EGFR      >=60 mL/min/1.73m2 104    ALT (SGPT)      10 - 49 U/L 18    AST (SGOT)      <34 U/L 23    ALKALINE PHOSPHATASE      46 - 118 U/L 89    Total Bilirubin      0.3 - 1.2 mg/dL 1.1    PROTEIN, TOTAL      5.7 - 8.2 g/dL 6.9     Albumin      3.2 - 4.8 g/dL 4.4    Globulin      2.0 - 3.5 g/dL 2.5    A/G Ratio      1.0 - 2.0  1.8    Patient Fasting for CMP? Yes    Color Urine      Yellow  Light-Yellow    Clarity Urine      Clear  Clear    Spec Gravity      1.005 - 1.030  1.017    Glucose Urine      Normal mg/dL Normal    Bilirubin Urine      Negative  Negative    Ketones, UA      Negative mg/dL Negative    Blood Urine      Negative  1+ !    PH Urine      5.0 - 8.0  6.0    Protein Urine      Negative mg/dL Negative    Urobilinogen Urine      Normal  Normal    Nitrite Urine      Negative  Negative    Leukocyte Esterase       Negative  Negative    WBC Urine      0 - 5 /HPF 1-5    RBC Urine      0 - 2 /HPF 3-5 !    Bacteria Urine      None Seen /HPF None Seen    SQUAM EPI CELLS UR      None Seen /HPF Few !    RENAL TUBULAR EPITHELIAL CELLS      None Seen /HPF None Seen    TRANSITIONAL EPI CELLS      None Seen /HPF None Seen    YEAST URINE      None Seen /HPF None Seen    C-REACTIVE PROTEIN      <1.00 mg/dL <0.40    SED RATE      0 - 30 mm/Hr 9    Anti-dsDNA antibody      <10 IU/mL 8.7    COMPLEMENT C4      12.0 - 36.0 mg/dL 13.6    COMPLEMENT C3      90.0 - 170.0 mg/dL 80.2 (L)       Legend:  (L) Low  (H) High  ! Abnormal      8/22/2016 - right knee xray   Mild/early degenerative changes in the patellofemoral compartment with  trace effusion.     Mild prepatellar soft tissue swelling.  5/21/2016 - cxr -   1. Tortuous aorta otherwise normal chest unchanged from January 28, 2013.    11/4/2016 - lumbar spine xray   1. Minimal osteoarthritis.  2. Minimal osteitis condensans ilii.  11/4/2016 - pelvis xray - no acute disease     11/12/2020 -   Chest xray   CONCLUSION:   1. Multifocal pneumonitis versus atypical viral pneumonia most pronounced in the left apex..    2. Owmqdk-hp-ev to interval resolution.      6/20/2020 - 2decho  Study Conclusions   1. Left ventricle: The cavity size was mildly dilated. Wall      thickness was normal. Systolic function  was normal. The      estimated ejection fraction was 65%, by biplane method of disks.      Wall motion was normal; there were no regional wall motion      abnormalities. Left ventricular diastolic function parameters      were normal.   2. Aortic valve: Mild regurgitation.   3. Mitral valve: Mild regurgitation.   4. Left atrium: The atrium was mildly dilated.   5. Impressions: Normal pulmonary artery pressure.   Compared to the prior study there has been no significant interval   Change      11/18/2020 - chesty xray   CONCLUSION:      Interval improvement of previously described left upper lobe opacity with mild residual opacity remaining.  Recommend continued short-term follow-up chest radiographs to document imaging resolution.         1. SLE (systemic lupus erythematosus) ([facility])  1. 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 Smith, this doesn't seem to be from SLE - lupus seems in remission   -  cont. hydroxychlrooquine 200mg bid  -   - she's avoiding celebrexa and ibupofen - she can call for script - she can take as needed for upper back pain - a, she willt ry tyelno.   - ok to take ibuprofen as needed for joitn pain -   - off azathiorpin 50mg a day now -but can add back if joint pain increases   - taking omega 3 - in cresae to 2000mg a day -   - mild porteinuria - has imrpove d- , mild hematuria - has seen dr. paniagua in the past   - headache is 2 days a week - bulbital given - hx of migraines - it helps her -   = echo 6/2020 - normal  - if joint pain - is wrose - can add back azathioprine   In the past:   - flared after  viral infection on 11/12/2020 - -leukopenia and slightly low platelets now with high fever and pneumonitis on chest xray ,  better after  medrol torres -- tyelnol as needed -  And s/pt  prednisone 5mg a day. X 1month   2. Eye exam done Normal, 6/2024 -   3. Vaginal bleeding - s/p hysterectomy 2/2013 - hb is stable.   4. righ tknee pain - declines steroid inejction , - cont. Home exercises learned in pt - tylenol arthriits for pain ,voltaren gel 1% -   5. Lower back pain - and now upper back pain - celebrx as needed  -  off cymbalta 60mg ad ya - per dr. Price.   6. Long d/w pt - that she can get the covid vaccine   7. Plantar fasciiits - both feet - exercises given, orthotics recommeneded.  better  8 .hx of  left sided TMJ - now off of  baclofen  Or cyclobenzaprine 5mg at night - f/u with ent or neurology - improved   9. Vit d def - check level   10. Eye exam - 6/2025 -   Summary:  1.  Try tyelnol -arthritis  and ibuprofen  for hands and lower back pain and upper back pain   2.. Cont. hydroxychlrqouine 200mg twice a day    3. Check labs every 6 months.   4. Return to clinic in 6 months -   5. Take fish oil and turmeric -  omega 3 to 2000mg a day - to see if this helps the joint pain   6. Try turmeric   7. Vit d is 2000unitsa d ay-       She will call about if she wants PT for right knee pain   If she is still losing weight she will talk to Dr. Smith  She decliens PT at this time for her lower back.     Rafael Ohara MD  4/14/2025   4:05 PM         is applicable because the patient's medical record notes reflects the ongoing nature of the continuous longitudinal relationship of care, and the medical record indicates that there is ongoing treatment of a serious/complex medical condition.

## 2025-04-15 NOTE — TELEPHONE ENCOUNTER
Called verify last name and , notify of note below,verbalized understanding.  Schedule on 10/24/2025 for year appointment.

## 2025-06-10 ENCOUNTER — TELEPHONE (OUTPATIENT)
Dept: FAMILY MEDICINE CLINIC | Facility: CLINIC | Age: 60
End: 2025-06-10

## 2025-06-10 DIAGNOSIS — Z12.31 SCREENING MAMMOGRAM FOR BREAST CANCER: Primary | ICD-10-CM

## 2025-06-10 NOTE — TELEPHONE ENCOUNTER
Dr. Smith,    Patient is requesting order for mammogram.    Last office visit: 01-  Last mammogram: 07-    Last impression report  Impression   CONCLUSION:   No mammographic evidence for breast malignancy.  As long as the patient's clinical breast exam remains unchanged, annual screening mammogram is recommended.   RECOMMENDATIONS:   ROUTINE MAMMOGRAM AND CLINICAL EVALUATION IN 12 MONTHS.          Orders pended, please review and sign if appropriate.    Thank you,  Mercy MANE MA

## 2025-07-15 ENCOUNTER — HOSPITAL ENCOUNTER (OUTPATIENT)
Dept: MAMMOGRAPHY | Facility: HOSPITAL | Age: 60
Discharge: HOME | End: 2025-07-15
Attending: FAMILY MEDICINE
Payer: COMMERCIAL

## 2025-07-15 DIAGNOSIS — Z12.31 SCREENING MAMMOGRAM FOR BREAST CANCER: ICD-10-CM

## 2025-07-15 PROCEDURE — 77063 BREAST TOMOSYNTHESIS BI: CPT | Performed by: FAMILY MEDICINE

## 2025-07-15 PROCEDURE — 77067 SCR MAMMO BI INCL CAD: CPT | Performed by: FAMILY MEDICINE

## (undated) DEVICE — ENDOSCOPY PACK - LOWER: Brand: MEDLINE INDUSTRIES, INC.

## (undated) DEVICE — Device: Brand: DEFENDO AIR/WATER/SUCTION AND BIOPSY VALVE

## (undated) NOTE — MR AVS SNAPSHOT
[address]  [address] [place] [phone]               Thank you for choosing us for your health care visit with Medina Viera PT.   We are glad to serve you and happy to provide you with this summary o

## (undated) NOTE — LETTER
November 1, 2018    Neetu Oleary MD  [address] [facility]     Patient: Denver Giraldo   YOB: 1965   Date of Visit: 11/1/2018       Dear Dr. Mervat Wilkerson MD:    Thank you for referring Denver Giraldo to me for evaluation.  H • No Known Problems Maternal Grandfather    • No Known Problems Paternal Grandmother    • No Known Problems Paternal Grandfather    • No Known Problems Self    • No Known Problems Daughter    • No Known Problems Maternal Aunt    • No Known Problems Delilah Reyna Correction:  Glasses          Tonometry (Tonopen, 4:17 PM)       Right Left    Pressure 11 15          Pupils       Pupils    Right PERRL    Left PERRL          Visual Fields (Counting fingers)       Left Right     Full Full          Extraocular Movement Use over the counter Alaway or Zaditor twice daily as needed for itching/ allergy symptoms. Info given. Age-related nuclear cataract of both eyes  Discussed early cataracts with patient. No treatment recommended at this time.        Bianca

## (undated) NOTE — LETTER
2/15/2019              Jelani Viera        [address]        [address] [facility]         Dear Jelani Buck,    [address] [facility] records indicate that the lab tests ordered for you by Mandeep Rice MD  have not been done.   If you have, in fact, already co

## (undated) NOTE — MR AVS SNAPSHOT
[address]  [address] [place] [phone]               Thank you for choosing us for your health care visit with Nupur Hill PT.   We are glad to serve you and happy to provide you with this summary o [facility] PT Visit with OBED Cardenas in [address] [place] [address] ([address] [facility])    [address] [facility] [phone]           Please arrive at your scheduled appointment time.   Wear comfortable, pain for improved donning socks and shoes   3. Pt to report reduced urinary leakage with coughing or laughing to no more than 1x/month for improved pelvic floor strength   4.  Pt to improve strength in all muscles tested below 5 by 1/2 MMT grade for improve

## (undated) NOTE — MR AVS SNAPSHOT
[address]  [phone]               Thank you for choosing us for your health care visit with Elías Holley.  Jordy Willson MD.  We are glad to serve you and happy to provide you with this summar Jul 19, 2017  9:45 AM   [facility] Visit By Therapist with Siena Montalvo, [address] [facility] ([address])    [address]   [phone]           Please arrive at Commonly known as:  DELTASONE           Vitamin D3 1000 units Tabs   Take 1,000 Units by mouth 2 (two) times daily.    Commonly known as:  VITAMIN D3                   [employer]     Call the [employer] for assistance with your inactive [employer] account    If you

## (undated) NOTE — LETTER
No referring provider defined for this encounter. 05/15/20        Patient: Lizette Schmitz   YOB: 1965   Date of Visit: 5/15/2020       Dear  Dr. Severo Fruit, MD,      Thank you for referring Lizette Schmitz to my practice.   Please find Document electronically generated by:  Venkat Washington

## (undated) NOTE — MR AVS SNAPSHOT
[address]  [address] [place] [phone]               Thank you for choosing us for your health care visit with Gonsalo Lawler PT.   We are glad to serve you and happy to provide you with this summary o [facility] PT Visit with OBED Green in [address] [place] [address] ([address] [facility])    [address] [facility] [phone]           Please arrive at your scheduled appointment time.   Wear comfortable, pain for improved donning socks and shoes   3. Pt to report reduced urinary leakage with coughing or laughing to no more than 1x/month for improved pelvic floor strength   4.  Pt to improve strength in all muscles tested below 5 by 1/2 MMT grade for improve

## (undated) NOTE — MR AVS SNAPSHOT
[address]  [address] [place] [phone]               Thank you for choosing us for your health care visit with Gail Dillon PT.   We are glad to serve you and happy to provide you with this summary o [facility] Visit with Lisbeth Stanton, PT   [facility] in [address] [place] [address] ([address] [facility])    [address] [facility] [phone]           Please arrive at your scheduled appointment time.   Wear comfortable, pain for improved donning socks and shoes   3. Pt to report reduced urinary leakage with coughing or laughing to no more than 1x/month for improved pelvic floor strength   4.  Pt to improve strength in all muscles tested below 5 by 1/2 MMT grade for improve

## (undated) NOTE — Clinical Note
Chest xray looks like viral pneumonitis - tcovid neg x 2 This could have set off a lupus flare. In any case I have given her a medrol torres - for pneumonitis and lupus. She does not feel sob.    She sees you tomorrow and told her to make it video visit or v

## (undated) NOTE — LETTER
October 11, 2017    Angelo Montiel MD  [facility]     Patient: Marci Machuca   YOB: 1965   Date of Visit: 10/11/2017       Dear Dr. Lisandro Walton MD:    Thank you for referring Marci Machuca to me for evaluation Medications:    Current Outpatient Prescriptions:  Omega-3 Fatty Acids (FISH OIL OR) Take by mouth daily. Disp:  Rfl:    Vitamin D3 1000 units Oral Tab Take 1,000 Units by mouth 2 (two) times daily.  Disp:  Rfl:    predniSONE 5 MG Oral Tab Take 1 tablet (5 Normal Normal          Color       Right Left    Ishihara 5/5 5/5            Slit Lamp and Fundus Exam     Slit Lamp Exam       Right Left    Lids/Lashes Dermatochalasis, Meibomian gland dysfunction Dermatochalasis, Meibomian gland dysfunction    Conjunc No prescriptions requested or ordered in this encounter     Follow up instructions:  Return in about 1 year (around 10/11/2018) for Plaquenil eye exam.    10/11/2017  Scribed by: Chuy Rock MD      If you have questions, please do not hesitate to arturo

## (undated) NOTE — MR AVS SNAPSHOT
[address]  [address] [place] [phone]               Thank you for choosing us for your health care visit with Gail Dillon PT.   We are glad to serve you and happy to provide you with this summary o Please arrive at your scheduled appointment time. Wear comfortable, loose fitting clothing.               [employer]     Call the [employer] for assistance with your inactive [employer] account    If you have questions, you can call [phone] to talk to our

## (undated) NOTE — MR AVS SNAPSHOT
[place]  [address]  [phone]               Thank you for choosing us for your health care visit with Han Bennett MD.  We are glad to serve you and happy to provide you with this summary [address] [facility]   Phone:  [phone]   Fax:  [phone]    Diagnoses:  Rheumatoid arthritis, involving unspecified site, unspecified rheumatoid factor presence ([facility] 75.)   Order:  Rheumatology - Internal    Nguyen Oliveira MD clinic staff will provide you with the phone number you should call to schedule your appointment. [facility], [facility] Ophthalmology (Adult & Peds)  [address] [facility]    If you are confident that your benefit plan will not requir Rheumatoid arthritis, involving unspecified site, unspecified rheumatoid factor presence          Instructions and Information about Your Health     None      Allergies as of May 30, 2017     No Known Allergies                Today's Vital Signs     BP Pu Avoid over sized portions. Don’t eat while when you’re bored.      EAT THESE FOODS MORE OFTEN: EAT THESE FOODS LESS OFTEN:   Make half your plate fruits and vegetables Highly refined, white starches including white bread, rice and pasta   Eat plenty of pr

## (undated) NOTE — LETTER
No referring provider defined for this encounter. 01/12/22        Patient: Koffi Rowe   YOB: 1965   Date of Visit: 1/12/2022       Dear  Dr. Jack Denise MD,      Thank you for referring Koffi Rowe to my practice.   Please find [phone]    Document electronically generated by:  Yamilex Cook MD

## (undated) NOTE — MR AVS SNAPSHOT
[address]  [address] [place] [place] [phone]               Thank you for choosing us for your health care visit with Melanie Deshpande PT.   We are glad to serve you and happy to provide you with this summary o Visit [email or web address] to learn more.

## (undated) NOTE — LETTER
No referring provider defined for this encounter. 04/06/18        Patient: Soledad Tobias   YOB: 1965   Date of Visit: 4/6/2018       Dear  Dr. Boubacar Julien MD,      Thank you for referring Soledad Tobias to my practice.   Please find She knows to call if she develops any of those symptoms. Otherwise I recommended just repeating a renal panel, urinalysis and urine for microalbumin in 3 months. Thank you again for allowing me to participate in the care of your patient.   If you have

## (undated) NOTE — MR AVS SNAPSHOT
[address]  [address] [place] [phone]               Thank you for choosing us for your health care visit with Abril Elliott PT.   We are glad to serve you and happy to provide you with this summary o loose fitting clothing.             Jan 30, 2017  3:15 PM   [facility] Visit with Lisbeth Stanton PT   [facility] in [address] [place] [address] 402 ([address] [facility])    [address] [facility] 43   [phone]           Please ar

## (undated) NOTE — MR AVS SNAPSHOT
[address]  [facility] [address]  [phone]               Thank you for choosing us for your health care visit with Alexis Avery PT.   We are glad to serve you and happy to provide you wi Please arrive at your scheduled appointment time. Wear comfortable, loose fitting clothing.             Aug 02, 2017  9:45 AM   [facility] Visit By Therapist with Emely Toth, [facility] -

## (undated) NOTE — MR AVS SNAPSHOT
[address]  [address] [place] [phone]               Thank you for choosing us for your health care visit with Hector Garcia PT.   We are glad to serve you and happy to provide you with this summary o [place] [facility] Visit By Therapist with OBED Benavides in [place] ([address] [facility])    [address] [place] [address] [phone]           Please arrive at your scheduled appointment time.   Wear co pain for improved donning socks and shoes   3. Pt to report reduced urinary leakage with coughing or laughing to no more than 1x/month for improved pelvic floor strength   4.  Pt to improve strength in all muscles tested below 5 by 1/2 MMT grade for improve

## (undated) NOTE — MR AVS SNAPSHOT
[address]  [address] [place] [address] [phone]               Thank you for choosing us for your health care visit with Debi Berman MD.  We are glad to serve you and happy to provide you with this summary of your visit Diagnostics [address] [facility] ([address]) [address] [facility] [place], [address] [facility], [facility]    [address]  [address] [place] [address]    It is the patient's Sheridan Keyes Pantoprazole Sodium 40 MG Tbec   Take 1 tablet (40 mg total) by mouth every morning before breakfast.   Commonly known as:  PROTONIX           predniSONE 5 MG Tabs   Take 1 tablet (5 mg total) by mouth daily.    Commonly known as:  Merced Grissom ? Keep floors clear of clutter. ? Make sure carpets and area rugs have skid proof backing. ? Do not use slippery wax on bare floors. ? Keep furniture in its accustomed place. ? If you have pets, be careful that you don’t trip over them.     OUTSIDE SAF

## (undated) NOTE — LETTER
[address]  [id number] [phone]            Patient: Yessenia Salgado   YOB: 1965   Date of Visit: 11/12/2020       [facility],       November 12, 2020      En [place] ? Flores pasado al menos 10 días (20 días para las personas con marla enfermedad grave que requirió hospitalización) desde que iniciaron los síntomas y  ?  Flores pasado al menos 24 horas desde que se redujo la fiebre sin el uso de medicamentos para bajar la fiebre

## (undated) NOTE — MR AVS SNAPSHOT
[address]  [address] [place] [phone]               Thank you for choosing us for your health care visit with Josie Quiroz PT.   We are glad to serve you and happy to provide you with this summary o Please arrive at your scheduled appointment time. Wear comfortable, loose fitting clothing.             Feb 09, 2017  3:15 PM   [facility] Visit By Therapist with Bjorn Fernandez PT   [facility] in [address] [place] [address]

## (undated) NOTE — LETTER
No referring provider defined for this encounter. 12/16/20        Patient: Aixa Esparza   YOB: 1965   Date of Visit: 12/16/2020       Dear  Dr. Niyah Navarro MD,      Thank you for referring Aixa Esparza to my practice.   Please fin I reviewed her most recent labs which she just did on December 14, 2020.   Creatinine remains normal at 0.60 and her urinalysis was normal.  Her urine microalbumin to creatinine ratio has also improved down to 40 and sed rate and C-reactive protein have nor

## (undated) NOTE — LETTER
No referring provider defined for this encounter.       11/13/24        Patient: Mary Carmen Viera   YOB: 1965   Date of Visit: 11/13/2024       Dear  Dr. Sarah MD,      Thank you for referring Mary Carmen Viera to my practice.  Please find my assessment and plan below.      As you know she is a 58-year-old female with a history of systemic lupus erythematosus.  Lupus nephritis diagnosed in 2008, class IV and class V who is now here for follow-up.  Last seen in January 2022.  Overall states she has been doing well without any chest pain, shortness of breath, GI or urinary tract symptoms.  No symptoms to suggest active lupus.  I last saw her in January 2022.  Sees rheumatology every 6 months.    Physical exam initial blood pressure 97/65.  Repeat is 110/70 with a pulse of 72 and she weighed under 40 pounds.  Her neck was supple without JVD.  Lungs were clear.  Heart revealed a regular rate and rhythm without gallops or murmurs.  Abdomen was soft, flat, nontender without organomegaly, masses or bruits.  Extremities revealed no edema.  No significant skin rashes or synovitis.    I reviewed her most recent labs done in October 2024.  Creatinine remains normal at 0.63.  Urinalysis showed 1-5 WBCs 0-2 RBCs and no protein.  Recent sed rate, C-reactive protein and anti-double-stranded DNA were all negative.    I therefore reassured the patient that there is no evidence for active SLE and kidney function remains normal.  She will continue to see rheumatology every 6 months.  Recommended she see me in 1 year for follow-up or sooner if clinically indicated.    Thank you again for allowing me to participate in the care of your patient.  If you have any questions please feel free to call.           Sincerely,   Earl English MD   [facility], [facility], [place]  [address] [facility] [address]  [facility] [address]    Document electronically generated by:  Earl English  MD

## (undated) NOTE — LETTER
11/24/2020          To Whom It May Concern:    Sheron Bailey is currently under my medical care. Please be advised that due to her chronic medical condition her immune system has been compromised and she is under higher risk if gets Covid infection.    My

## (undated) NOTE — LETTER
12/5/2019              Rayna Viera        [address] [facility] [address]        [place] [address]         Dear Rayna Martinez,    It was a pleasure to see you. Your mammogram was normal.  There is no need for further testing at this time.   I look forward to see

## (undated) NOTE — Clinical Note
Patient Name: Jono Castaneda,    : 1965,    MRN: [id number]   Date:  2017  Referring Physician:  Dr Mary Dia  Diagnosis: Lumbar sprain S33. 5XXA,    Discharge  Summary    Pt has attended 19 visits  in Physical Therapy.      Progress Note Abduction    5/5    5/5          Adduction    4+/5    4+/5          External Rotation    4+/5    4+/5          Internal Rotation    5/5    5/5          Knee:                  Flexion    5/5    5/5          Extension    5/5    5/5          Ankle:

## (undated) NOTE — Clinical Note
Jen Weldon, [address] [facility]       06/13/2017        Patient: Wanda Rodarte   YOB: 1965   Date of Visit: 6/13/2017       Dear  Dr. Cony Wu MD,      Thank you for referring Wanda Rodarte to my practice.

## (undated) NOTE — MR AVS SNAPSHOT
[address]  [address] [place] [phone]               Thank you for choosing us for your health care visit with Phyllistine Burkitt, PT.   We are glad to serve you and happy to provide you with this summary o loose fitting clothing.             Feb 07, 2017  2:30 PM   [place] [facility] Visit By Therapist with OBED Estrada in [address] [place] [address] ([address] [place] [address],[address])    [address]   [phone]

## (undated) NOTE — MR AVS SNAPSHOT
[address]  [employer]  [phone]               Thank you for choosing us for your health care visit with Remigio Favre, PT.   We are glad to serve you and happy to provide you with [address] [facility] ([address] [place] [address])    [address] [address]   [phone]           Please arrive at your scheduled appointment time. Wear comfortable, loose fitting clothing.             Jul 05, 201

## (undated) NOTE — LETTER
No referring provider defined for this encounter. 03/22/19        Patient: Radha Barron   YOB: 1965   Date of Visit: 3/22/2019       Dear  Dr. Almaz Ramey MD,      Thank you for referring Radha Murders to my practice.   Please find patient. If you have any questions please feel free to call. Sincerely,   Brianne Connolly MD   [address] [address]    Document electronically generated by:   Arben Sow

## (undated) NOTE — Clinical Note
Patient Name: Shae Gomez,    : 1965,     MRN: [id number]   Date:  2017  Referring Physician:  Dr Chrissie Sheets  Diagnosis: Lumbar sprain S33. 5XXA,    Progress Summary    Pt has attended 16 visits  in Physical Therapy.      Progress Note S 5. Pt will exhibit 4/5 strength and 7 secs hold x 7 reps in PFM for continence maintenance with lifting and coughing: met, no leakage               Objective:   Lumbar motions:WFL and painfree  Minimal hypertonicty , no pain upon palpation except tenderne X___________________________________________________ Date____________________    Certification From: 6/7/0883  To:4/5/2017

## (undated) NOTE — MR AVS SNAPSHOT
[address]  [address] [place] [place] [phone]               Thank you for choosing us for your health care visit with Jasosn Trejo PT.   We are glad to serve you and happy to provide you with this summary o loose fitting clothing.             Feb 02, 2017  3:15 PM   [facility] Visit By Therapist with OBED Carlton in [address] [place] [address] ([address] [place] [address],[address])    [address]   [phone]

## (undated) NOTE — MR AVS SNAPSHOT
[address] [address]  [phone]               Thank you for choosing us for your health care visit with Guillermina Plata MD.  We are glad to serve you and happy to provide you with this [place] Comment:  Treatment: knee pain, bilateral - right more than left    Physician goals: strenghtening and analgesia    Region Specific: both knees, right more than left    Rehab potential: good    Frequency: 1 times per week    Duration: 4 -6 weeks    Number Systemic lupus erythematosus    Leukopenia, unspecified type    -  Primary    Chronic pain of both knees          Instructions and Information about Your Health    1. Prednisone  5mg a day x 2 weeks. - keep extra to see if you need it   2.  Cont. hydroxyc Where to Get Your Medications      These medications were sent to [facility], [place] - [address] [facility], [phone]  [address] [place] [address], [address] [place] [address]     Phone:  [phone] - prednisone

## (undated) NOTE — Clinical Note
Dear Dr. Ruba Cadet ref. provider found    This letter is to inform you that Tricia Costa has been attending {THERAPY LIST:5129} with me. See below for my most recent plan of care.

## (undated) NOTE — MR AVS SNAPSHOT
[address]  [address] [place] [phone]               Thank you for choosing us for your health care visit with Stef Pittman PT.   We are glad to serve you and happy to provide you with this summary o [facility] PT Visit with Emanuel Taveras, PT   [facility] in [address] [place] [address] ([address] [facility])    [facility] [phone]           Please arrive at your scheduled appointment time.   Wear comfortable, pain for improved donning socks and shoes   3. Pt to report reduced urinary leakage with coughing or laughing to no more than 1x/month for improved pelvic floor strength   4.  Pt to improve strength in all muscles tested below 5 by 1/2 MMT grade for improve

## (undated) NOTE — LETTER
November 5, 2019    Elwood Prader, MD  [address]     Patient: Kaitlyn Fofana   YOB: 1965   Date of Visit: 11/5/2019       Dear Dr. Ivetet Mckinney MD:    Thank you for referring Kaitlyn Fofana to me • No Known Problems Paternal Grandmother    • No Known Problems Paternal Grandfather    • No Known Problems Self    • No Known Problems Daughter    • No Known Problems Maternal Aunt    • No Known Problems Paternal Aunt    • No Known Problems Maternal Cousi Correction:  Glasses          Tonometry ([facility]care, 4:01 PM)       Right Left    Pressure 14 15          Pupils       Pupils    Right PERRL    Left PERRL          Visual Fields       Left Right     Full Full          Extraocular Movement       Right Left rheumatologist.        Age-related nuclear cataract of both eyes  Discussed early cataracts with patient. No treatment recommended at this time. New glasses; suggest update. Chronic dryness of both eyes  Diagnosis discussed with patient.   Use artifi

## (undated) NOTE — Clinical Note
6/9/2017              [address]        [place] [address]         Dear Rachel Loja,    It was a pleasure to see you at our [address] [place] [address] OB/GYN office.   Your Pap smear is normal.  Th

## (undated) NOTE — MR AVS SNAPSHOT
[address]  [phone]               Thank you for choosing us for your health care visit with Estelle Byrnes PT.   We are glad to serve you and happy to provide you with Please arrive at your scheduled appointment time. Wear comfortable, loose fitting clothing.             Jul 26, 2017  3:30 PM   [place] [facility] Visit By Therapist with George Hobbsr, [address] [facility] -

## (undated) NOTE — LETTER
8/10/2020              Tami Viera        [address]        [place] [address]         Dear Tami Gramajo,      It was a pleasure to see you at our [address] [place] [address] office.   Your lab tests were show >=60 121   Albumin      3.4 - 5.0 g/dL 3.6   PHOSPHORUS      2.5 - 4.9 mg/dL 3.9   Color Urine      Yellow Yellow   CLARITY URINE      Clear Clear   Spec Gravity      1.002 - 1.035 1.016   Glucose Urine      Negative mg/dL Negative   Bilirubin      Neg

## (undated) NOTE — LETTER
December 15, 2021    Anthony Welch MD  [address] [facility]     Patient: Kathrin Chapman   YOB: 1965   Date of Visit: 12/15/2021       Dear Dr. Dion Blanco MD:    Thank you for referring Kathrin Chapman to me for evaluation. Mother    • Diabetes Sister    • Diabetes Brother    • No Known Problems Son    • No Known Problems Maternal Grandmother    • No Known Problems Maternal Grandfather    • No Known Problems Paternal Grandmother    • No Known Problems Paternal Grandfather 500-200 MG-UNIT Oral Tab Take 1 tablet by mouth daily. Take 2 tabs.  Every day         Allergies:  No Known Allergies    ROS:     ROS     Positive for: Eyes    Negative for: Constitutional, Gastrointestinal, Neurological, Skin, Genitourinary, Musculoskeleta Refraction       Sphere Cylinder Dist [place] Add [place]    Right +2.00 Sphere 20/20 +2.50 20/20    Left +1.75 Sphere 20/25- +2.50 20/20          Final Rx       Sphere Cylinder Dist [place] Add [place]    Right +2.00 Sphere 20/20 +2.50 20/20    Left +1.75 Sphere 20 Emily Tirado MD    Document electronically generated by: Vivi Ely MD

## (undated) NOTE — LETTER
June 26, 2024      No Recipients     Patient: Mary Carmen Viera   YOB: 1965   Date of Visit: 6/26/2024       Dear Dr. Machado Recipients:    Thank you for referring Mary Carmen Viera to me for evaluation. Here is my assessment and plan of care:    Mary Carmen Viera is a 58 year old female.    HPI:     HPI    Here for a Plaquenil EE. Pt is taking 200mg of Plaquenil bid twice a day for Lupus. Dx in 2008. Pt has been seeing Dr. Gross every 6 - 8 months. Pt denies any blurred vision and is happy with her glasses. Pt is not using any eye drops. Pt has a lot of  dry eyes.  Last edited by Stacie Sim OT on 6/26/2024  9:15 AM.        Patient History:  Past Medical History:    Age-related nuclear cataract of both eyes    Anemia    Arthritis    Back problem    Dry eye    per ng schirmer 10mm/5mm    Early cataract    History of blood transfusion    2 units    Lipid screening    per NG    Long-term use of Plaquenil    per ng w/ no toxicity    Lupus (HCC)    per ng sistemic lupus erythematous ; prednison & plaquenil; lupus nephritis nephrotic range proteinuria    Meibomian gland dysfunction    SLE (systemic lupus erythematosus) (HCC)    Prednisone and Plaquenil       Surgical History: Mary Carmen Viera has a past surgical history that includes blood transfusions (2013) (per ng 2 unit); cataract (2013) (per ng early); hysterectomy (2013); colonoscopy (N/A, 11/16/2018) (Procedure: COLONOSCOPY;  Surgeon: Buddy Regan MD;  Location: [facility] ENDOSCOPY); and wisdom teeth removed (02/2021).    Family History   Problem Relation Age of Onset    Other (Other) Father         per ng prostatitis    No Known Problems Mother     Diabetes Sister     Diabetes Brother     No Known Problems Son     No Known Problems Maternal Grandmother     No Known Problems Maternal Grandfather     No Known Problems Paternal Grandmother     No Known Problems Paternal Grandfather     No Known Problems Self     No Known Problems Daughter     No  Known Problems Maternal Aunt     No Known Problems Paternal Aunt     No Known Problems Maternal Cousin Female     No Known Problems Maternal Cousin Male     No Known Problems Paternal Cousin Female     No Known Problems Paternal Cousin Male     No Known Problems Other     Glaucoma Neg     Macular degeneration Neg     Breast Cancer Neg     Ovarian Cancer Neg     DCIS Neg     LCIS Neg     BRCA gene + Neg     Ashkenazi Religion Descent Neg        Social History:   Social History     Socioeconomic History    Marital status:    Tobacco Use    Smoking status: Never    Smokeless tobacco: Never   Vaping Use    Vaping status: Never Used   Substance and Sexual Activity    Alcohol use: Yes     Alcohol/week: 2.0 standard drinks of alcohol     Types: 2 Cans of beer per week     Comment: occasionally    Drug use: No    Sexual activity: Never     Birth control/protection: Hysterectomy   Other Topics Concern    Caffeine Concern Yes     Comment: 3cups coffee, soda, tea    Pt has a pacemaker No    Pt has a defibrillator No    Reaction to local anesthetic No       Medications:  Current Outpatient Medications   Medication Sig Dispense Refill    hydroxychloroquine 200 MG Oral Tab Take 1 tablet (200 mg total) by mouth 2 (two) times daily. 180 tablet 3    cephalexin 500 MG Oral Cap Take 1 capsule (500 mg total) by mouth every 8 (eight) hours. 21 capsule 0    Omega-3 Fatty Acids (FISH OIL OR) Take by mouth daily.      Vitamin D3 1000 units Oral Tab Take 1 tablet (1,000 Units total) by mouth 2 (two) times daily.      Calcium Carbonate-Vitamin D 500-200 MG-UNIT Oral Tab Take 1 tablet by mouth daily. Take 2 tabs. Every day         Allergies:  No Known Allergies    ROS:     ROS    Positive for: Eyes  Last edited by Stacie Sim OT on 6/26/2024  8:58 AM.          PHYSICAL EXAM:     Base Eye Exam       Visual Acuity (Snellen - Linear)         Right Left    Dist cc 20/25 20/25 -1    Near cc 20/25 20/25      Correction: Glasses               Tonometry (Applanation, 9:13 AM)         Right Left    Pressure 14 14              Pupils         Pupils    Right PERRL    Left PERRL              Visual Fields         Left Right     Full Full              Extraocular Movement         Right Left     Full, Ortho Full, Ortho              Neuro/Psych       Oriented x3: Yes    Mood/Affect: Normal              Dilation       Both eyes: 1.0% Mydriacyl and 2.5% Alistair Synephrine @ 9:15 AM              Dilation #2       Both eyes: 1.0% Mydriacyl and 2.5% Alistair Synephrine @ 9:15 AM                  Additional Tests       Amsler         Right Left     Normal Normal              Color         Right Left    Ishihara 5/5 5/5                  Slit Lamp and Fundus Exam       Slit Lamp Exam         Right Left    Lids/Lashes Dermatochalasis, Meibomian gland dysfunction, oily lids Dermatochalasis, Meibomian gland dysfunction, oily lids    Conjunctiva/Sclera Nasal pinguecula, Ocular Melanosis, no follicles or papilla Nasal pinguecula, Ocular Melanosis, no follicles or papilla    Cornea 1+ arcus 1+ arcus, Salzmann's nodule at 9 o'clock    Anterior Chamber Deep and quiet Deep and quiet    Iris Normal Normal    Lens Trace Nuclear sclerosis Trace Nuclear sclerosis    Vitreous Clear Clear              Fundus Exam         Right Left    Disc Good rim, Temporal crescent Good rim, Temporal crescent    C/D Ratio 0.35 0.35    Macula Normal- no plaquenil toxicity Normal- no plaquenil toxicity    Vessels Normal Normal    Periphery Normal Normal                  Refraction       Wearing Rx         Sphere Cylinder Add    Right +2.00 Sphere +2.50    Left +1.75 Sphere +2.50      Age: 3yrs    Type: Progressive bifocal   Patient does not want a refraction.             Final Rx         Sphere Cylinder Add    Right +2.00 Sphere +2.50    Left +1.75 Sphere +2.50      Type: Progressive bifocal                     ASSESSMENT/PLAN:     Diagnoses and Plan:     Chronic dryness of both eyes  Patient was  instructed to use warm compresses to the eyelids twice a day everyday.    Instructions for warm compress use:   Patient should place wash compresses on both eyelids for 5 minutes every morning and every night.  After 5 minutes of holding the warm compresses on the eyelids, patient should gently rub the eyelashes and then rinse thoroughly with warm water.     Patient should also use artificial tears (any over the counter brand is okay) up to 4-6  times per day as needed for dry eye symptoms.        Age-related nuclear cataract of both eyes  Discussed mild cataracts in both eyes that are not affecting vision and are not surgical at this time.    Copy of last glasses RX given.     Long-term use of Plaquenil   No evidence of plaquenil toxicity in either eye.    Patient is approved to continue on plaquenil as directed by their PCP or rheumatologist.   Recommend yearly eye exams with an ophthalmologist due to Plaquenil use.     No orders of the defined types were placed in this encounter.      Meds This Visit:  Requested Prescriptions      No prescriptions requested or ordered in this encounter        Follow up instructions:  Return in about 1 year (around 6/26/2025) for Plaquenil eye exam.    6/26/2024  Scribed by: Abundio Peña MD        If you have questions, please do not hesitate to call me. I look forward to following Mary Carmen along with you.    Sincerely,        Abundio Peña MD        CC:   No Recipients    Document electronically generated by: Abundio Peña MD

## (undated) NOTE — LETTER
10/3/2018              Helen Viera        [address]        [place] [address]         Dear Helen Kelley,      It was a pleasure to see you at our [address] [place] [address] [place], [place] , [facility] office.   Your lab tests were normal.  There is

## (undated) NOTE — LETTER
December 17, 2020    Sona Ibanez, [address] [facility] [address] [facility],[address]     Patient: Tu Ramirez   YOB: 1965   Date of Visit: 12/17/2020       Dear Dr. Ayaka Woodall MD:    Thank you for referring Tu Ramirez to • No Known Problems Paternal Grandmother    • No Known Problems Paternal Grandfather    • No Known Problems Self    • No Known Problems Daughter    • No Known Problems Maternal Aunt    • No Known Problems Paternal Aunt    • No Known Problems Maternal Cousi • Calcium Carbonate-Vitamin D (OYSTER SHELL CALCIUM 500 + D) 500-200 MG-UNIT Oral Tab Take 1 tablet by mouth daily. Take 2 tabs.  Every day         Allergies:  No Known Allergies    ROS:       PHYSICAL EXAM:     Base Eye Exam     Visual Acuity (Snellen - Reina Villegases No evidence of plaquenil toxicity in either eye. Will follow in 1 year for a plaquenil eye exam based on current guidelines.    Patient is approved to continue on plaquenil as directed by their PCP or rheumatologist.        Chronic dryness of both eyes  P